# Patient Record
Sex: MALE | Race: WHITE | NOT HISPANIC OR LATINO | Employment: OTHER | URBAN - METROPOLITAN AREA
[De-identification: names, ages, dates, MRNs, and addresses within clinical notes are randomized per-mention and may not be internally consistent; named-entity substitution may affect disease eponyms.]

---

## 2018-01-23 ENCOUNTER — CONVERSION ENCOUNTER (OUTPATIENT)
Dept: FAMILY MEDICINE CLINIC | Facility: CLINIC | Age: 63
End: 2018-01-23

## 2018-01-23 ENCOUNTER — OFFICE VISIT CONVERTED (OUTPATIENT)
Dept: FAMILY MEDICINE CLINIC | Facility: CLINIC | Age: 63
End: 2018-01-23
Attending: FAMILY MEDICINE

## 2018-07-26 ENCOUNTER — CONVERSION ENCOUNTER (OUTPATIENT)
Dept: FAMILY MEDICINE CLINIC | Facility: CLINIC | Age: 63
End: 2018-07-26

## 2018-07-26 ENCOUNTER — OFFICE VISIT CONVERTED (OUTPATIENT)
Dept: FAMILY MEDICINE CLINIC | Facility: CLINIC | Age: 63
End: 2018-07-26
Attending: FAMILY MEDICINE

## 2018-08-16 ENCOUNTER — CONVERSION ENCOUNTER (OUTPATIENT)
Dept: FAMILY MEDICINE CLINIC | Facility: CLINIC | Age: 63
End: 2018-08-16

## 2018-09-14 ENCOUNTER — OFFICE VISIT CONVERTED (OUTPATIENT)
Dept: FAMILY MEDICINE CLINIC | Facility: CLINIC | Age: 63
End: 2018-09-14
Attending: FAMILY MEDICINE

## 2018-09-14 ENCOUNTER — CONVERSION ENCOUNTER (OUTPATIENT)
Dept: FAMILY MEDICINE CLINIC | Facility: CLINIC | Age: 63
End: 2018-09-14

## 2018-09-28 ENCOUNTER — CONVERSION ENCOUNTER (OUTPATIENT)
Dept: FAMILY MEDICINE CLINIC | Facility: CLINIC | Age: 63
End: 2018-09-28

## 2018-09-28 ENCOUNTER — OFFICE VISIT CONVERTED (OUTPATIENT)
Dept: FAMILY MEDICINE CLINIC | Facility: CLINIC | Age: 63
End: 2018-09-28
Attending: FAMILY MEDICINE

## 2019-01-24 ENCOUNTER — HOSPITAL ENCOUNTER (OUTPATIENT)
Dept: LAB | Facility: HOSPITAL | Age: 64
Discharge: HOME | End: 2019-01-24
Attending: FAMILY MEDICINE

## 2019-01-24 LAB
ALBUMIN SERPL-MCNC: 4 G/DL (ref 3.5–5)
ALBUMIN/GLOB SERPL: 1.5 {RATIO} (ref 1.4–2.6)
ALP SERPL-CCNC: 102 U/L (ref 56–155)
ALT SERPL-CCNC: 17 U/L (ref 10–40)
ANION GAP SERPL CALC-SCNC: 22 MMOL/L (ref 8–19)
AST SERPL-CCNC: 14 U/L (ref 15–50)
BASOPHILS # BLD AUTO: 0.07 10*3/UL (ref 0–0.2)
BASOPHILS NFR BLD AUTO: 0.75 % (ref 0–3)
BILIRUB SERPL-MCNC: 0.43 MG/DL (ref 0.2–1.3)
BUN SERPL-MCNC: 11 MG/DL (ref 5–25)
BUN/CREAT SERPL: 10 {RATIO} (ref 6–20)
CALCIUM SERPL-MCNC: 9.4 MG/DL (ref 8.7–10.4)
CHLORIDE SERPL-SCNC: 104 MMOL/L (ref 99–111)
CHOLEST SERPL-MCNC: 168 MG/DL (ref 107–200)
CHOLEST/HDLC SERPL: 3.8 {RATIO} (ref 3–6)
CONV CO2: 23 MMOL/L (ref 22–32)
CONV CREATININE URINE, RANDOM: 346.4 MG/DL (ref 10–300)
CONV MICROALBUM.,U,RANDOM: <12 MG/L (ref 0–20)
CONV TOTAL PROTEIN: 6.6 G/DL (ref 6.3–8.2)
CREAT UR-MCNC: 1.13 MG/DL (ref 0.7–1.2)
EOSINOPHIL # BLD AUTO: 0.12 10*3/UL (ref 0–0.7)
EOSINOPHIL # BLD AUTO: 1.28 % (ref 0–7)
ERYTHROCYTE [DISTWIDTH] IN BLOOD BY AUTOMATED COUNT: 11.5 % (ref 11.5–14.5)
GFR SERPLBLD BASED ON 1.73 SQ M-ARVRAT: >60 ML/MIN/{1.73_M2}
GLOBULIN UR ELPH-MCNC: 2.6 G/DL (ref 2–3.5)
GLUCOSE SERPL-MCNC: 86 MG/DL (ref 70–99)
HBA1C MFR BLD: 14.9 G/DL (ref 14–18)
HCT VFR BLD AUTO: 45.9 % (ref 42–52)
HDLC SERPL-MCNC: 44 MG/DL (ref 40–60)
LDLC SERPL CALC-MCNC: 95 MG/DL (ref 70–100)
LYMPHOCYTES # BLD AUTO: 2.96 10*3/UL (ref 1–5)
MCH RBC QN AUTO: 31.1 PG (ref 27–31)
MCHC RBC AUTO-ENTMCNC: 32.5 G/DL (ref 33–37)
MCV RBC AUTO: 95.6 FL (ref 80–96)
MICROALBUMIN/CREAT UR: 3.5 MG/G{CRE} (ref 0–25)
MONOCYTES # BLD AUTO: 0.63 10*3/UL (ref 0.2–1.2)
MONOCYTES NFR BLD AUTO: 6.65 % (ref 3–10)
NEUTROPHILS # BLD AUTO: 5.74 10*3/UL (ref 2–8)
NEUTROPHILS NFR BLD AUTO: 60.3 % (ref 30–85)
NRBC BLD AUTO-RTO: 0 % (ref 0–0.01)
OSMOLALITY SERPL CALC.SUM OF ELEC: 299 MOSM/KG (ref 273–304)
PLATELET # BLD AUTO: 180 10*3/UL (ref 130–400)
PMV BLD AUTO: 9.1 FL (ref 7.4–10.4)
POTASSIUM SERPL-SCNC: 3.7 MMOL/L (ref 3.5–5.3)
PSA SERPL-MCNC: 0.99 NG/ML (ref 0–4)
RBC # BLD AUTO: 4.81 10*6/UL (ref 4.7–6.1)
SODIUM SERPL-SCNC: 145 MMOL/L (ref 135–147)
TRIGL SERPL-MCNC: 147 MG/DL (ref 40–150)
TSH SERPL-ACNC: 2.3 M[IU]/L (ref 0.27–4.2)
VARIANT LYMPHS NFR BLD MANUAL: 31.1 % (ref 20–45)
VLDLC SERPL-MCNC: 29 MG/DL (ref 5–37)
WBC # BLD AUTO: 9.53 10*3/UL (ref 4.8–10.8)

## 2019-01-28 ENCOUNTER — CONVERSION ENCOUNTER (OUTPATIENT)
Dept: FAMILY MEDICINE CLINIC | Facility: CLINIC | Age: 64
End: 2019-01-28

## 2019-01-28 ENCOUNTER — OFFICE VISIT CONVERTED (OUTPATIENT)
Dept: FAMILY MEDICINE CLINIC | Facility: CLINIC | Age: 64
End: 2019-01-28
Attending: FAMILY MEDICINE

## 2019-08-08 ENCOUNTER — HOSPITAL ENCOUNTER (OUTPATIENT)
Dept: LAB | Facility: HOSPITAL | Age: 64
Discharge: HOME | End: 2019-08-08
Attending: FAMILY MEDICINE

## 2019-08-08 ENCOUNTER — CONVERSION ENCOUNTER (OUTPATIENT)
Dept: FAMILY MEDICINE CLINIC | Facility: CLINIC | Age: 64
End: 2019-08-08

## 2019-08-08 ENCOUNTER — OFFICE VISIT CONVERTED (OUTPATIENT)
Dept: FAMILY MEDICINE CLINIC | Facility: CLINIC | Age: 64
End: 2019-08-08
Attending: FAMILY MEDICINE

## 2019-08-08 LAB
BASOPHILS # BLD AUTO: 0.04 10*3/UL (ref 0–0.2)
BASOPHILS NFR BLD AUTO: 0.4 % (ref 0–3)
CONV ABS IMM GRAN: 0.04 10*3/UL (ref 0–0.2)
CONV IMMATURE GRAN: 0.4 % (ref 0–1.8)
DEPRECATED RDW RBC AUTO: 43 FL (ref 35.1–43.9)
EOSINOPHIL # BLD AUTO: 0.13 10*3/UL (ref 0–0.7)
EOSINOPHIL # BLD AUTO: 1.4 % (ref 0–7)
ERYTHROCYTE [DISTWIDTH] IN BLOOD BY AUTOMATED COUNT: 12.4 % (ref 11.6–14.4)
HCT VFR BLD AUTO: 43.9 % (ref 42–52)
HGB BLD-MCNC: 14.1 G/DL (ref 14–18)
LYMPHOCYTES # BLD AUTO: 1.89 10*3/UL (ref 1–5)
LYMPHOCYTES NFR BLD AUTO: 20.2 % (ref 20–45)
MCH RBC QN AUTO: 30.4 PG (ref 27–31)
MCHC RBC AUTO-ENTMCNC: 32.1 G/DL (ref 33–37)
MCV RBC AUTO: 94.6 FL (ref 80–96)
MONOCYTES # BLD AUTO: 0.86 10*3/UL (ref 0.2–1.2)
MONOCYTES NFR BLD AUTO: 9.2 % (ref 3–10)
NEUTROPHILS # BLD AUTO: 6.41 10*3/UL (ref 2–8)
NEUTROPHILS NFR BLD AUTO: 68.4 % (ref 30–85)
NRBC CBCN: 0 % (ref 0–0.7)
PLATELET # BLD AUTO: 172 10*3/UL (ref 130–400)
PMV BLD AUTO: 12.3 FL (ref 9.4–12.4)
RBC # BLD AUTO: 4.64 10*6/UL (ref 4.7–6.1)
WBC # BLD AUTO: 9.37 10*3/UL (ref 4.8–10.8)

## 2019-08-09 LAB
ALBUMIN SERPL-MCNC: 4.3 G/DL (ref 3.5–5)
ALBUMIN/GLOB SERPL: 1.7 {RATIO} (ref 1.4–2.6)
ALP SERPL-CCNC: 123 U/L (ref 56–155)
ALT SERPL-CCNC: 14 U/L (ref 10–40)
ANION GAP SERPL CALC-SCNC: 14 MMOL/L (ref 8–19)
AST SERPL-CCNC: 19 U/L (ref 15–50)
BILIRUB SERPL-MCNC: 0.54 MG/DL (ref 0.2–1.3)
BUN SERPL-MCNC: 11 MG/DL (ref 5–25)
BUN/CREAT SERPL: 11 {RATIO} (ref 6–20)
CALCIUM SERPL-MCNC: 9.1 MG/DL (ref 8.7–10.4)
CHLORIDE SERPL-SCNC: 103 MMOL/L (ref 99–111)
CHOLEST SERPL-MCNC: 168 MG/DL (ref 107–200)
CHOLEST/HDLC SERPL: 4.1 {RATIO} (ref 3–6)
CONV CO2: 25 MMOL/L (ref 22–32)
CONV CREATININE URINE, RANDOM: 185.9 MG/DL (ref 10–300)
CONV MICROALBUM.,U,RANDOM: <12 MG/L (ref 0–20)
CONV TOTAL PROTEIN: 6.9 G/DL (ref 6.3–8.2)
CREAT UR-MCNC: 1.01 MG/DL (ref 0.7–1.2)
GFR SERPLBLD BASED ON 1.73 SQ M-ARVRAT: >60 ML/MIN/{1.73_M2}
GLOBULIN UR ELPH-MCNC: 2.6 G/DL (ref 2–3.5)
GLUCOSE SERPL-MCNC: 78 MG/DL (ref 70–99)
HDLC SERPL-MCNC: 41 MG/DL (ref 40–60)
LDLC SERPL CALC-MCNC: 106 MG/DL (ref 70–100)
MICROALBUMIN/CREAT UR: 6.5 MG/G{CRE} (ref 0–25)
OSMOLALITY SERPL CALC.SUM OF ELEC: 284 MOSM/KG (ref 273–304)
POTASSIUM SERPL-SCNC: 4.4 MMOL/L (ref 3.5–5.3)
SODIUM SERPL-SCNC: 138 MMOL/L (ref 135–147)
TRIGL SERPL-MCNC: 104 MG/DL (ref 40–150)
TSH SERPL-ACNC: 1.57 M[IU]/L (ref 0.27–4.2)
VLDLC SERPL-MCNC: 21 MG/DL (ref 5–37)

## 2019-10-07 ENCOUNTER — CONVERSION ENCOUNTER (OUTPATIENT)
Dept: GASTROENTEROLOGY | Facility: CLINIC | Age: 64
End: 2019-10-07
Attending: INTERNAL MEDICINE

## 2019-10-29 ENCOUNTER — HOSPITAL ENCOUNTER (OUTPATIENT)
Dept: GASTROENTEROLOGY | Facility: HOSPITAL | Age: 64
Setting detail: HOSPITAL OUTPATIENT SURGERY
Discharge: HOME | End: 2019-10-29
Attending: INTERNAL MEDICINE

## 2020-02-07 ENCOUNTER — HOSPITAL ENCOUNTER (OUTPATIENT)
Dept: LAB | Facility: HOSPITAL | Age: 65
Discharge: HOME | End: 2020-02-07
Attending: FAMILY MEDICINE

## 2020-02-07 LAB
ALBUMIN SERPL-MCNC: 4.1 G/DL (ref 3.5–5)
ALBUMIN/GLOB SERPL: 1.6 {RATIO} (ref 1.4–2.6)
ALP SERPL-CCNC: 123 U/L (ref 56–155)
ALT SERPL-CCNC: 23 U/L (ref 10–40)
ANION GAP SERPL CALC-SCNC: 20 MMOL/L (ref 8–19)
AST SERPL-CCNC: 23 U/L (ref 15–50)
BASOPHILS # BLD AUTO: 0.03 10*3/UL (ref 0–0.2)
BASOPHILS NFR BLD AUTO: 0.4 % (ref 0–3)
BILIRUB SERPL-MCNC: 0.28 MG/DL (ref 0.2–1.3)
BUN SERPL-MCNC: 11 MG/DL (ref 5–25)
BUN/CREAT SERPL: 11 {RATIO} (ref 6–20)
CALCIUM SERPL-MCNC: 8.9 MG/DL (ref 8.7–10.4)
CHLORIDE SERPL-SCNC: 108 MMOL/L (ref 99–111)
CHOLEST SERPL-MCNC: 194 MG/DL (ref 107–200)
CHOLEST/HDLC SERPL: 4.9 {RATIO} (ref 3–6)
CONV ABS IMM GRAN: 0.04 10*3/UL (ref 0–0.2)
CONV CO2: 20 MMOL/L (ref 22–32)
CONV CREATININE URINE, RANDOM: 147.9 MG/DL (ref 10–300)
CONV IMMATURE GRAN: 0.5 % (ref 0–1.8)
CONV MICROALBUM.,U,RANDOM: <12 MG/L (ref 0–20)
CONV TOTAL PROTEIN: 6.6 G/DL (ref 6.3–8.2)
CREAT UR-MCNC: 1.02 MG/DL (ref 0.7–1.2)
DEPRECATED RDW RBC AUTO: 42.4 FL (ref 35.1–43.9)
EOSINOPHIL # BLD AUTO: 0.31 10*3/UL (ref 0–0.7)
EOSINOPHIL # BLD AUTO: 3.9 % (ref 0–7)
ERYTHROCYTE [DISTWIDTH] IN BLOOD BY AUTOMATED COUNT: 12.2 % (ref 11.6–14.4)
GFR SERPLBLD BASED ON 1.73 SQ M-ARVRAT: >60 ML/MIN/{1.73_M2}
GLOBULIN UR ELPH-MCNC: 2.5 G/DL (ref 2–3.5)
GLUCOSE SERPL-MCNC: 97 MG/DL (ref 70–99)
HCT VFR BLD AUTO: 46 % (ref 42–52)
HDLC SERPL-MCNC: 40 MG/DL (ref 40–60)
HGB BLD-MCNC: 14.9 G/DL (ref 14–18)
LDLC SERPL CALC-MCNC: 130 MG/DL (ref 70–100)
LYMPHOCYTES # BLD AUTO: 1.83 10*3/UL (ref 1–5)
LYMPHOCYTES NFR BLD AUTO: 23.2 % (ref 20–45)
MCH RBC QN AUTO: 30.7 PG (ref 27–31)
MCHC RBC AUTO-ENTMCNC: 32.4 G/DL (ref 33–37)
MCV RBC AUTO: 94.8 FL (ref 80–96)
MICROALBUMIN/CREAT UR: 8.1 MG/G{CRE} (ref 0–25)
MONOCYTES # BLD AUTO: 0.74 10*3/UL (ref 0.2–1.2)
MONOCYTES NFR BLD AUTO: 9.4 % (ref 3–10)
NEUTROPHILS # BLD AUTO: 4.95 10*3/UL (ref 2–8)
NEUTROPHILS NFR BLD AUTO: 62.6 % (ref 30–85)
NRBC CBCN: 0 % (ref 0–0.7)
OSMOLALITY SERPL CALC.SUM OF ELEC: 297 MOSM/KG (ref 273–304)
PLATELET # BLD AUTO: 180 10*3/UL (ref 130–400)
PMV BLD AUTO: 11.5 FL (ref 9.4–12.4)
POTASSIUM SERPL-SCNC: 3.9 MMOL/L (ref 3.5–5.3)
PSA SERPL-MCNC: 0.86 NG/ML (ref 0–4)
RBC # BLD AUTO: 4.85 10*6/UL (ref 4.7–6.1)
SODIUM SERPL-SCNC: 144 MMOL/L (ref 135–147)
TRIGL SERPL-MCNC: 122 MG/DL (ref 40–150)
TSH SERPL-ACNC: 2.68 M[IU]/L (ref 0.27–4.2)
VLDLC SERPL-MCNC: 24 MG/DL (ref 5–37)
WBC # BLD AUTO: 7.9 10*3/UL (ref 4.8–10.8)

## 2020-02-10 ENCOUNTER — OFFICE VISIT CONVERTED (OUTPATIENT)
Dept: FAMILY MEDICINE CLINIC | Facility: CLINIC | Age: 65
End: 2020-02-10
Attending: FAMILY MEDICINE

## 2020-03-26 ENCOUNTER — HOSPITAL ENCOUNTER (OUTPATIENT)
Dept: GENERAL RADIOLOGY | Facility: HOSPITAL | Age: 65
Discharge: HOME | End: 2020-03-26
Attending: FAMILY MEDICINE

## 2020-08-10 ENCOUNTER — HOSPITAL ENCOUNTER (OUTPATIENT)
Dept: LAB | Facility: HOSPITAL | Age: 65
Discharge: HOME | End: 2020-08-10
Attending: FAMILY MEDICINE

## 2020-08-10 LAB
ALBUMIN SERPL-MCNC: 4.4 G/DL (ref 3.5–5)
ALBUMIN/GLOB SERPL: 1.8 {RATIO} (ref 1.4–2.6)
ALP SERPL-CCNC: 118 U/L (ref 56–155)
ALT SERPL-CCNC: 29 U/L (ref 10–40)
ANION GAP SERPL CALC-SCNC: 19 MMOL/L (ref 8–19)
AST SERPL-CCNC: 27 U/L (ref 15–50)
BASOPHILS # BLD AUTO: 0.03 10*3/UL (ref 0–0.2)
BASOPHILS NFR BLD AUTO: 0.4 % (ref 0–3)
BILIRUB SERPL-MCNC: 0.67 MG/DL (ref 0.2–1.3)
BUN SERPL-MCNC: 10 MG/DL (ref 5–25)
BUN/CREAT SERPL: 8 {RATIO} (ref 6–20)
CALCIUM SERPL-MCNC: 9.5 MG/DL (ref 8.7–10.4)
CHLORIDE SERPL-SCNC: 105 MMOL/L (ref 99–111)
CHOLEST SERPL-MCNC: 178 MG/DL (ref 107–200)
CHOLEST/HDLC SERPL: 3.7 {RATIO} (ref 3–6)
CONV ABS IMM GRAN: 0.04 10*3/UL (ref 0–0.2)
CONV CO2: 22 MMOL/L (ref 22–32)
CONV IMMATURE GRAN: 0.5 % (ref 0–1.8)
CONV TOTAL PROTEIN: 6.9 G/DL (ref 6.3–8.2)
CREAT UR-MCNC: 1.28 MG/DL (ref 0.7–1.2)
DEPRECATED RDW RBC AUTO: 44.7 FL (ref 35.1–43.9)
EOSINOPHIL # BLD AUTO: 0.28 10*3/UL (ref 0–0.7)
EOSINOPHIL # BLD AUTO: 3.7 % (ref 0–7)
ERYTHROCYTE [DISTWIDTH] IN BLOOD BY AUTOMATED COUNT: 12.7 % (ref 11.6–14.4)
GFR SERPLBLD BASED ON 1.73 SQ M-ARVRAT: 59 ML/MIN/{1.73_M2}
GLOBULIN UR ELPH-MCNC: 2.5 G/DL (ref 2–3.5)
GLUCOSE SERPL-MCNC: 105 MG/DL (ref 70–99)
HCT VFR BLD AUTO: 47.6 % (ref 42–52)
HDLC SERPL-MCNC: 48 MG/DL (ref 40–60)
HGB BLD-MCNC: 15.5 G/DL (ref 14–18)
LDLC SERPL CALC-MCNC: 112 MG/DL (ref 70–100)
LYMPHOCYTES # BLD AUTO: 1.51 10*3/UL (ref 1–5)
LYMPHOCYTES NFR BLD AUTO: 19.8 % (ref 20–45)
MCH RBC QN AUTO: 31.3 PG (ref 27–31)
MCHC RBC AUTO-ENTMCNC: 32.6 G/DL (ref 33–37)
MCV RBC AUTO: 96.2 FL (ref 80–96)
MONOCYTES # BLD AUTO: 0.65 10*3/UL (ref 0.2–1.2)
MONOCYTES NFR BLD AUTO: 8.5 % (ref 3–10)
NEUTROPHILS # BLD AUTO: 5.13 10*3/UL (ref 2–8)
NEUTROPHILS NFR BLD AUTO: 67.1 % (ref 30–85)
NRBC CBCN: 0 % (ref 0–0.7)
OSMOLALITY SERPL CALC.SUM OF ELEC: 293 MOSM/KG (ref 273–304)
PLATELET # BLD AUTO: 173 10*3/UL (ref 130–400)
PMV BLD AUTO: 11.6 FL (ref 9.4–12.4)
POTASSIUM SERPL-SCNC: 4.2 MMOL/L (ref 3.5–5.3)
RBC # BLD AUTO: 4.95 10*6/UL (ref 4.7–6.1)
SODIUM SERPL-SCNC: 142 MMOL/L (ref 135–147)
TRIGL SERPL-MCNC: 88 MG/DL (ref 40–150)
TSH SERPL-ACNC: 1.77 M[IU]/L (ref 0.27–4.2)
VLDLC SERPL-MCNC: 18 MG/DL (ref 5–37)
WBC # BLD AUTO: 7.64 10*3/UL (ref 4.8–10.8)

## 2020-08-13 ENCOUNTER — CONVERSION ENCOUNTER (OUTPATIENT)
Dept: FAMILY MEDICINE CLINIC | Facility: CLINIC | Age: 65
End: 2020-08-13

## 2020-08-13 ENCOUNTER — OFFICE VISIT CONVERTED (OUTPATIENT)
Dept: FAMILY MEDICINE CLINIC | Facility: CLINIC | Age: 65
End: 2020-08-13
Attending: FAMILY MEDICINE

## 2020-08-13 LAB
CONV CREATININE URINE, RANDOM: 545.1 MG/DL (ref 10–300)
CONV MICROALBUM.,U,RANDOM: 27.1 MG/L (ref 0–20)
MICROALBUMIN/CREAT UR: 5 MG/G{CRE} (ref 0–25)

## 2020-10-22 ENCOUNTER — HOSPITAL ENCOUNTER (OUTPATIENT)
Dept: GENERAL RADIOLOGY | Facility: HOSPITAL | Age: 65
Discharge: HOME | End: 2020-10-22
Attending: INTERNAL MEDICINE

## 2020-10-27 ENCOUNTER — HOSPITAL ENCOUNTER (OUTPATIENT)
Dept: GENERAL RADIOLOGY | Facility: HOSPITAL | Age: 65
Discharge: HOME | End: 2020-10-27
Attending: INTERNAL MEDICINE

## 2021-02-12 ENCOUNTER — HOSPITAL ENCOUNTER (OUTPATIENT)
Dept: LAB | Facility: HOSPITAL | Age: 66
Discharge: HOME | End: 2021-02-12
Attending: FAMILY MEDICINE

## 2021-02-12 LAB
ALBUMIN SERPL-MCNC: 4.3 G/DL (ref 3.5–5)
ALBUMIN/GLOB SERPL: 1.7 {RATIO} (ref 1.4–2.6)
ALP SERPL-CCNC: 118 U/L (ref 56–155)
ALT SERPL-CCNC: 24 U/L (ref 10–40)
ANION GAP SERPL CALC-SCNC: 19 MMOL/L (ref 8–19)
AST SERPL-CCNC: 23 U/L (ref 15–50)
BASOPHILS # BLD AUTO: 0.05 10*3/UL (ref 0–0.2)
BASOPHILS NFR BLD AUTO: 0.6 % (ref 0–3)
BILIRUB SERPL-MCNC: 0.52 MG/DL (ref 0.2–1.3)
BUN SERPL-MCNC: 13 MG/DL (ref 5–25)
BUN/CREAT SERPL: 10 {RATIO} (ref 6–20)
CALCIUM SERPL-MCNC: 9 MG/DL (ref 8.7–10.4)
CHLORIDE SERPL-SCNC: 104 MMOL/L (ref 99–111)
CHOLEST SERPL-MCNC: 184 MG/DL (ref 107–200)
CHOLEST/HDLC SERPL: 4.6 {RATIO} (ref 3–6)
CONV ABS IMM GRAN: 0.04 10*3/UL (ref 0–0.2)
CONV CO2: 24 MMOL/L (ref 22–32)
CONV IMMATURE GRAN: 0.5 % (ref 0–1.8)
CONV TOTAL PROTEIN: 6.9 G/DL (ref 6.3–8.2)
CREAT UR-MCNC: 1.31 MG/DL (ref 0.7–1.2)
DEPRECATED RDW RBC AUTO: 42.2 FL (ref 35.1–43.9)
EOSINOPHIL # BLD AUTO: 0.25 10*3/UL (ref 0–0.7)
EOSINOPHIL # BLD AUTO: 2.9 % (ref 0–7)
ERYTHROCYTE [DISTWIDTH] IN BLOOD BY AUTOMATED COUNT: 12.2 % (ref 11.6–14.4)
EST. AVERAGE GLUCOSE BLD GHB EST-MCNC: 117 MG/DL
GFR SERPLBLD BASED ON 1.73 SQ M-ARVRAT: 57 ML/MIN/{1.73_M2}
GLOBULIN UR ELPH-MCNC: 2.6 G/DL (ref 2–3.5)
GLUCOSE SERPL-MCNC: 93 MG/DL (ref 70–99)
HBA1C MFR BLD: 5.7 % (ref 3.5–5.7)
HCT VFR BLD AUTO: 47.2 % (ref 42–52)
HDLC SERPL-MCNC: 40 MG/DL (ref 40–60)
HGB BLD-MCNC: 15.4 G/DL (ref 14–18)
LDLC SERPL CALC-MCNC: 112 MG/DL (ref 70–100)
LYMPHOCYTES # BLD AUTO: 1.84 10*3/UL (ref 1–5)
LYMPHOCYTES NFR BLD AUTO: 21.3 % (ref 20–45)
MCH RBC QN AUTO: 30.6 PG (ref 27–31)
MCHC RBC AUTO-ENTMCNC: 32.6 G/DL (ref 33–37)
MCV RBC AUTO: 93.7 FL (ref 80–96)
MONOCYTES # BLD AUTO: 0.8 10*3/UL (ref 0.2–1.2)
MONOCYTES NFR BLD AUTO: 9.3 % (ref 3–10)
NEUTROPHILS # BLD AUTO: 5.65 10*3/UL (ref 2–8)
NEUTROPHILS NFR BLD AUTO: 65.4 % (ref 30–85)
NRBC CBCN: 0 % (ref 0–0.7)
OSMOLALITY SERPL CALC.SUM OF ELEC: 296 MOSM/KG (ref 273–304)
PLATELET # BLD AUTO: 210 10*3/UL (ref 130–400)
PMV BLD AUTO: 11.3 FL (ref 9.4–12.4)
POTASSIUM SERPL-SCNC: 4 MMOL/L (ref 3.5–5.3)
PSA SERPL-MCNC: 0.97 NG/ML (ref 0–4)
RBC # BLD AUTO: 5.04 10*6/UL (ref 4.7–6.1)
SODIUM SERPL-SCNC: 143 MMOL/L (ref 135–147)
TRIGL SERPL-MCNC: 160 MG/DL (ref 40–150)
TSH SERPL-ACNC: 2.98 M[IU]/L (ref 0.27–4.2)
VLDLC SERPL-MCNC: 32 MG/DL (ref 5–37)
WBC # BLD AUTO: 8.63 10*3/UL (ref 4.8–10.8)

## 2021-02-15 ENCOUNTER — HOSPITAL ENCOUNTER (OUTPATIENT)
Dept: FAMILY MEDICINE CLINIC | Facility: CLINIC | Age: 66
Discharge: HOME | End: 2021-02-15
Attending: FAMILY MEDICINE

## 2021-02-15 ENCOUNTER — HOSPITAL ENCOUNTER (OUTPATIENT)
Dept: LAB | Facility: HOSPITAL | Age: 66
Discharge: HOME | End: 2021-02-15
Attending: FAMILY MEDICINE

## 2021-02-15 ENCOUNTER — OFFICE VISIT CONVERTED (OUTPATIENT)
Dept: FAMILY MEDICINE CLINIC | Facility: CLINIC | Age: 66
End: 2021-02-15
Attending: FAMILY MEDICINE

## 2021-02-16 LAB
CONV HIV-1/ HIV-2: NONREACTIVE
HSV I/II IGM: <0.91 RATIO (ref 0–0.9)
HSV1 IGG SER IA-ACNC: >62.2 INDEX (ref 0–0.9)
HSV2 IGG SER IA-ACNC: <0.91 INDEX (ref 0–0.9)
RPR SER QL: ABNORMAL

## 2021-02-17 LAB
C TRACH RRNA CVX QL NAA+PROBE: NEGATIVE
N GONORRHOEA DNA SPEC QL NAA+PROBE: NEGATIVE

## 2021-05-13 NOTE — PROGRESS NOTES
Progress Note      Patient Name: Jonathan Guerra Jr.   Patient ID: [id number]   Sex: Male   YOB: 1955    Primary Care Provider: Jin Green MD   Referring Provider: Jin Green MD    Visit Date: August 13, 2020    Provider: Jin Green MD   Location: [facility]   Location Address: [address] [place] [address], [address] [place] [address]  [id number]   Location Phone: [phone]          Chief Complaint  · CPE     Patient is here for a CPE today.  He does not need any refills today.    No other complaints.       History Of Present Illness  Jonathan Guerra Jr. is a 64 year old /White male who presents for evaluation and treatment of:      CPX       Past Medical History  Disease Name Date Onset Notes   Anemia, Unspecified --  --    Anxiety --  --    Arthritis --  --    Asthma --  --    Broken Bones --  --    Chronic bronchitis --  --    Chronic Obstructive Pulmonary Disease --  --    Closed nondisplaced fracture of left calcaneus with routine healing, unspecified portion of calcaneus, subsequent encounter 08/29/2017 --    Depression --  --    Diverticulitis --  20 yrs ago per patient.   Emphysema --  --    Head injury --  four skull fracture. 2005. Dr. Sosa was neurosurgeon at the time.   Hernia --  --    High blood pressure --  --    Hyperlipemia --  --    Hypertension --  --    Lung disease --  --    Reflux --  --    Reflux Disease --  --    Ringing in ears --  --    Seasonal allergies --  --    Shortness of Breath --  --    Sinus Trouble 2005 sinus crushed d/t injury          Past Surgical History  Procedure Name Date Notes   Colonoscopy 2014 2019 around 1988, Keith   EGD 2013 --          Medication List  Name Date Started Instructions   cetirizine 10 mg oral tablet 06/04/2020 Take 1 tablet (10 mg) by oral route once a day for 30 days   Combivent Respimat  mcg/actuation inhalation mist 08/08/2019 inhale 1 puff by inhalation route 4 times per day ; may take additional  puffs as needed not to exceed 6 puffs in 24hrs for 30 days   Fish Oil 1,000 mg (120 mg-180 mg) oral capsule  take 1 capsule by oral route daily   hydrochlorothiazide 12.5 mg oral tablet 08/16/2019 take 1 tablet (12.5 mg) by oral route once daily for 30 days   lisinopril 20 mg oral tablet 02/10/2020 take 1 tablet by oral route 2 times a day for 30 days   meloxicam 7.5 mg oral tablet 09/19/2019 take 1 tablet by oral route 2 times a day for 30 days   metoprolol succinate 25 mg oral tablet extended release 24 hr 02/10/2020 take 2 tablets (50 mg) by oral route QAM, and 1 tablet po QHS   Protonix 40 mg oral tablet,delayed release (DR/EC) 11/14/2019 take 1 tablet (40 mg) by oral route once daily for 90 days         Allergy List  Allergen Name Date Reaction Notes   Codeine Phosphate --  --  --    Codeine Sulfate --  --  --    PENICILLINS --  --  --        Allergies Reconciled  Family Medical History  Disease Name Relative/Age Notes   Thyroid Gland Neoplasm, Malignant Mother/   --    Heart Disease Mother/   Mother  Mother  hx of MI. age 47.   Diabetes, unspecified type Sister/   Sister  Sister  grandparents   Colon Cancer Grandmother (paternal)/86  Mother/72   mom current age 75 grandmother passed at age 87         Social History  Finding Status Start/Stop Quantity Notes   Active but no formal exercise --  --/-- --  --    Alcohol Current - status unknown --/-- --  08/22/2017 - socially   Alcohol Use Current some day --/-- --  occasionally drinks, less than 1 drink per day, has been drinking for 21-30 years  occasionally drinks, 2 drinks per day, has been drinking for 21-30 years   Disabled --  --/-- --  COPD and asthma   lives alone --  --/-- --  --     --  --/-- --  lives with wife   Recreational Drug Use Never --/-- --  no   Sedentary --  --/-- --  --    Single. --  --/-- --  --    Tobacco Former --/-- 1.5 PPD 08/22/2017 - former smoker, 1.5 packs per day, smoked 6-10 years  former smoker, 1.5 packs per day,  "smoked 6-10 years  stop in 2007. Smoked for 40 yrs.         Immunizations  NameDate Admin Mfg Trade Name Lot Number Route Inj VIS Given VIS Publication   InfluenzaRefused 02/10/2020 NE Not Entered  NE NE     Comments:    Cqbzyyyzz46/28/2018 SKB Fluarix, quadrivalent, preservative free [id number] IM LD 09/28/2018 08/07/2015   Comments:    Npeqimcwk99/02/2017 SKB Fluarix, quadrivalent, preservative free [id number] IM LD 11/02/2017 07/02/2012   Comments:    Dlfwzyjqw83/22/2012 SKB Fluarix-PF > 18 Years [id number] IM RD 10/22/2012    Comments:    Tdap08/08/2019 SKB BOOSTRIX [id number] IM LA 08/08/2019    Comments: Pt. left office in stable condition   Tdap08/08/2019 SKB BOOSTRIX [id number] IM LA 08/08/2019    Comments: Pt. left office in stable condition   Tdap08/08/2019 SKB BOOSTRIX [id number] IM LA 08/08/2019    Comments: Pt. left office in stable condition         Review of Systems  · Constitutional  o Denies  o : fever, weight loss, weight gain  · Cardiovascular  o Denies  o : lower extremity edema, claudication, chest pressure, palpitations  · Respiratory  o Denies  o : shortness of breath, wheezing, cough, hemoptysis, dyspnea on exertion  · Gastrointestinal  o Denies  o : nausea, vomiting, diarrhea, constipation, abdominal pain      Vitals  Date Time BP Position Site L\R Cuff Size HR RR TEMP (F) WT  HT  BMI kg/m2 BSA m2 O2 Sat        08/13/2020 08:50 /74 Sitting    79 - R   167lbs 8oz 5'  2\" 30.64 1.82 98 %          Physical Examination  · Constitutional  o Appearance  o : alert, in no acute distress, well developed, well-nourished  · Head and Face  o Head  o : normocephalic, atraumatic, non tender, no palpable masses or nodules.  o Face  o : no facial lesions  · Eyes  o Vision  o : Acuity: grossly normal at distance, Conjuntivae: Normal, Sclerae white  · Respiratory  o Auscultation of Lungs  o : normal breath sounds throughout  · Cardiovascular  o Heart  o : Regular rate and rhythm, Normal S1,S2 "   · Gastrointestinal  o Abdominal Examination  o : abdomen soft, nontender, non distended, no rigidity, gaurding, rebound tenderness, no ventral or inguinal hernias present  o Liver and spleen  o : no hepatomegaly present, liver nontender to palpation, spleen not palpable  · Psychiatric  o Mood and Affect  o : normal mood and affect          Assessment  · Elevated glucose     790.29/R73.09  · Screening for cardiovascular condition     V81.2/Z13.6  · Routine lab draw     V72.60/Z01.89  · HLD (hyperlipidemia)     272.4/E78.5  · HTN (hypertension)     401.9/I10  · Screening for prostate cancer     V76.44/Z12.5  · Skin exam, screening for cancer     V76.43/Z12.83  · Sun exposure, moderate     E926.2/X32.XXXA  · Annual physical exam     V70.0/Z00.00       f.u in 6 months  refer to derm for yearly exam.       Plan  · Orders  o Hgb A1c [facility] ([id number]) - 790.29/R73.09, V72.60/Z01.89 - 02/13/2021  o Male Physical Primary Care Panel (CMP, CBC, TSH, Lipid, PSA) [facility] (61862, 56407, 64590, 27211, 42653, ) - V81.2/Z13.6, 272.4/E78.5, 401.9/I10, V76.44/Z12.5 - 02/13/2021  o DERMATOLOGY CONSULTATION (DERMA) - V76.43/Z12.83, E926.2/X32.XXXA - 08/13/2020  o ACO-14: Influenza immunization was not administered for reasons documented () - - 08/13/2020  o ACO-39: Current medications updated and reviewed () - - 08/13/2020  · Medications  o Medications have been Reconciled  o Transition of Care or Provider Policy  · Instructions  o Electronically Identified Patient Education Materials Provided Electronically  · Disposition  o Call or Return if symptoms worsen or persist.  o Care Transition            Electronically Signed by: Jin Green MD -Author on August 13, 2020 12:32:59 PM

## 2021-05-14 VITALS
HEIGHT: 62 IN | BODY MASS INDEX: 31.87 KG/M2 | OXYGEN SATURATION: 96 % | TEMPERATURE: 98 F | HEART RATE: 68 BPM | WEIGHT: 173.19 LBS | SYSTOLIC BLOOD PRESSURE: 142 MMHG | DIASTOLIC BLOOD PRESSURE: 82 MMHG | RESPIRATION RATE: 18 BRPM

## 2021-05-14 NOTE — PROGRESS NOTES
Progress Note      Patient Name: Jonathan Guerra Jr.   Patient ID: [id number]   Sex: Male   YOB: 1955    Primary Care Provider: Jin Green MD   Referring Provider: Jin Green MD    Visit Date: February 15, 2021    Provider: Jin Green MD   Location: [facility]   Location Address: [address] [place] [address], [address]  [place], [place]  [id number]   Location Phone: [phone]          Chief Complaint     6 month follow up       History Of Present Illness  Jonathan Guerra Jr. is a 65 year old /White male who presents for evaluation and treatment of:      Pt  presents for follow-up    Hx of anemia.  corrected.      Hx of COPD. He sees Pulm on regular basis, Dr. Warner. controlled.      Hx of HTN. pt is on metoprolol and lisinopril, and hctz.. mildly uncontrolled.     Hx of heart palpitations.   Controlled with metoprolol.    Hx of allergic rhinitis. controlled.     Hx of arhthritis pains in the joints. controlled on meloxicam.    Hx of headaches.  Controlled.     Hx of GERD. Controlled.    The cholesterol is in good range overall  He gained 6 lbs last year.     He was told he may have genital herpes..by a specialist, and he is wanting to know what to do. He did have an affair earlier last year. Denies lesions/sores on the genital area..Just an itchy rash of both groin regions.    No chest pain, soa, nausea, vomiting, diarrhea, constipation, fever, or chills. No abodminal pain.       Past Medical History  Disease Name Date Onset Notes   Anemia, Unspecified --  --    Anxiety --  --    Arthritis --  --    Asthma --  --    Broken Bones --  --    Chronic bronchitis --  --    Chronic Obstructive Pulmonary Disease --  --    Closed nondisplaced fracture of left calcaneus with routine healing, unspecified portion of calcaneus, subsequent encounter 08/29/2017 --    Depression --  --    Diverticulitis --  20 yrs ago per patient.   Emphysema --  --    Head injury --  four  skull fracture. 2005. Dr. Sosa was neurosurgeon at the time.   Hernia --  --    High blood pressure --  --    Hyperlipemia --  --    Hypertension --  --    Lung disease --  --    Reflux --  --    Reflux Disease --  --    Ringing in ears --  --    Seasonal allergies --  --    Shortness of Breath --  --    Sinus Trouble 2005 sinus crushed d/t injury          Past Surgical History  Procedure Name Date Notes   Colonoscopy 2014 2019 around 1988, [facility]   EGD 2013 --          Medication List  Name Date Started Instructions   cetirizine 10 mg oral tablet 02/15/2021 Take 1 tablet (10 mg) by oral route once a day for 30 days   clotrimazole-betamethasone 1-0.05 % topical cream 02/15/2021 apply to the affected and surrounding areas of skin by topical route 2 times per day in the morning and evening for 4 weeks   Combivent Respimat  mcg/actuation inhalation mist 02/15/2021 inhale 1 puff by inhalation route 4 times per day ; may take additional puffs as needed not to exceed 6 puffs in 24hrs for 30 days   Fish Oil 1,000 mg (120 mg-180 mg) oral capsule  take 1 capsule by oral route daily   fluconazole 100 mg oral tablet 02/15/2021 take 1 tablet (100 mg) by oral route once daily for 10 days   hydrochlorothiazide 12.5 mg oral tablet 09/08/2020 take 1 tablet (12.5 mg) by oral route once daily for 30 days   lisinopril 20 mg oral tablet 02/15/2021 take 1 tablet by oral route 2 times a day for 30 days   meloxicam 7.5 mg oral tablet 09/19/2019 take 1 tablet by oral route 2 times a day for 30 days   metoprolol succinate 25 mg oral tablet extended release 24 hr 02/10/2020 take 2 tablets (50 mg) by oral route QAM, and 1 tablet po QHS   nystatin 100,000 unit/gram topical powder 02/15/2021 apply to the affected area(s) by topical route 2 times per day for 30 days   Protonix 40 mg oral tablet,delayed release (/EC) 11/14/2019 take 1 tablet (40 mg) by oral route once daily for 90 days         Allergy List  Allergen Name Date Reaction  Notes   Codeine Phosphate --  --  --    Codeine Sulfate --  --  --    PENICILLINS --  --  --        Allergies Reconciled  Family Medical History  Disease Name Relative/Age Notes   Thyroid Gland Neoplasm, Malignant Mother/   --    Heart Disease Mother/   Mother  Mother  hx of MI. age 47.   Diabetes, unspecified type Sister/   Sister  Sister  grandparents   Colon Cancer Grandmother (paternal)/86  Mother/72   mom current age 75 grandmother passed at age 87         Social History  Finding Status Start/Stop Quantity Notes   Active but no formal exercise --  --/-- --  --    Alcohol Current - status unknown --/-- --  08/22/2017 - socially   Alcohol Use Current some day --/-- --  occasionally drinks, less than 1 drink per day, has been drinking for 21-30 years  occasionally drinks, 2 drinks per day, has been drinking for 21-30 years   Disabled --  --/-- --  COPD and asthma   lives alone --  --/-- --  --     --  --/-- --  lives with wife   Recreational Drug Use Never --/-- --  no   Sedentary --  --/-- --  --    Single. --  --/-- --  --    Tobacco Former --/-- 1.5 PPD 08/22/2017 - former smoker, 1.5 packs per day, smoked 6-10 years  former smoker, 1.5 packs per day, smoked 6-10 years  stop in 2007. Smoked for 40 yrs.         Immunizations  NameDate Admin Mfg Trade Name Lot Number Route Inj VIS Given VIS Publication   Vpcjmxhfg34/01/2020 SKB Fluarix, quadrivalent, preservative free [id number] NE NE 02/15/2021    Comments: [place] office   Tdap08/08/2019 SKB BOOSTRIX [id number] IM LA 08/08/2019    Comments: Pt. left office in stable condition         Review of Systems  · Constitutional  o Denies  o : fever, weight loss, weight gain  · Cardiovascular  o Denies  o : pedal edema, claudication, chest pressure, palpitations  · Respiratory  o Denies  o : shortness of breath, wheezing, cough, hemoptysis, dyspnea on exertion  · Gastrointestinal  o Denies  o : nausea, vomiting, diarrhea, constipation, abdominal  "pain      Vitals  Date Time BP Position Site L\R Cuff Size HR RR TEMP (F) WT  HT  BMI kg/m2 BSA m2 O2 Sat FR L/min FiO2 HC       02/15/2021 07:48 /82 Sitting    68 - R 18 98 173lbs 3oz 5'  2\" 31.68 1.85 96 %  21%          Physical Examination  · Constitutional  o Appearance  o : alert, in no acute distress, well developed, well-nourished  · Head and Face  o Head  o : normocephalic, atraumatic, non tender, no palpable masses or nodules.  o Face  o : no facial lesions  · Eyes  o Vision  o : Acuity: grossly normal at distance, Conjuntivae: Normal, Sclerae white  · Respiratory  o Auscultation of Lungs  o : normal breath sounds throughout  · Cardiovascular  o Heart  o : Regular rate and rhythm, Normal S1,S2   · Skin and Subcutaneous Tissue  o General Inspection  o : mildly erythematous rash of the bilateral groin region  · Psychiatric  o Mood and Affect  o : normal mood and affect          Assessment  · Candidiasis     112.9/B37.9  · Elevated glucose     790.29/R73.09  · Groin rash     782.1/R21  · STD exposure     V01.6/Z20.2  · Screening for cardiovascular condition     V81.2/Z13.6  · HLD (hyperlipidemia)     272.4/E78.5  · HTN (hypertension)     401.9/I10       diflucan  clotrimazole betamethasone cream    go up on metoprolol from 2 in am and 1 in pm...to 2 in am and 2 in pm.     f/u as directed.       Plan  · Orders  o STD Panel (HSV 1 and 2 IgG/IgM, HIV, RPR, GC/Chlamydia) [facility] (75661, 33051, 08653, 95273, 94254, CTNGX, ) - V01.6/Z20.2 - 02/15/2021  o Wound Culture with Sensitivities if indicated [facility] ([id number]) - 782.1/R21 - 02/15/2021  o Physical, Primary Care Panel (CBC, CMP, Lipid, TSH) [facility] ([id number], 12206, 91796, 63015) - 790.29/R73.09, V81.2/Z13.6, 272.4/E78.5, 401.9/I10 - 08/15/2021  o Hgb A1c [facility] ([id number]) - 790.29/R73.09 - 08/15/2021  o ACO-14: Influenza immunization administered or previously received () - - 02/18/2021  o ACO-39: Current medications updated and reviewed (, 9712F) - - " 02/18/2021  · Medications  o Medications have been Reconciled  o Transition of Care or Provider Policy  · Instructions  o Electronically Identified Patient Education Materials Provided Electronically  · Disposition  o Call or Return if symptoms worsen or persist.  o Care Transition            Electronically Signed by: Jin Green MD -Author on February 18, 2021 05:23:15 PM

## 2021-05-15 VITALS
SYSTOLIC BLOOD PRESSURE: 134 MMHG | BODY MASS INDEX: 30.18 KG/M2 | WEIGHT: 164 LBS | HEART RATE: 77 BPM | DIASTOLIC BLOOD PRESSURE: 78 MMHG | TEMPERATURE: 98 F | OXYGEN SATURATION: 97 % | RESPIRATION RATE: 18 BRPM | HEIGHT: 62 IN

## 2021-05-15 VITALS
HEIGHT: 62 IN | TEMPERATURE: 97.8 F | DIASTOLIC BLOOD PRESSURE: 80 MMHG | HEART RATE: 65 BPM | SYSTOLIC BLOOD PRESSURE: 142 MMHG | OXYGEN SATURATION: 97 % | WEIGHT: 164.56 LBS | BODY MASS INDEX: 30.28 KG/M2 | DIASTOLIC BLOOD PRESSURE: 78 MMHG | SYSTOLIC BLOOD PRESSURE: 152 MMHG

## 2021-05-15 VITALS
DIASTOLIC BLOOD PRESSURE: 74 MMHG | WEIGHT: 167.5 LBS | SYSTOLIC BLOOD PRESSURE: 136 MMHG | HEART RATE: 79 BPM | HEIGHT: 62 IN | BODY MASS INDEX: 30.82 KG/M2 | OXYGEN SATURATION: 98 %

## 2021-05-16 VITALS
DIASTOLIC BLOOD PRESSURE: 76 MMHG | BODY MASS INDEX: 29.48 KG/M2 | TEMPERATURE: 97.9 F | WEIGHT: 160.19 LBS | HEART RATE: 74 BPM | HEIGHT: 62 IN | SYSTOLIC BLOOD PRESSURE: 179 MMHG | OXYGEN SATURATION: 97 %

## 2021-05-16 VITALS — DIASTOLIC BLOOD PRESSURE: 80 MMHG | SYSTOLIC BLOOD PRESSURE: 144 MMHG

## 2021-05-16 VITALS
WEIGHT: 160.56 LBS | HEIGHT: 62 IN | DIASTOLIC BLOOD PRESSURE: 82 MMHG | HEART RATE: 71 BPM | TEMPERATURE: 98.3 F | BODY MASS INDEX: 29.55 KG/M2 | SYSTOLIC BLOOD PRESSURE: 160 MMHG | OXYGEN SATURATION: 96 %

## 2021-05-16 VITALS
SYSTOLIC BLOOD PRESSURE: 148 MMHG | DIASTOLIC BLOOD PRESSURE: 71 MMHG | OXYGEN SATURATION: 97 % | BODY MASS INDEX: 29.63 KG/M2 | TEMPERATURE: 98.1 F | HEIGHT: 62 IN | HEART RATE: 66 BPM | WEIGHT: 161 LBS

## 2021-05-16 VITALS
BODY MASS INDEX: 30.01 KG/M2 | DIASTOLIC BLOOD PRESSURE: 84 MMHG | SYSTOLIC BLOOD PRESSURE: 160 MMHG | OXYGEN SATURATION: 97 % | TEMPERATURE: 98.2 F | HEIGHT: 62 IN | HEART RATE: 74 BPM | WEIGHT: 163.06 LBS

## 2021-05-16 VITALS
BODY MASS INDEX: 29.89 KG/M2 | DIASTOLIC BLOOD PRESSURE: 83 MMHG | TEMPERATURE: 98.2 F | WEIGHT: 162.44 LBS | HEIGHT: 62 IN | HEART RATE: 71 BPM | SYSTOLIC BLOOD PRESSURE: 154 MMHG | OXYGEN SATURATION: 98 %

## 2021-05-16 VITALS — HEART RATE: 90 BPM | DIASTOLIC BLOOD PRESSURE: 85 MMHG | SYSTOLIC BLOOD PRESSURE: 160 MMHG

## 2021-07-22 ENCOUNTER — HOSPITAL ENCOUNTER (OUTPATIENT)
Dept: GENERAL RADIOLOGY | Facility: HOSPITAL | Age: 66
Discharge: HOME | End: 2021-07-22
Admitting: INTERNAL MEDICINE

## 2021-07-22 ENCOUNTER — TRANSCRIBE ORDERS (OUTPATIENT)
Dept: ADMINISTRATIVE | Facility: HOSPITAL | Age: 66
End: 2021-07-22

## 2021-07-22 DIAGNOSIS — R91.8 LUNG NODULES: ICD-10-CM

## 2021-07-22 DIAGNOSIS — R91.8 LUNG NODULES: Primary | ICD-10-CM

## 2021-07-22 PROCEDURE — 71046 X-RAY EXAM CHEST 2 VIEWS: CPT

## 2021-08-03 RX ORDER — CETIRIZINE HYDROCHLORIDE 10 MG/1
TABLET ORAL
Qty: 30 TABLET | Refills: 6 | Status: SHIPPED | OUTPATIENT
Start: 2021-08-03 | End: 2021-08-17 | Stop reason: SDUPTHER

## 2021-08-12 ENCOUNTER — TRANSCRIBE ORDERS (OUTPATIENT)
Dept: FAMILY MEDICINE CLINIC | Facility: CLINIC | Age: 66
End: 2021-08-12

## 2021-08-12 ENCOUNTER — LAB (OUTPATIENT)
Dept: LAB | Facility: HOSPITAL | Age: 66
End: 2021-08-12

## 2021-08-12 DIAGNOSIS — I10 ESSENTIAL HYPERTENSION, MALIGNANT: ICD-10-CM

## 2021-08-12 DIAGNOSIS — E78.5 HYPERLIPIDEMIA, UNSPECIFIED HYPERLIPIDEMIA TYPE: ICD-10-CM

## 2021-08-12 DIAGNOSIS — R73.09 IMPAIRED GLUCOSE TOLERANCE TEST: ICD-10-CM

## 2021-08-12 DIAGNOSIS — Z13.6 SCREENING FOR ISCHEMIC HEART DISEASE: ICD-10-CM

## 2021-08-12 DIAGNOSIS — R73.09 IMPAIRED GLUCOSE TOLERANCE TEST: Primary | ICD-10-CM

## 2021-08-12 LAB
BASOPHILS # BLD AUTO: 0.05 10*3/MM3 (ref 0–0.2)
BASOPHILS NFR BLD AUTO: 0.6 % (ref 0–1.5)
DEPRECATED RDW RBC AUTO: 39.4 FL (ref 37–54)
EOSINOPHIL # BLD AUTO: 0.35 10*3/MM3 (ref 0–0.4)
EOSINOPHIL NFR BLD AUTO: 4.1 % (ref 0.3–6.2)
ERYTHROCYTE [DISTWIDTH] IN BLOOD BY AUTOMATED COUNT: 11.9 % (ref 12.3–15.4)
HBA1C MFR BLD: 5.6 % (ref 4.8–5.6)
HCT VFR BLD AUTO: 43.6 % (ref 37.5–51)
HGB BLD-MCNC: 14.9 G/DL (ref 13–17.7)
IMM GRANULOCYTES # BLD AUTO: 0.03 10*3/MM3 (ref 0–0.05)
IMM GRANULOCYTES NFR BLD AUTO: 0.4 % (ref 0–0.5)
LYMPHOCYTES # BLD AUTO: 1.96 10*3/MM3 (ref 0.7–3.1)
LYMPHOCYTES NFR BLD AUTO: 23 % (ref 19.6–45.3)
MCH RBC QN AUTO: 31.1 PG (ref 26.6–33)
MCHC RBC AUTO-ENTMCNC: 34.2 G/DL (ref 31.5–35.7)
MCV RBC AUTO: 91 FL (ref 79–97)
MONOCYTES # BLD AUTO: 0.59 10*3/MM3 (ref 0.1–0.9)
MONOCYTES NFR BLD AUTO: 6.9 % (ref 5–12)
NEUTROPHILS NFR BLD AUTO: 5.54 10*3/MM3 (ref 1.7–7)
NEUTROPHILS NFR BLD AUTO: 65 % (ref 42.7–76)
NRBC BLD AUTO-RTO: 0 /100 WBC (ref 0–0.2)
PLATELET # BLD AUTO: 153 10*3/MM3 (ref 140–450)
PMV BLD AUTO: 11.4 FL (ref 6–12)
RBC # BLD AUTO: 4.79 10*6/MM3 (ref 4.14–5.8)
WBC # BLD AUTO: 8.52 10*3/MM3 (ref 3.4–10.8)

## 2021-08-12 PROCEDURE — 84443 ASSAY THYROID STIM HORMONE: CPT

## 2021-08-12 PROCEDURE — 80053 COMPREHEN METABOLIC PANEL: CPT

## 2021-08-12 PROCEDURE — 36415 COLL VENOUS BLD VENIPUNCTURE: CPT

## 2021-08-12 PROCEDURE — 80061 LIPID PANEL: CPT

## 2021-08-12 PROCEDURE — 83036 HEMOGLOBIN GLYCOSYLATED A1C: CPT

## 2021-08-12 PROCEDURE — 85025 COMPLETE CBC W/AUTO DIFF WBC: CPT

## 2021-08-13 LAB
ALBUMIN SERPL-MCNC: 4.2 G/DL (ref 3.5–5.2)
ALBUMIN/GLOB SERPL: 1.4 G/DL
ALP SERPL-CCNC: 124 U/L (ref 39–117)
ALT SERPL W P-5'-P-CCNC: 22 U/L (ref 1–41)
ANION GAP SERPL CALCULATED.3IONS-SCNC: 9.3 MMOL/L (ref 5–15)
AST SERPL-CCNC: 20 U/L (ref 1–40)
BILIRUB SERPL-MCNC: 0.3 MG/DL (ref 0–1.2)
BUN SERPL-MCNC: 13 MG/DL (ref 8–23)
BUN/CREAT SERPL: 10.4 (ref 7–25)
CALCIUM SPEC-SCNC: 8.8 MG/DL (ref 8.6–10.5)
CHLORIDE SERPL-SCNC: 106 MMOL/L (ref 98–107)
CHOLEST SERPL-MCNC: 195 MG/DL (ref 0–200)
CO2 SERPL-SCNC: 25.7 MMOL/L (ref 22–29)
CREAT SERPL-MCNC: 1.25 MG/DL (ref 0.76–1.27)
GFR SERPL CREATININE-BSD FRML MDRD: 58 ML/MIN/1.73
GLOBULIN UR ELPH-MCNC: 2.9 GM/DL
GLUCOSE SERPL-MCNC: 91 MG/DL (ref 65–99)
HDLC SERPL-MCNC: 39 MG/DL (ref 40–60)
LDLC SERPL CALC-MCNC: 134 MG/DL (ref 0–100)
LDLC/HDLC SERPL: 3.36 {RATIO}
POTASSIUM SERPL-SCNC: 4.2 MMOL/L (ref 3.5–5.2)
PROT SERPL-MCNC: 7.1 G/DL (ref 6–8.5)
SODIUM SERPL-SCNC: 141 MMOL/L (ref 136–145)
TRIGL SERPL-MCNC: 124 MG/DL (ref 0–150)
TSH SERPL DL<=0.05 MIU/L-ACNC: 3.21 UIU/ML (ref 0.27–4.2)
VLDLC SERPL-MCNC: 22 MG/DL (ref 5–40)

## 2021-08-17 ENCOUNTER — OFFICE VISIT (OUTPATIENT)
Dept: FAMILY MEDICINE CLINIC | Facility: CLINIC | Age: 66
End: 2021-08-17

## 2021-08-17 VITALS
WEIGHT: 174.2 LBS | TEMPERATURE: 98.5 F | HEART RATE: 79 BPM | SYSTOLIC BLOOD PRESSURE: 144 MMHG | DIASTOLIC BLOOD PRESSURE: 82 MMHG | OXYGEN SATURATION: 95 % | BODY MASS INDEX: 31.86 KG/M2 | RESPIRATION RATE: 18 BRPM

## 2021-08-17 DIAGNOSIS — E55.9 VITAMIN D DEFICIENCY: ICD-10-CM

## 2021-08-17 DIAGNOSIS — K21.9 GASTROESOPHAGEAL REFLUX DISEASE WITHOUT ESOPHAGITIS: ICD-10-CM

## 2021-08-17 DIAGNOSIS — I10 ESSENTIAL (PRIMARY) HYPERTENSION: ICD-10-CM

## 2021-08-17 DIAGNOSIS — Z00.00 WELLNESS EXAMINATION: ICD-10-CM

## 2021-08-17 DIAGNOSIS — Z12.5 PROSTATE CANCER SCREENING: ICD-10-CM

## 2021-08-17 DIAGNOSIS — R53.83 FATIGUE, UNSPECIFIED TYPE: ICD-10-CM

## 2021-08-17 DIAGNOSIS — R73.09 ELEVATED GLUCOSE: ICD-10-CM

## 2021-08-17 DIAGNOSIS — L30.9 ECZEMA, UNSPECIFIED TYPE: ICD-10-CM

## 2021-08-17 DIAGNOSIS — E78.2 MIXED HYPERLIPIDEMIA: ICD-10-CM

## 2021-08-17 DIAGNOSIS — Z23 NEED FOR PNEUMOCOCCAL VACCINATION: Primary | ICD-10-CM

## 2021-08-17 PROBLEM — J44.9 CHRONIC OBSTRUCTIVE PULMONARY DISEASE: Status: ACTIVE | Noted: 2021-08-17

## 2021-08-17 PROBLEM — Z87.891 EX-SMOKER: Status: ACTIVE | Noted: 2021-08-17

## 2021-08-17 PROBLEM — R91.8 MULTIPLE NODULES OF LUNG: Status: ACTIVE | Noted: 2021-08-17

## 2021-08-17 PROBLEM — R06.02 SHORTNESS OF BREATH: Status: ACTIVE | Noted: 2021-08-17

## 2021-08-17 PROBLEM — J45.909 ASTHMA: Status: ACTIVE | Noted: 2021-08-17

## 2021-08-17 PROBLEM — E86.0 LUETSCHER'S SYNDROME: Status: ACTIVE | Noted: 2021-08-17

## 2021-08-17 PROBLEM — F32.A DEPRESSION: Status: ACTIVE | Noted: 2021-08-17

## 2021-08-17 PROBLEM — M19.90 ARTHRITIS: Status: ACTIVE | Noted: 2021-08-17

## 2021-08-17 PROBLEM — I11.9 HYPERTENSIVE HEART DISEASE WITHOUT CONGESTIVE HEART FAILURE: Status: ACTIVE | Noted: 2021-08-17

## 2021-08-17 PROBLEM — S09.90XA HEAD INJURY: Status: ACTIVE | Noted: 2021-08-17

## 2021-08-17 PROBLEM — J98.4 LUNG DISEASE: Status: ACTIVE | Noted: 2021-08-17

## 2021-08-17 PROBLEM — S92.002D CLOSED NONDISPLACED FRACTURE OF LEFT CALCANEUS WITH ROUTINE HEALING: Status: ACTIVE | Noted: 2017-08-29

## 2021-08-17 PROBLEM — F41.9 ANXIETY: Status: ACTIVE | Noted: 2021-08-17

## 2021-08-17 PROBLEM — E78.5 HYPERLIPEMIA: Status: ACTIVE | Noted: 2021-08-17

## 2021-08-17 PROBLEM — D64.9 ANEMIA: Status: ACTIVE | Noted: 2021-08-17

## 2021-08-17 PROBLEM — T14.8XXA BROKEN BONES: Status: ACTIVE | Noted: 2021-08-17

## 2021-08-17 PROBLEM — H93.19 RINGING IN EARS: Status: ACTIVE | Noted: 2021-08-17

## 2021-08-17 PROBLEM — K57.92 DIVERTICULITIS: Status: ACTIVE | Noted: 2021-08-17

## 2021-08-17 PROBLEM — R09.89 CARDIOVASCULAR SYMPTOMS: Status: ACTIVE | Noted: 2021-08-17

## 2021-08-17 PROCEDURE — 99396 PREV VISIT EST AGE 40-64: CPT | Performed by: FAMILY MEDICINE

## 2021-08-17 PROCEDURE — 90732 PPSV23 VACC 2 YRS+ SUBQ/IM: CPT | Performed by: FAMILY MEDICINE

## 2021-08-17 PROCEDURE — 90471 IMMUNIZATION ADMIN: CPT | Performed by: FAMILY MEDICINE

## 2021-08-17 RX ORDER — OMEPRAZOLE 10 MG/1
10 CAPSULE, DELAYED RELEASE ORAL DAILY
Qty: 90 CAPSULE | Refills: 1 | Status: SHIPPED | OUTPATIENT
Start: 2021-08-17 | End: 2021-08-17

## 2021-08-17 RX ORDER — CETIRIZINE HYDROCHLORIDE 10 MG/1
10 TABLET ORAL DAILY
Qty: 90 TABLET | Refills: 1 | Status: SHIPPED | OUTPATIENT
Start: 2021-08-17 | End: 2021-08-17 | Stop reason: SDUPTHER

## 2021-08-17 RX ORDER — IPRATROPIUM/ALBUTEROL SULFATE 20-100 MCG
1 MIST INHALER (GRAM) INHALATION DAILY
COMMUNITY
Start: 2021-02-15 | End: 2022-01-28

## 2021-08-17 RX ORDER — LISINOPRIL 20 MG/1
20 TABLET ORAL DAILY
COMMUNITY
Start: 2021-02-15 | End: 2021-08-17

## 2021-08-17 RX ORDER — OMEPRAZOLE 10 MG/1
10 CAPSULE, DELAYED RELEASE ORAL DAILY
COMMUNITY
End: 2021-08-17 | Stop reason: SDUPTHER

## 2021-08-17 RX ORDER — AMLODIPINE BESYLATE 5 MG/1
5 TABLET ORAL DAILY
COMMUNITY
End: 2021-08-17

## 2021-08-17 RX ORDER — OMEPRAZOLE 10 MG/1
20 CAPSULE, DELAYED RELEASE ORAL DAILY
Qty: 90 CAPSULE | Refills: 1 | Status: SHIPPED | OUTPATIENT
Start: 2021-08-17 | End: 2021-10-11 | Stop reason: SDUPTHER

## 2021-08-17 RX ORDER — TRIAMCINOLONE ACETONIDE 5 MG/G
CREAM TOPICAL
Qty: 15 G | Refills: 1 | Status: SHIPPED | OUTPATIENT
Start: 2021-08-17 | End: 2022-05-11

## 2021-08-17 RX ORDER — LISINOPRIL 20 MG/1
20 TABLET ORAL DAILY
Qty: 90 TABLET | Refills: 1 | Status: SHIPPED | OUTPATIENT
Start: 2021-08-17 | End: 2022-07-03

## 2021-08-17 RX ORDER — HYDROCHLOROTHIAZIDE 12.5 MG/1
12.5 TABLET ORAL DAILY
COMMUNITY
Start: 2021-06-16 | End: 2022-07-13

## 2021-08-17 RX ORDER — METOPROLOL SUCCINATE 25 MG/1
25 TABLET, EXTENDED RELEASE ORAL 2 TIMES DAILY
Qty: 180 TABLET | Refills: 1 | Status: SHIPPED | OUTPATIENT
Start: 2021-08-17

## 2021-08-17 RX ORDER — CETIRIZINE HYDROCHLORIDE 10 MG/1
10 TABLET ORAL DAILY
Qty: 90 TABLET | Refills: 1 | Status: SHIPPED | OUTPATIENT
Start: 2021-08-17 | End: 2022-02-01

## 2021-08-17 NOTE — PROGRESS NOTES
Chief Complaint   Patient presents with   • Annual Exam     Subjective   Jonathan Guerra is a 65 y.o. male who presents to the office for follow-up.     The following portions of the patient's history were reviewed and updated as appropriate: allergies, current medications, past family history, past medical history, past social history, past surgical history and problem list.    Review of Systems   Constitutional: Negative for chills, fever and unexpected weight loss.   Respiratory: Negative for cough, chest tightness and shortness of breath.    Cardiovascular: Negative for chest pain and palpitations.   Gastrointestinal: Negative for abdominal pain, constipation, diarrhea, nausea and vomiting.        Objective   Vitals:    08/17/21 0736   BP: 144/82   BP Location: Left arm   Patient Position: Sitting   Cuff Size: Adult   Pulse: 79   Resp: 18   Temp: 98.5 °F (36.9 °C)   SpO2: 95%   Weight: 79 kg (174 lb 3.2 oz)     Body mass index is 31.86 kg/m².  Physical Exam  Vitals reviewed.   Constitutional:       General: He is not in acute distress.     Appearance: Normal appearance. He is not ill-appearing.   HENT:      Head: Normocephalic.   Eyes:      Conjunctiva/sclera: Conjunctivae normal.   Cardiovascular:      Rate and Rhythm: Normal rate and regular rhythm.   Pulmonary:      Effort: Pulmonary effort is normal.      Breath sounds: Normal breath sounds.   Skin:     Findings: No lesion or rash.   Neurological:      Mental Status: He is alert and oriented to person, place, and time.   Psychiatric:         Mood and Affect: Mood normal.          Assessment/Plan   Diagnoses and all orders for this visit:    1. Wellness examination    2. Essential (primary) hypertension  -     CBC Auto Differential; Future  -     Comprehensive Metabolic Panel; Future  -     Lipid Panel; Future  -     TSH; Future  -     T4, Free; Future  -     Microalbumin / Creatinine Urine Ratio - Urine, Clean Catch; Future  -     Urinalysis With  Microscopic If Indicated (No Culture) - Urine, Clean Catch; Future    3. Vitamin D deficiency  -     Vitamin D 25 Hydroxy; Future    4. Fatigue, unspecified type  -     Vitamin B12 & Folate; Future    5. Prostate cancer screening  -     PSA Screen; Future    6. Elevated glucose  -     Hemoglobin A1c; Future    7. Mixed hyperlipidemia    8. Gastroesophageal reflux disease without esophagitis    9. Eczema, unspecified type    Other orders  -     Discontinue: omeprazole (prilOSEC) 10 MG capsule; Take 1 capsule by mouth Daily.  Dispense: 90 capsule; Refill: 1  -     Discontinue: cetirizine (zyrTEC) 10 MG tablet; Take 1 tablet by mouth Daily.  Dispense: 90 tablet; Refill: 1  -     lisinopril (PRINIVIL,ZESTRIL) 20 MG tablet; Take 1 tablet by mouth Daily.  Dispense: 90 tablet; Refill: 1  -     metoprolol succinate XL (Toprol XL) 25 MG 24 hr tablet; Take 1 tablet by mouth 2 (two) times a day.  Dispense: 180 tablet; Refill: 1  -     cetirizine (zyrTEC) 10 MG tablet; Take 1 tablet by mouth Daily.  Dispense: 90 tablet; Refill: 1  -     omeprazole (prilOSEC) 10 MG capsule; Take 2 capsules by mouth Daily.  Dispense: 90 capsule; Refill: 1  -     triamcinolone (KENALOG) 0.5 % cream; Apply thin amount  to affected areas of hands twice a day for 20 days  Dispense: 15 g; Refill: 1         f/u in 6 months  See dermatologist on a regular basis  Low-fat diet.  LDL mildly elevated today  See dentist and eye doctor on a regular basis.  Steroid cream for eczema palms of the hands  Increase metoprolol to twice a day  Restart omeprazole  No follow-ups on file.   PHQ-2/PHQ-9 Depression Screening 8/17/2021   Little interest or pleasure in doing things 0   Feeling down, depressed, or hopeless 0   Total Score 0

## 2021-10-11 ENCOUNTER — OFFICE VISIT (OUTPATIENT)
Dept: FAMILY MEDICINE CLINIC | Facility: CLINIC | Age: 66
End: 2021-10-11

## 2021-10-11 VITALS
SYSTOLIC BLOOD PRESSURE: 138 MMHG | RESPIRATION RATE: 18 BRPM | BODY MASS INDEX: 31.31 KG/M2 | TEMPERATURE: 97.3 F | HEART RATE: 81 BPM | DIASTOLIC BLOOD PRESSURE: 80 MMHG | OXYGEN SATURATION: 97 % | WEIGHT: 171.2 LBS

## 2021-10-11 DIAGNOSIS — R53.83 FATIGUE, UNSPECIFIED TYPE: ICD-10-CM

## 2021-10-11 DIAGNOSIS — I10 ESSENTIAL (PRIMARY) HYPERTENSION: ICD-10-CM

## 2021-10-11 DIAGNOSIS — K21.9 GASTROESOPHAGEAL REFLUX DISEASE WITHOUT ESOPHAGITIS: Primary | ICD-10-CM

## 2021-10-11 DIAGNOSIS — Z23 NEED FOR INFLUENZA VACCINATION: ICD-10-CM

## 2021-10-11 DIAGNOSIS — E55.9 VITAMIN D DEFICIENCY: ICD-10-CM

## 2021-10-11 PROCEDURE — 90471 IMMUNIZATION ADMIN: CPT | Performed by: FAMILY MEDICINE

## 2021-10-11 PROCEDURE — 99213 OFFICE O/P EST LOW 20 MIN: CPT | Performed by: FAMILY MEDICINE

## 2021-10-11 PROCEDURE — 90686 IIV4 VACC NO PRSV 0.5 ML IM: CPT | Performed by: FAMILY MEDICINE

## 2021-10-11 RX ORDER — OMEPRAZOLE 20 MG/1
20 CAPSULE, DELAYED RELEASE ORAL DAILY
Qty: 90 CAPSULE | Refills: 1 | Status: SHIPPED | OUTPATIENT
Start: 2021-10-11 | End: 2022-03-29

## 2021-10-11 NOTE — PROGRESS NOTES
Chief Complaint   Patient presents with   • Hyperlipidemia     1 month follow up    • Hypertension     Subjective   Jonathan Guerra is a 65 y.o. male who presents to the office for follow-up.       presents for follow-up    Hx of anemia.  corrected.      Hx of COPD. He sees Pulm on regular basis, Dr. Warner. controlled.      Hx of HTN. pt is on metoprolol and lisinopril. Better controlled, with the doubling up on the metoprolol.     Hx of heart palpitations.   Controlled with metoprolol.    Hx of allergic rhinitis. controlled.     Hx of arhthritis pains in the joints. controlled on meloxicam.    Hx of headaches.  Controlled.     Hx of GERD.uncontrolled. the prilosec was sent in as 10 mg instead of 20 mg.    He did cut back on the sausage and high fat foods. His cholesterol was borderline high on last visit.   The following portions of the patient's history were reviewed and updated as appropriate: allergies, current medications, past family history, past medical history, past social history, past surgical history and problem list.    Review of Systems   Constitutional: Negative for chills, fever and unexpected weight loss.   Respiratory: Negative for cough, chest tightness and shortness of breath.    Cardiovascular: Negative for chest pain and palpitations.   Gastrointestinal: Negative for abdominal pain, constipation, diarrhea, nausea and vomiting.        Objective   Vitals:    10/11/21 0813   BP: 138/80   BP Location: Left arm   Patient Position: Sitting   Cuff Size: Adult   Pulse: 81   Resp: 18   Temp: 97.3 °F (36.3 °C)   SpO2: 97%   Weight: 77.7 kg (171 lb 3.2 oz)     Body mass index is 31.31 kg/m².  Physical Exam  Vitals reviewed.   Constitutional:       General: He is not in acute distress.     Appearance: Normal appearance. He is not ill-appearing.   HENT:      Head: Normocephalic.   Eyes:      Conjunctiva/sclera: Conjunctivae normal.   Cardiovascular:      Rate and Rhythm: Normal rate and regular  rhythm.   Pulmonary:      Effort: Pulmonary effort is normal.      Breath sounds: Normal breath sounds.   Skin:     Findings: No lesion or rash.   Neurological:      Mental Status: He is alert and oriented to person, place, and time.   Psychiatric:         Mood and Affect: Mood normal.          Assessment/Plan   Diagnoses and all orders for this visit:    1. Gastroesophageal reflux disease without esophagitis (Primary)  Comments:  the omeprazole was increased to 20 mg.    2. Essential (primary) hypertension  Comments:  continue with current dose of the BP meds.    3. Fatigue, unspecified type    4. Vitamin D deficiency    5. Need for influenza vaccination  -     FluLaval/Fluarix >6 Months ([id number])    Other orders  -     omeprazole (priLOSEC) 20 MG capsule; Take 1 capsule by mouth Daily.  Dispense: 90 capsule; Refill: 1               Return in about 6 months (around 4/11/2022).   PHQ-2/PHQ-9 Depression Screening 8/17/2021   Little interest or pleasure in doing things 0   Feeling down, depressed, or hopeless 0   Total Score 0

## 2022-01-27 RX ORDER — IPRATROPIUM/ALBUTEROL SULFATE 20-100 MCG
1 MIST INHALER (GRAM) INHALATION DAILY
OUTPATIENT
Start: 2022-01-27

## 2022-01-27 NOTE — TELEPHONE ENCOUNTER
Caller: ADRIANNA ROCHE    Relationship: FRIEND    Best call back number: [phone]    Requested Prescriptions:   Requested Prescriptions     Pending Prescriptions Disp Refills   • ipratropium-albuterol (Combivent Respimat)  MCG/ACT inhaler       Sig: Inhale 1 puff Daily.   AS WELL AS:  cetirizine (zyrTEC) 10 MG tablet     Pharmacy where request should be sent: [facility] [place], [place] - [facility] [address] [facility].  [phone] [facility] [phone] FX     Does the patient have less than a 3 day supply:  [x] Yes  [] No    Melisa MUNGUIA Rep   01/27/22 13:09 EST

## 2022-01-28 RX ORDER — IPRATROPIUM/ALBUTEROL SULFATE 20-100 MCG
MIST INHALER (GRAM) INHALATION
Qty: 4 G | Refills: 7 | Status: SHIPPED | OUTPATIENT
Start: 2022-01-28 | End: 2022-07-13

## 2022-02-01 RX ORDER — CETIRIZINE HYDROCHLORIDE 10 MG/1
TABLET ORAL
Qty: 30 TABLET | Refills: 2 | Status: SHIPPED | OUTPATIENT
Start: 2022-02-01 | End: 2022-09-07 | Stop reason: SDUPTHER

## 2022-03-02 ENCOUNTER — APPOINTMENT (OUTPATIENT)
Dept: GENERAL RADIOLOGY | Facility: HOSPITAL | Age: 67
End: 2022-03-02

## 2022-03-02 ENCOUNTER — HOSPITAL ENCOUNTER (EMERGENCY)
Facility: HOSPITAL | Age: 67
Discharge: HOME | End: 2022-03-03
Attending: EMERGENCY MEDICINE | Admitting: EMERGENCY MEDICINE

## 2022-03-02 DIAGNOSIS — J44.0 CHRONIC OBSTRUCTIVE PULMONARY DISEASE WITH ACUTE LOWER RESPIRATORY INFECTION: ICD-10-CM

## 2022-03-02 DIAGNOSIS — J18.9 PNEUMONIA OF RIGHT UPPER LOBE DUE TO INFECTIOUS ORGANISM: Primary | ICD-10-CM

## 2022-03-02 DIAGNOSIS — Z20.822 SUSPECTED COVID-19 VIRUS INFECTION: ICD-10-CM

## 2022-03-02 LAB
ALBUMIN SERPL-MCNC: 4.4 G/DL (ref 3.5–5.2)
ALBUMIN/GLOB SERPL: 1.5 G/DL
ALP SERPL-CCNC: 119 U/L (ref 39–117)
ALT SERPL W P-5'-P-CCNC: 18 U/L (ref 1–41)
ANION GAP SERPL CALCULATED.3IONS-SCNC: 14 MMOL/L (ref 5–15)
AST SERPL-CCNC: 20 U/L (ref 1–40)
BASOPHILS # BLD AUTO: 0.05 10*3/MM3 (ref 0–0.2)
BASOPHILS NFR BLD AUTO: 0.3 % (ref 0–1.5)
BILIRUB SERPL-MCNC: 0.5 MG/DL (ref 0–1.2)
BUN SERPL-MCNC: 12 MG/DL (ref 8–23)
BUN/CREAT SERPL: 10.4 (ref 7–25)
CALCIUM SPEC-SCNC: 9.1 MG/DL (ref 8.6–10.5)
CHLORIDE SERPL-SCNC: 105 MMOL/L (ref 98–107)
CO2 SERPL-SCNC: 22 MMOL/L (ref 22–29)
CREAT SERPL-MCNC: 1.15 MG/DL (ref 0.76–1.27)
DEPRECATED RDW RBC AUTO: 43.4 FL (ref 37–54)
EGFRCR SERPLBLD CKD-EPI 2021: 71.1 ML/MIN/1.73
EOSINOPHIL # BLD AUTO: 0.18 10*3/MM3 (ref 0–0.4)
EOSINOPHIL NFR BLD AUTO: 1.2 % (ref 0.3–6.2)
ERYTHROCYTE [DISTWIDTH] IN BLOOD BY AUTOMATED COUNT: 12.7 % (ref 12.3–15.4)
FLUAV AG NPH QL: NEGATIVE
FLUBV AG NPH QL IA: NEGATIVE
GLOBULIN UR ELPH-MCNC: 2.9 GM/DL
GLUCOSE SERPL-MCNC: 105 MG/DL (ref 65–99)
HCT VFR BLD AUTO: 41.7 % (ref 37.5–51)
HGB BLD-MCNC: 13.9 G/DL (ref 13–17.7)
HOLD SPECIMEN: NORMAL
HOLD SPECIMEN: NORMAL
IMM GRANULOCYTES # BLD AUTO: 0.04 10*3/MM3 (ref 0–0.05)
IMM GRANULOCYTES NFR BLD AUTO: 0.3 % (ref 0–0.5)
LYMPHOCYTES # BLD AUTO: 2.37 10*3/MM3 (ref 0.7–3.1)
LYMPHOCYTES NFR BLD AUTO: 16.3 % (ref 19.6–45.3)
MCH RBC QN AUTO: 30.8 PG (ref 26.6–33)
MCHC RBC AUTO-ENTMCNC: 33.3 G/DL (ref 31.5–35.7)
MCV RBC AUTO: 92.5 FL (ref 79–97)
MONOCYTES # BLD AUTO: 1.05 10*3/MM3 (ref 0.1–0.9)
MONOCYTES NFR BLD AUTO: 7.2 % (ref 5–12)
NEUTROPHILS NFR BLD AUTO: 10.87 10*3/MM3 (ref 1.7–7)
NEUTROPHILS NFR BLD AUTO: 74.7 % (ref 42.7–76)
NRBC BLD AUTO-RTO: 0 /100 WBC (ref 0–0.2)
NT-PROBNP SERPL-MCNC: 161.7 PG/ML (ref 0–900)
PLATELET # BLD AUTO: 181 10*3/MM3 (ref 140–450)
PMV BLD AUTO: 11.2 FL (ref 6–12)
POTASSIUM SERPL-SCNC: 3.2 MMOL/L (ref 3.5–5.2)
PROT SERPL-MCNC: 7.3 G/DL (ref 6–8.5)
RBC # BLD AUTO: 4.51 10*6/MM3 (ref 4.14–5.8)
SODIUM SERPL-SCNC: 141 MMOL/L (ref 136–145)
TROPONIN T SERPL-MCNC: <0.01 NG/ML (ref 0–0.03)
WBC NRBC COR # BLD: 14.56 10*3/MM3 (ref 3.4–10.8)
WHOLE BLOOD HOLD SPECIMEN: NORMAL
WHOLE BLOOD HOLD SPECIMEN: NORMAL

## 2022-03-02 PROCEDURE — 84484 ASSAY OF TROPONIN QUANT: CPT

## 2022-03-02 PROCEDURE — 94799 UNLISTED PULMONARY SVC/PX: CPT

## 2022-03-02 PROCEDURE — 71045 X-RAY EXAM CHEST 1 VIEW: CPT

## 2022-03-02 PROCEDURE — 83880 ASSAY OF NATRIURETIC PEPTIDE: CPT

## 2022-03-02 PROCEDURE — 96374 THER/PROPH/DIAG INJ IV PUSH: CPT

## 2022-03-02 PROCEDURE — 93005 ELECTROCARDIOGRAM TRACING: CPT | Performed by: EMERGENCY MEDICINE

## 2022-03-02 PROCEDURE — 93005 ELECTROCARDIOGRAM TRACING: CPT

## 2022-03-02 PROCEDURE — 99284 EMERGENCY DEPT VISIT MOD MDM: CPT

## 2022-03-02 PROCEDURE — 93010 ELECTROCARDIOGRAM REPORT: CPT | Performed by: INTERNAL MEDICINE

## 2022-03-02 PROCEDURE — 25010000002 METHYLPREDNISOLONE PER 125 MG: Performed by: EMERGENCY MEDICINE

## 2022-03-02 PROCEDURE — 80053 COMPREHEN METABOLIC PANEL: CPT

## 2022-03-02 PROCEDURE — 85025 COMPLETE CBC W/AUTO DIFF WBC: CPT

## 2022-03-02 PROCEDURE — U0004 COV-19 TEST NON-CDC HGH THRU: HCPCS | Performed by: EMERGENCY MEDICINE

## 2022-03-02 PROCEDURE — 36415 COLL VENOUS BLD VENIPUNCTURE: CPT

## 2022-03-02 PROCEDURE — 94640 AIRWAY INHALATION TREATMENT: CPT

## 2022-03-02 PROCEDURE — 87804 INFLUENZA ASSAY W/OPTIC: CPT

## 2022-03-02 RX ORDER — SODIUM CHLORIDE 0.9 % (FLUSH) 0.9 %
10 SYRINGE (ML) INJECTION AS NEEDED
Status: DISCONTINUED | OUTPATIENT
Start: 2022-03-02 | End: 2022-03-03 | Stop reason: HOSPADM

## 2022-03-02 RX ORDER — IPRATROPIUM BROMIDE AND ALBUTEROL SULFATE 2.5; .5 MG/3ML; MG/3ML
3 SOLUTION RESPIRATORY (INHALATION) ONCE
Status: COMPLETED | OUTPATIENT
Start: 2022-03-02 | End: 2022-03-02

## 2022-03-02 RX ORDER — METHYLPREDNISOLONE SODIUM SUCCINATE 125 MG/2ML
125 INJECTION, POWDER, LYOPHILIZED, FOR SOLUTION INTRAMUSCULAR; INTRAVENOUS ONCE
Status: COMPLETED | OUTPATIENT
Start: 2022-03-02 | End: 2022-03-02

## 2022-03-02 RX ADMIN — IPRATROPIUM BROMIDE AND ALBUTEROL SULFATE 3 ML: 2.5; .5 SOLUTION RESPIRATORY (INHALATION) at 23:39

## 2022-03-02 RX ADMIN — METHYLPREDNISOLONE SODIUM SUCCINATE 125 MG: 125 INJECTION, POWDER, FOR SOLUTION INTRAMUSCULAR; INTRAVENOUS at 23:47

## 2022-03-03 VITALS
HEART RATE: 100 BPM | SYSTOLIC BLOOD PRESSURE: 142 MMHG | TEMPERATURE: 98.2 F | HEIGHT: 61 IN | WEIGHT: 168.65 LBS | OXYGEN SATURATION: 90 % | DIASTOLIC BLOOD PRESSURE: 73 MMHG | BODY MASS INDEX: 31.84 KG/M2 | RESPIRATION RATE: 23 BRPM

## 2022-03-03 LAB
QT INTERVAL: 346 MS
SARS-COV-2 RNA PNL SPEC NAA+PROBE: NOT DETECTED

## 2022-03-03 RX ORDER — DOXYCYCLINE 100 MG/1
100 CAPSULE ORAL 2 TIMES DAILY
Qty: 20 CAPSULE | Refills: 0 | Status: SHIPPED | OUTPATIENT
Start: 2022-03-03 | End: 2022-05-11

## 2022-03-03 RX ORDER — DOXYCYCLINE 100 MG/1
100 CAPSULE ORAL ONCE
Status: COMPLETED | OUTPATIENT
Start: 2022-03-03 | End: 2022-03-03

## 2022-03-03 RX ORDER — DEXAMETHASONE 4 MG/1
4 TABLET ORAL 2 TIMES DAILY WITH MEALS
Qty: 14 TABLET | Refills: 0 | Status: SHIPPED | OUTPATIENT
Start: 2022-03-03 | End: 2022-05-11

## 2022-03-03 RX ADMIN — DOXYCYCLINE 100 MG: 100 CAPSULE ORAL at 01:47

## 2022-03-03 NOTE — ED PROVIDER NOTES
Time: 1:38 AM EST  Arrived by: Private vehicle  Chief Complaint: Shortness of breath  History provided by: Patient  History is limited by: N/A     History of Present Illness:  Patient is a 66 y.o.  male that presents to the emergency department with complaints of not feeling well for the last couple of days. Patient complains of having subjective fever along with chills. He states he has chronic shortness of air because he has COPD. Complains of body aches. He has felt nauseous. He also at times feels dizzy. Patient reports he has been vaccinated for COVID-19.    [facility]    Patient Care Team  Primary Care Provider: Jin Green MD    Past Medical History:     Allergies   Allergen Reactions   • Azithromycin Unknown - High Severity   • Codeine Unknown - High Severity   • Penicillins Unknown - High Severity     Past Medical History:   Diagnosis Date   • Acid reflux    • Acid reflux disease    • Anemia, unspecified    • Anxiety    • Arthritis    • Asthma    • Broken bones    • Calcaneus fracture, left    • Chronic bronchitis (HCC)    • COPD (chronic obstructive pulmonary disease) ([facility])    • Depression    • Diverticulitis    • Emphysema lung (HCC)    • HBP (high blood pressure)    • Head injury     skull fracture    • Hernia cerebri ([facility])    • HTN (hypertension)    • Hyperlipemia    • Lung disease      Past Surgical History:   Procedure Laterality Date   • COLONOSCOPY  2014, 2019   • ENDOSCOPY       Family History   Problem Relation Age of Onset   • Other Mother         Thyroid Gland Neoplasm   • Heart disease Mother    • Colon cancer Mother    • Diabetes Sister    • Colon cancer Paternal Grandmother        Home Medications:  Prior to Admission medications    Medication Sig Start Date End Date Taking? Authorizing Provider   cetirizine (zyrTEC) 10 MG tablet TAKE ONE TABLET BY MOUTH ONCE DAILY 2/1/22   Jin Green MD   Combivent Respimat  MCG/ACT inhaler INHALE 1 PUFF BY MOUTH BY MOUTH 4 TIMES DAILY. MAY USE 1  "EXTRA PUFF IF NEEDED. DON'T EXCEED 6 PUFFS PER 24 HOURS. 22   Jin Green MD   fluticasone-salmeterol (Advair Diskus) 250-50 MCG/DOSE DISKUS Inhale 1 puff 3 (Three) Times a Day.    ProviderDillon MD   hydroCHLOROthiazide (HYDRODIURIL) 12.5 MG tablet Take 12.5 mg by mouth Daily. 21   Dillon Mart MD   lisinopril (PRINIVIL,ZESTRIL) 20 MG tablet Take 1 tablet by mouth Daily. 21   Jin Green MD   metoprolol succinate XL (Toprol XL) 25 MG 24 hr tablet Take 1 tablet by mouth 2 (two) times a day. 21   Jin Green MD   omeprazole (priLOSEC) 20 MG capsule Take 1 capsule by mouth Daily. 10/11/21   Jin Green MD   triamcinolone (KENALOG) 0.5 % cream Apply thin amount  to affected areas of hands twice a day for 20 days 21   Jin Green MD        Social History:   Social History     Tobacco Use   • Smoking status: Former Smoker     Packs/day: 1.50     Years: 40.00     Pack years: 60.00     Types: Cigarettes     Quit date:      Years since quittin.1   • Smokeless tobacco: Never Used   Substance Use Topics   • Alcohol use: Never     Comment: Ocassional    • Drug use: Never       Review of Systems:  Review of Systems   Constitutional: Positive for chills and fever (Subjective).   HENT: Negative for congestion, ear pain and sore throat.    Eyes: Negative for pain.   Respiratory: Positive for cough and shortness of breath. Negative for chest tightness.    Cardiovascular: Negative for chest pain.   Gastrointestinal: Positive for nausea. Negative for abdominal pain, diarrhea and vomiting.   Genitourinary: Negative for flank pain and hematuria.   Musculoskeletal: Positive for myalgias. Negative for joint swelling.   Skin: Negative for pallor.   Neurological: Positive for dizziness. Negative for seizures and headaches.   All other systems reviewed and are negative.         Physical Exam:  /75   Pulse 97   Temp 98.2 °F (36.8 °C)   Resp 23   Ht 154.9 cm (61\")  "  Wt 76.5 kg (168 lb 10.4 oz)   SpO2 92%   BMI 31.87 kg/m²     Physical Exam Vital signs were reviewed under triage note.  General appearance - Patient appears well-developed and well-nourished.  Patient is in no acute distress.  Head - Normocephalic, atraumatic.  Pupils - Equal, round, reactive to light.  Extraocular muscles are intact.  Conjunctive is clear.  Nasal - Normal inspection.  No evidence of trauma or epistaxis.  Tympanic membranes - Gray, intact without erythema or retractions.  Oral mucosa - Pink and moist without lesions or erythema.  Uvula is midline.  Chest wall - Atraumatic.  Chest wall is nontender.  There is no vesicular rashes noted.  Neck - Supple.  Trachea was midline.  There is no palpable lymphadenopathy or thyromegaly.  There are no meningeal signs  Lungs -auscultation reveals decreased breath sounds in the bases with some mild scattered wheezes on expiration.  Heart - Regular rate and rhythm without any murmurs, clicks, or gallops.  Abdomen - Soft.  Bowel sounds are present.  There is no palpable tenderness.  There is no rebound, guarding, or rigidity.  There are no palpable masses.  There are no pulsatile masses.  Back - Spine is straight and midline.  There is no CVA tenderness.  Extremities - Intact x4 with full range of motion.  There is no palpable edema.  Pulses are intact x4 and equal.  Neurologic - Patient is awake, alert, and oriented x3.  Cranial nerves II through XII are grossly intact.  Motor and sensory functions grossly intact.  Cerebellar function was normal.  Integument - There are no rashes.  There are no petechia or purpura lesions noted.  There are no vesicular lesions noted.            Medications in the Emergency Department:  Medications   sodium chloride 0.9 % flush 10 mL (has no administration in time range)   doxycycline (MONODOX) capsule 100 mg (has no administration in time range)   methylPREDNISolone sodium succinate (SOLU-Medrol) injection 125 mg (125 mg  Intravenous Given 3/2/22 2347)   ipratropium-albuterol (DUO-NEB) nebulizer solution 3 mL (3 mL Nebulization Given 3/2/22 2339)   ipratropium-albuterol (DUO-NEB) nebulizer solution 3 mL (3 mL Nebulization Given 3/2/22 2339)        Labs  Lab Results (last 24 hours)     Procedure Component Value Units Date/Time    CBC & Differential [[id number]]  (Abnormal) Collected: 03/02/22 2013    Specimen: Blood Updated: 03/02/22 2052    Narrative:      The following orders were created for panel order CBC & Differential.  Procedure                               Abnormality         Status                     ---------                               -----------         ------                     CBC Auto Differential[[id number]]        Abnormal            Final result                 Please view results for these tests on the individual orders.    Comprehensive Metabolic Panel [[id number]]  (Abnormal) Collected: 03/02/22 2013    Specimen: Blood Updated: 03/02/22 2120     Glucose 105 mg/dL      BUN 12 mg/dL      Creatinine 1.15 mg/dL      Sodium 141 mmol/L      Potassium 3.2 mmol/L      Chloride 105 mmol/L      CO2 22.0 mmol/L      Calcium 9.1 mg/dL      Total Protein 7.3 g/dL      Albumin 4.40 g/dL      ALT (SGPT) 18 U/L      AST (SGOT) 20 U/L      Alkaline Phosphatase 119 U/L      Total Bilirubin 0.5 mg/dL      Globulin 2.9 gm/dL      A/G Ratio 1.5 g/dL      BUN/Creatinine Ratio 10.4     Anion Gap 14.0 mmol/L      eGFR 71.1 mL/min/1.73      Comment: National Kidney Foundation and American Society of Nephrology (ASN) Task Force recommended calculation based on the Chronic Kidney Disease Epidemiology Collaboration (CKD-EPI) equation refit without adjustment for race.       Narrative:      GFR Normal >60  Chronic Kidney Disease <60  Kidney Failure <15      BNP [[id number]]  (Normal) Collected: 03/02/22 2013    Specimen: Blood Updated: 03/02/22 2116     proBNP 161.7 pg/mL     Narrative:      Among patients with dyspnea, NT-proBNP is highly  sensitive for the detection of acute congestive heart failure. In addition NT-proBNP of <300 pg/ml effectively rules out acute congestive heart failure with 99% negative predictive value.    Results may be falsely decreased if patient taking Biotin.      Troponin [[id number]]  (Normal) Collected: 03/02/22 2013    Specimen: Blood Updated: 03/02/22 2119     Troponin T <0.010 ng/mL     Narrative:      Troponin T Reference Range:  <= 0.03 ng/mL-   Negative for AMI  >0.03 ng/mL-     Abnormal for myocardial necrosis.  Clinicians would have to utilize clinical acumen, EKG, Troponin and serial changes to determine if it is an Acute Myocardial Infarction or myocardial injury due to an underlying chronic condition.       Results may be falsely decreased if patient taking Biotin.      CBC Auto Differential [[id number]]  (Abnormal) Collected: 03/02/22 2013    Specimen: Blood Updated: 03/02/22 2052     WBC 14.56 10*3/mm3      RBC 4.51 10*6/mm3      Hemoglobin 13.9 g/dL      Hematocrit 41.7 %      MCV 92.5 fL      MCH 30.8 pg      MCHC 33.3 g/dL      RDW 12.7 %      RDW-SD 43.4 fl      MPV 11.2 fL      Platelets 181 10*3/mm3      Neutrophil % 74.7 %      Lymphocyte % 16.3 %      Monocyte % 7.2 %      Eosinophil % 1.2 %      Basophil % 0.3 %      Immature Grans % 0.3 %      Neutrophils, Absolute 10.87 10*3/mm3      Lymphocytes, Absolute 2.37 10*3/mm3      Monocytes, Absolute 1.05 10*3/mm3      Eosinophils, Absolute 0.18 10*3/mm3      Basophils, Absolute 0.05 10*3/mm3      Immature Grans, Absolute 0.04 10*3/mm3      nRBC 0.0 /100 WBC     Influenza Antigen, Rapid - Swab, Nasopharynx [[id number]]  (Normal) Collected: 03/02/22 2049    Specimen: Swab from Nasopharynx Updated: 03/02/22 2124     Influenza A Ag, EIA Negative     Influenza B Ag, EIA Negative    COVID-19,APTIMA PANTHER(DORIE),[facility], NP/OP SWAB IN UTM/VTM/[facility] TRANSPORT MEDIA,24 HR TAT - Swab, Nasopharynx [[id number]] Collected: 03/02/22 2341    Specimen: Swab from  Nasopharynx Updated: 03/02/22 2343           Imaging:  XR Chest 1 View    Result Date: 3/2/2022  PROCEDURE: XR CHEST 1 VW  COMPARISON: [facility], CR, XR CHEST 2 VW, 7/22/2021, 8:33.  INDICATIONS: Shortness of breath; difficulty breathing.  FINDINGS: A single PA view of the chest is provided for review.  Asymmetric opacification is seen in the right upper lobe and may represent pneumonia.  Consider imaging follow-up to ensure a benign progression.  No acute infiltrate is seen elsewhere.  No cardiac enlargement.  No pneumothorax.  Chronic calcified granulomatous disease involves the chest.  The thoracic aorta is atherosclerotic.       Right upper lobe pneumonia.     ISELA KOEHLER JR, MD       Electronically Signed and Approved By: ISELA KOEHLER JR, MD on 3/02/2022 at 21:44                EKG:      Procedures:  Procedures    Progress                      The patient was seen evaluated the ED by me.  The above history and physical examination was performed as documented.  The diagnostic data was obtained.  Results reviewed peer discussed with the patient.  Patient's chest x-ray shows a right upper lobe pneumonia.  Patient also has an elevated white count.  Patient was treated for bacterial pneumonia despite having the home Covid test that was positive.  His COVID-19  PCR test is still pending.  Patient also has a history of COPD./Patient be treated with antibiotics and steroids.  Patient already has home nebulizer and inhalers.  Patient was instructed to follow-up with his COVID-19 test results using the [facility].  Patient was instructed return to the ER for any change or worsening of his breathing status.      Medical Decision Making:  [place]     Final diagnoses:   Pneumonia of right upper lobe due to infectious organism   Suspected COVID-19 virus infection   Chronic obstructive pulmonary disease with acute lower respiratory infection (HCC)        Disposition:  ED Disposition     ED Disposition  Condition Comment    Discharge Stable            Manuel Vidal,   03/04/22 3086

## 2022-03-03 NOTE — ED NOTES
Patient reports to ER with body aches, chills. Patient reports he has been not feeling well for 2 days and bought a Covid swab today and tested positive. VS stable Lung sounds diminished in lower bases. Patient reports COPD        Kerrie Cardenas RN  03/02/22 2122

## 2022-03-03 NOTE — DISCHARGE INSTRUCTIONS
Rest at home.  Drink plenty of fluids.  Take the prescriptions as prescribed.  Follow-up with [facility] to find your COVID-19 test results tomorrow.  Follow-up with your primary care provider in 1 week.  Return to the ER for increasing shortness of breath, if your O2 saturations drop below 90%, persistent fevers greater than 101, or any other concerns issues that may arise.

## 2022-03-29 RX ORDER — OMEPRAZOLE 20 MG/1
20 CAPSULE, DELAYED RELEASE ORAL DAILY
Qty: 30 CAPSULE | Refills: 1 | Status: SHIPPED | OUTPATIENT
Start: 2022-03-29 | End: 2022-05-11 | Stop reason: SDUPTHER

## 2022-04-01 ENCOUNTER — LAB (OUTPATIENT)
Dept: LAB | Facility: HOSPITAL | Age: 67
End: 2022-04-01

## 2022-04-01 DIAGNOSIS — R53.83 FATIGUE, UNSPECIFIED TYPE: ICD-10-CM

## 2022-04-01 DIAGNOSIS — R73.09 ELEVATED GLUCOSE: ICD-10-CM

## 2022-04-01 DIAGNOSIS — I10 ESSENTIAL (PRIMARY) HYPERTENSION: ICD-10-CM

## 2022-04-01 DIAGNOSIS — Z12.5 PROSTATE CANCER SCREENING: ICD-10-CM

## 2022-04-01 DIAGNOSIS — E55.9 VITAMIN D DEFICIENCY: ICD-10-CM

## 2022-04-01 LAB
25(OH)D3 SERPL-MCNC: 22 NG/ML (ref 30–100)
ALBUMIN SERPL-MCNC: 4.5 G/DL (ref 3.5–5.2)
ALBUMIN UR-MCNC: <1.2 MG/DL
ALBUMIN/GLOB SERPL: 1.9 G/DL
ALP SERPL-CCNC: 135 U/L (ref 39–117)
ALT SERPL W P-5'-P-CCNC: 24 U/L (ref 1–41)
ANION GAP SERPL CALCULATED.3IONS-SCNC: 10.7 MMOL/L (ref 5–15)
AST SERPL-CCNC: 24 U/L (ref 1–40)
BASOPHILS # BLD AUTO: 0.05 10*3/MM3 (ref 0–0.2)
BASOPHILS NFR BLD AUTO: 0.6 % (ref 0–1.5)
BILIRUB SERPL-MCNC: 0.5 MG/DL (ref 0–1.2)
BILIRUB UR QL STRIP: NEGATIVE
BUN SERPL-MCNC: 13 MG/DL (ref 8–23)
BUN/CREAT SERPL: 11.8 (ref 7–25)
CALCIUM SPEC-SCNC: 9.3 MG/DL (ref 8.6–10.5)
CHLORIDE SERPL-SCNC: 106 MMOL/L (ref 98–107)
CHOLEST SERPL-MCNC: 188 MG/DL (ref 0–200)
CLARITY UR: CLEAR
CO2 SERPL-SCNC: 25.3 MMOL/L (ref 22–29)
COLOR UR: YELLOW
CREAT SERPL-MCNC: 1.1 MG/DL (ref 0.76–1.27)
CREAT UR-MCNC: 66.5 MG/DL
DEPRECATED RDW RBC AUTO: 38.7 FL (ref 37–54)
EGFRCR SERPLBLD CKD-EPI 2021: 75 ML/MIN/1.73
EOSINOPHIL # BLD AUTO: 0.28 10*3/MM3 (ref 0–0.4)
EOSINOPHIL NFR BLD AUTO: 3.3 % (ref 0.3–6.2)
ERYTHROCYTE [DISTWIDTH] IN BLOOD BY AUTOMATED COUNT: 11.9 % (ref 12.3–15.4)
FOLATE SERPL-MCNC: 9.43 NG/ML (ref 4.78–24.2)
GLOBULIN UR ELPH-MCNC: 2.4 GM/DL
GLUCOSE SERPL-MCNC: 93 MG/DL (ref 65–99)
GLUCOSE UR STRIP-MCNC: NEGATIVE MG/DL
HBA1C MFR BLD: 5.8 % (ref 4.8–5.6)
HCT VFR BLD AUTO: 42 % (ref 37.5–51)
HDLC SERPL-MCNC: 44 MG/DL (ref 40–60)
HGB BLD-MCNC: 14.9 G/DL (ref 13–17.7)
HGB UR QL STRIP.AUTO: NEGATIVE
IMM GRANULOCYTES # BLD AUTO: 0.05 10*3/MM3 (ref 0–0.05)
IMM GRANULOCYTES NFR BLD AUTO: 0.6 % (ref 0–0.5)
KETONES UR QL STRIP: NEGATIVE
LDLC SERPL CALC-MCNC: 128 MG/DL (ref 0–100)
LDLC/HDLC SERPL: 2.88 {RATIO}
LEUKOCYTE ESTERASE UR QL STRIP.AUTO: NEGATIVE
LYMPHOCYTES # BLD AUTO: 1.81 10*3/MM3 (ref 0.7–3.1)
LYMPHOCYTES NFR BLD AUTO: 21.2 % (ref 19.6–45.3)
MCH RBC QN AUTO: 31.8 PG (ref 26.6–33)
MCHC RBC AUTO-ENTMCNC: 35.5 G/DL (ref 31.5–35.7)
MCV RBC AUTO: 89.7 FL (ref 79–97)
MICROALBUMIN/CREAT UR: NORMAL MG/G{CREAT}
MONOCYTES # BLD AUTO: 0.86 10*3/MM3 (ref 0.1–0.9)
MONOCYTES NFR BLD AUTO: 10.1 % (ref 5–12)
NEUTROPHILS NFR BLD AUTO: 5.48 10*3/MM3 (ref 1.7–7)
NEUTROPHILS NFR BLD AUTO: 64.2 % (ref 42.7–76)
NITRITE UR QL STRIP: NEGATIVE
NRBC BLD AUTO-RTO: 0 /100 WBC (ref 0–0.2)
PH UR STRIP.AUTO: 6 [PH] (ref 5–8)
PLATELET # BLD AUTO: 212 10*3/MM3 (ref 140–450)
PMV BLD AUTO: 11.4 FL (ref 6–12)
POTASSIUM SERPL-SCNC: 4.2 MMOL/L (ref 3.5–5.2)
PROT SERPL-MCNC: 6.9 G/DL (ref 6–8.5)
PROT UR QL STRIP: NEGATIVE
PSA SERPL-MCNC: 0.89 NG/ML (ref 0–4)
RBC # BLD AUTO: 4.68 10*6/MM3 (ref 4.14–5.8)
SODIUM SERPL-SCNC: 142 MMOL/L (ref 136–145)
SP GR UR STRIP: 1.01 (ref 1–1.03)
T4 FREE SERPL-MCNC: 1.11 NG/DL (ref 0.93–1.7)
TRIGL SERPL-MCNC: 86 MG/DL (ref 0–150)
TSH SERPL DL<=0.05 MIU/L-ACNC: 2.22 UIU/ML (ref 0.27–4.2)
UROBILINOGEN UR QL STRIP: NORMAL
VIT B12 BLD-MCNC: 326 PG/ML (ref 211–946)
VLDLC SERPL-MCNC: 16 MG/DL (ref 5–40)
WBC NRBC COR # BLD: 8.53 10*3/MM3 (ref 3.4–10.8)

## 2022-04-01 PROCEDURE — G0103 PSA SCREENING: HCPCS

## 2022-04-01 PROCEDURE — 80061 LIPID PANEL: CPT

## 2022-04-01 PROCEDURE — 83036 HEMOGLOBIN GLYCOSYLATED A1C: CPT

## 2022-04-01 PROCEDURE — 85025 COMPLETE CBC W/AUTO DIFF WBC: CPT

## 2022-04-01 PROCEDURE — 80053 COMPREHEN METABOLIC PANEL: CPT

## 2022-04-01 PROCEDURE — 82306 VITAMIN D 25 HYDROXY: CPT

## 2022-04-01 PROCEDURE — 82607 VITAMIN B-12: CPT

## 2022-04-01 PROCEDURE — 84443 ASSAY THYROID STIM HORMONE: CPT

## 2022-04-01 PROCEDURE — 81003 URINALYSIS AUTO W/O SCOPE: CPT

## 2022-04-01 PROCEDURE — 84439 ASSAY OF FREE THYROXINE: CPT

## 2022-04-01 PROCEDURE — 82746 ASSAY OF FOLIC ACID SERUM: CPT

## 2022-04-01 PROCEDURE — 82570 ASSAY OF URINE CREATININE: CPT

## 2022-04-01 PROCEDURE — 36415 COLL VENOUS BLD VENIPUNCTURE: CPT

## 2022-04-01 PROCEDURE — 82043 UR ALBUMIN QUANTITATIVE: CPT

## 2022-05-11 ENCOUNTER — OFFICE VISIT (OUTPATIENT)
Dept: FAMILY MEDICINE CLINIC | Facility: CLINIC | Age: 67
End: 2022-05-11

## 2022-05-11 VITALS
WEIGHT: 171.5 LBS | HEART RATE: 77 BPM | OXYGEN SATURATION: 96 % | HEIGHT: 61 IN | RESPIRATION RATE: 18 BRPM | SYSTOLIC BLOOD PRESSURE: 150 MMHG | BODY MASS INDEX: 32.38 KG/M2 | TEMPERATURE: 97.8 F | DIASTOLIC BLOOD PRESSURE: 80 MMHG

## 2022-05-11 DIAGNOSIS — R03.0 ELEVATED BLOOD PRESSURE READING: ICD-10-CM

## 2022-05-11 DIAGNOSIS — I10 PRIMARY HYPERTENSION: ICD-10-CM

## 2022-05-11 DIAGNOSIS — Z12.2 SCREENING FOR LUNG CANCER: ICD-10-CM

## 2022-05-11 DIAGNOSIS — Z87.891 FORMER SMOKER: ICD-10-CM

## 2022-05-11 DIAGNOSIS — R91.1 PULMONARY NODULE: ICD-10-CM

## 2022-05-11 DIAGNOSIS — J43.9 PULMONARY EMPHYSEMA, UNSPECIFIED EMPHYSEMA TYPE: ICD-10-CM

## 2022-05-11 DIAGNOSIS — K21.9 GASTROESOPHAGEAL REFLUX DISEASE WITHOUT ESOPHAGITIS: ICD-10-CM

## 2022-05-11 DIAGNOSIS — R06.2 WHEEZING: ICD-10-CM

## 2022-05-11 DIAGNOSIS — Z76.89 ENCOUNTER TO ESTABLISH CARE: Primary | ICD-10-CM

## 2022-05-11 PROCEDURE — 99214 OFFICE O/P EST MOD 30 MIN: CPT | Performed by: STUDENT IN AN ORGANIZED HEALTH CARE EDUCATION/TRAINING PROGRAM

## 2022-05-11 RX ORDER — ALBUTEROL SULFATE 90 UG/1
AEROSOL, METERED RESPIRATORY (INHALATION) EVERY 12 HOURS SCHEDULED
COMMUNITY
End: 2022-07-03

## 2022-05-11 RX ORDER — OMEPRAZOLE 20 MG/1
20 CAPSULE, DELAYED RELEASE ORAL DAILY
Qty: 90 CAPSULE | Refills: 1 | Status: SHIPPED | OUTPATIENT
Start: 2022-05-11 | End: 2022-08-10 | Stop reason: SDUPTHER

## 2022-05-11 RX ORDER — PREDNISONE 20 MG/1
40 TABLET ORAL DAILY
Qty: 10 TABLET | Refills: 0 | Status: SHIPPED | OUTPATIENT
Start: 2022-05-11 | End: 2022-07-03

## 2022-05-11 RX ORDER — FLUTICASONE PROPIONATE AND SALMETEROL 250; 50 UG/1; UG/1
1 POWDER RESPIRATORY (INHALATION)
COMMUNITY
End: 2022-07-13

## 2022-05-11 RX ORDER — BUDESONIDE, GLYCOPYRROLATE, AND FORMOTEROL FUMARATE 160; 9; 4.8 UG/1; UG/1; UG/1
2 AEROSOL, METERED RESPIRATORY (INHALATION) 2 TIMES DAILY
Qty: 10.7 G | Refills: 1 | Status: SHIPPED | OUTPATIENT
Start: 2022-05-11 | End: 2022-07-03

## 2022-05-11 NOTE — PROGRESS NOTES
"Chief Complaint  Establishing care with new provider for management of COPD/hypertension/pulmonary nodule/GERD    Subjective         Jonathan Guerra is a 66 y.o. male who presents to [facility]  66 years old male comes to the clinic today to establish care with new provider in the clinic.    COPD; uncontrolled, patient is using Advair/Combivent.  Reports wheezing and heavy breathing on exertion which is chronic for him.    History of pulmonary nodule; patient is following up with pulmonary.  Last CT was done about 2 years ago.    Former smoker    Hypertension; compliant with medications, does not check his blood pressure at home.    Denies any chest pain or shortness of breath at rest.  Physically active.      Review of Systems   Objective   Vital Signs:   Vitals:    05/11/22 1327   BP: 150/80   Pulse: 77   Resp: 18   Temp: 97.8 °F (36.6 °C)   SpO2: 96%   Weight: 77.8 kg (171 lb 8 oz)   Height: 154.9 cm (61\")      Body mass index is 32.4 kg/m².   Physical Exam  Vitals reviewed.   Constitutional:       Appearance: Normal appearance. He is well-developed.   HENT:      Head: Normocephalic and atraumatic.      Right Ear: External ear normal.      Left Ear: External ear normal.      Mouth/Throat:      Pharynx: No oropharyngeal exudate.   Eyes:      Conjunctiva/sclera: Conjunctivae normal.      Pupils: Pupils are equal, round, and reactive to light.   Cardiovascular:      Rate and Rhythm: Normal rate and regular rhythm.      Heart sounds: No murmur heard.    No friction rub. No gallop.   Pulmonary:      Effort: Pulmonary effort is normal.      Breath sounds: Examination of the right-lower field reveals wheezing. Examination of the left-lower field reveals wheezing. Wheezing present. No rhonchi.   Abdominal:      General: Bowel sounds are normal. There is no distension.      Palpations: Abdomen is soft.      Tenderness: There is no abdominal tenderness.   Skin:     General: Skin is " warm and dry.   Neurological:      Mental Status: He is alert and oriented to person, place, and time.      Cranial Nerves: No cranial nerve deficit.   Psychiatric:         Mood and Affect: Mood and affect normal.         Behavior: Behavior normal.         Thought Content: Thought content normal.         Judgment: Judgment normal.                       Assessment and Plan   Diagnoses and all orders for this visit:    1. Encounter to establish care (Primary)    2. Primary hypertension  Comments:  Elevated blood pressure today, home blood pressure logs discussed, continue with lisinopril and hydrochlorothiazide for now    3. Pulmonary emphysema, unspecified emphysema type (HCC)  Comments:  Uncontrolled symptoms, patient is taking Advair and Combivent with no significant improvement.  We will start patient on Breztri  Orders:  -     Budeson-Glycopyrrol-Formoterol (Breztri Aerosphere) 160-9-4.8 MCG/ACT aerosol inhaler; Inhale 2 puffs 2 (Two) Times a Day. Symptoms not controlled with Advair and Combivent  Dispense: 10.7 g; Refill: 1  -     predniSONE (DELTASONE) 20 MG tablet; Take 2 tablets by mouth Daily.  Dispense: 10 tablet; Refill: 0    4. Elevated blood pressure reading    5. Pulmonary nodule  Comments:  Noncompliant with pulmonary currently, agrees to get CT scan.  Orders:  -      CT Chest Low Dose Cancer Screening WO; Future    6. Former smoker  -      CT Chest Low Dose Cancer Screening WO; Future    7. Screening for lung cancer  -      CT Chest Low Dose Cancer Screening WO; Future    8. Gastroesophageal reflux disease without esophagitis  Comments:  Controlled on PPI, diet modifications discussed.  Orders:  -     omeprazole (priLOSEC) 20 MG capsule; Take 1 capsule by mouth Daily.  Dispense: 90 capsule; Refill: 1    9. Wheezing  Comments:  Likely uncontrolled COPD.  We will start patient on Breztri  Orders:  -     predniSONE (DELTASONE) 20 MG tablet; Take 2 tablets by mouth Daily.  Dispense: 10 tablet; Refill:  0        Blood work from last month reviewed which was ordered by another provider.  Follow Up   Return in about 6 months (around 11/11/2022) for Recheck, Next scheduled follow up.  Patient was given instructions and counseling regarding his condition or for health maintenance advice. Please see specific information pulled into the AVS if appropriate.

## 2022-06-01 ENCOUNTER — HOSPITAL ENCOUNTER (OUTPATIENT)
Dept: CT IMAGING | Facility: HOSPITAL | Age: 67
Discharge: HOME | End: 2022-06-01
Admitting: STUDENT IN AN ORGANIZED HEALTH CARE EDUCATION/TRAINING PROGRAM

## 2022-06-01 DIAGNOSIS — Z12.2 SCREENING FOR LUNG CANCER: ICD-10-CM

## 2022-06-01 DIAGNOSIS — J43.9 PULMONARY EMPHYSEMA, UNSPECIFIED EMPHYSEMA TYPE: Primary | ICD-10-CM

## 2022-06-01 DIAGNOSIS — Z87.891 FORMER SMOKER: ICD-10-CM

## 2022-06-01 DIAGNOSIS — R91.1 PULMONARY NODULE: ICD-10-CM

## 2022-06-01 PROCEDURE — 71271 CT THORAX LUNG CANCER SCR C-: CPT

## 2022-07-03 ENCOUNTER — HOSPITAL ENCOUNTER (OUTPATIENT)
Facility: HOSPITAL | Age: 67
Setting detail: OBSERVATION
Discharge: HOME | End: 2022-07-04
Attending: EMERGENCY MEDICINE | Admitting: INTERNAL MEDICINE

## 2022-07-03 ENCOUNTER — APPOINTMENT (OUTPATIENT)
Dept: GENERAL RADIOLOGY | Facility: HOSPITAL | Age: 67
End: 2022-07-03

## 2022-07-03 DIAGNOSIS — R07.9 CHEST PAIN, UNSPECIFIED TYPE: Primary | ICD-10-CM

## 2022-07-03 PROBLEM — R30.0 DYSURIA: Status: ACTIVE | Noted: 2022-07-03

## 2022-07-03 PROBLEM — N17.9 ACUTE RENAL FAILURE (ARF) (HCC): Status: ACTIVE | Noted: 2022-07-03

## 2022-07-03 LAB
ALBUMIN SERPL-MCNC: 4.3 G/DL (ref 3.5–5.2)
ALBUMIN/GLOB SERPL: 1.6 G/DL
ALP SERPL-CCNC: 134 U/L (ref 39–117)
ALT SERPL W P-5'-P-CCNC: 22 U/L (ref 1–41)
ANION GAP SERPL CALCULATED.3IONS-SCNC: 12.6 MMOL/L (ref 5–15)
AST SERPL-CCNC: 21 U/L (ref 1–40)
BASOPHILS # BLD AUTO: 0.06 10*3/MM3 (ref 0–0.2)
BASOPHILS NFR BLD AUTO: 0.5 % (ref 0–1.5)
BILIRUB SERPL-MCNC: 0.8 MG/DL (ref 0–1.2)
BILIRUB UR QL STRIP: NEGATIVE
BUN SERPL-MCNC: 17 MG/DL (ref 8–23)
BUN/CREAT SERPL: 10.3 (ref 7–25)
CALCIUM SPEC-SCNC: 9.6 MG/DL (ref 8.6–10.5)
CHLORIDE SERPL-SCNC: 103 MMOL/L (ref 98–107)
CK MB SERPL-CCNC: 2.62 NG/ML
CK SERPL-CCNC: 105 U/L (ref 20–200)
CLARITY UR: CLEAR
CO2 SERPL-SCNC: 23.4 MMOL/L (ref 22–29)
COLOR UR: YELLOW
CREAT SERPL-MCNC: 1.65 MG/DL (ref 0.76–1.27)
DEPRECATED RDW RBC AUTO: 43.3 FL (ref 37–54)
EGFRCR SERPLBLD CKD-EPI 2021: 46.1 ML/MIN/1.73
EOSINOPHIL # BLD AUTO: 0.47 10*3/MM3 (ref 0–0.4)
EOSINOPHIL NFR BLD AUTO: 4.2 % (ref 0.3–6.2)
ERYTHROCYTE [DISTWIDTH] IN BLOOD BY AUTOMATED COUNT: 12.5 % (ref 12.3–15.4)
GLOBULIN UR ELPH-MCNC: 2.7 GM/DL
GLUCOSE SERPL-MCNC: 133 MG/DL (ref 65–99)
GLUCOSE UR STRIP-MCNC: ABNORMAL MG/DL
HCT VFR BLD AUTO: 47 % (ref 37.5–51)
HGB BLD-MCNC: 15.5 G/DL (ref 13–17.7)
HGB UR QL STRIP.AUTO: NEGATIVE
HOLD SPECIMEN: NORMAL
IMM GRANULOCYTES # BLD AUTO: 0.06 10*3/MM3 (ref 0–0.05)
IMM GRANULOCYTES NFR BLD AUTO: 0.5 % (ref 0–0.5)
KETONES UR QL STRIP: NEGATIVE
LEUKOCYTE ESTERASE UR QL STRIP.AUTO: NEGATIVE
LIPASE SERPL-CCNC: 26 U/L (ref 13–60)
LYMPHOCYTES # BLD AUTO: 1.81 10*3/MM3 (ref 0.7–3.1)
LYMPHOCYTES NFR BLD AUTO: 16.2 % (ref 19.6–45.3)
MAGNESIUM SERPL-MCNC: 2.2 MG/DL (ref 1.6–2.4)
MCH RBC QN AUTO: 31.2 PG (ref 26.6–33)
MCHC RBC AUTO-ENTMCNC: 33 G/DL (ref 31.5–35.7)
MCV RBC AUTO: 94.6 FL (ref 79–97)
MONOCYTES # BLD AUTO: 0.82 10*3/MM3 (ref 0.1–0.9)
MONOCYTES NFR BLD AUTO: 7.3 % (ref 5–12)
NEUTROPHILS NFR BLD AUTO: 7.94 10*3/MM3 (ref 1.7–7)
NEUTROPHILS NFR BLD AUTO: 71.3 % (ref 42.7–76)
NITRITE UR QL STRIP: NEGATIVE
NRBC BLD AUTO-RTO: 0 /100 WBC (ref 0–0.2)
NT-PROBNP SERPL-MCNC: 229.1 PG/ML (ref 0–900)
PH UR STRIP.AUTO: 6.5 [PH] (ref 5–8)
PLATELET # BLD AUTO: 207 10*3/MM3 (ref 140–450)
PMV BLD AUTO: 11.5 FL (ref 6–12)
POTASSIUM SERPL-SCNC: 3.6 MMOL/L (ref 3.5–5.2)
PROT SERPL-MCNC: 7 G/DL (ref 6–8.5)
PROT UR QL STRIP: NEGATIVE
QT INTERVAL: 349 MS
QT INTERVAL: 378 MS
RBC # BLD AUTO: 4.97 10*6/MM3 (ref 4.14–5.8)
SODIUM SERPL-SCNC: 139 MMOL/L (ref 136–145)
SP GR UR STRIP: 1.01 (ref 1–1.03)
TROPONIN I SERPL-MCNC: 0.01 NG/ML (ref 0–0.6)
TROPONIN T SERPL-MCNC: <0.01 NG/ML (ref 0–0.03)
UROBILINOGEN UR QL STRIP: ABNORMAL
WBC NRBC COR # BLD: 11.16 10*3/MM3 (ref 3.4–10.8)
WHOLE BLOOD HOLD COAG: NORMAL
WHOLE BLOOD HOLD SPECIMEN: NORMAL

## 2022-07-03 PROCEDURE — 96366 THER/PROPH/DIAG IV INF ADDON: CPT

## 2022-07-03 PROCEDURE — 81003 URINALYSIS AUTO W/O SCOPE: CPT | Performed by: HOSPITALIST

## 2022-07-03 PROCEDURE — 83880 ASSAY OF NATRIURETIC PEPTIDE: CPT | Performed by: EMERGENCY MEDICINE

## 2022-07-03 PROCEDURE — G0378 HOSPITAL OBSERVATION PER HR: HCPCS

## 2022-07-03 PROCEDURE — 84484 ASSAY OF TROPONIN QUANT: CPT | Performed by: HOSPITALIST

## 2022-07-03 PROCEDURE — 82550 ASSAY OF CK (CPK): CPT | Performed by: EMERGENCY MEDICINE

## 2022-07-03 PROCEDURE — 83690 ASSAY OF LIPASE: CPT | Performed by: EMERGENCY MEDICINE

## 2022-07-03 PROCEDURE — 82553 CREATINE MB FRACTION: CPT | Performed by: EMERGENCY MEDICINE

## 2022-07-03 PROCEDURE — 85025 COMPLETE CBC W/AUTO DIFF WBC: CPT | Performed by: EMERGENCY MEDICINE

## 2022-07-03 PROCEDURE — 80053 COMPREHEN METABOLIC PANEL: CPT | Performed by: EMERGENCY MEDICINE

## 2022-07-03 PROCEDURE — 99220 PR INITIAL OBSERVATION CARE/DAY 70 MINUTES: CPT | Performed by: HOSPITALIST

## 2022-07-03 PROCEDURE — 63710000001 PREDNISONE PER 1 MG: Performed by: HOSPITALIST

## 2022-07-03 PROCEDURE — 36415 COLL VENOUS BLD VENIPUNCTURE: CPT | Performed by: EMERGENCY MEDICINE

## 2022-07-03 PROCEDURE — 96360 HYDRATION IV INFUSION INIT: CPT

## 2022-07-03 PROCEDURE — 71045 X-RAY EXAM CHEST 1 VIEW: CPT

## 2022-07-03 PROCEDURE — 96361 HYDRATE IV INFUSION ADD-ON: CPT

## 2022-07-03 PROCEDURE — 94799 UNLISTED PULMONARY SVC/PX: CPT

## 2022-07-03 PROCEDURE — 93005 ELECTROCARDIOGRAM TRACING: CPT | Performed by: EMERGENCY MEDICINE

## 2022-07-03 PROCEDURE — 93005 ELECTROCARDIOGRAM TRACING: CPT | Performed by: HOSPITALIST

## 2022-07-03 PROCEDURE — 94640 AIRWAY INHALATION TREATMENT: CPT

## 2022-07-03 PROCEDURE — 25010000002 ENOXAPARIN PER 10 MG: Performed by: HOSPITALIST

## 2022-07-03 PROCEDURE — 84484 ASSAY OF TROPONIN QUANT: CPT

## 2022-07-03 PROCEDURE — 96372 THER/PROPH/DIAG INJ SC/IM: CPT

## 2022-07-03 PROCEDURE — 99284 EMERGENCY DEPT VISIT MOD MDM: CPT

## 2022-07-03 PROCEDURE — 83735 ASSAY OF MAGNESIUM: CPT | Performed by: EMERGENCY MEDICINE

## 2022-07-03 RX ORDER — HYDROCHLOROTHIAZIDE 12.5 MG/1
12.5 TABLET ORAL DAILY
Status: DISCONTINUED | OUTPATIENT
Start: 2022-07-03 | End: 2022-07-04 | Stop reason: HOSPADM

## 2022-07-03 RX ORDER — ALBUTEROL SULFATE 90 UG/1
2 AEROSOL, METERED RESPIRATORY (INHALATION) EVERY 12 HOURS SCHEDULED
Status: DISCONTINUED | OUTPATIENT
Start: 2022-07-03 | End: 2022-07-04 | Stop reason: HOSPADM

## 2022-07-03 RX ORDER — ENOXAPARIN SODIUM 100 MG/ML
40 INJECTION SUBCUTANEOUS EVERY 24 HOURS
Status: DISCONTINUED | OUTPATIENT
Start: 2022-07-03 | End: 2022-07-04 | Stop reason: HOSPADM

## 2022-07-03 RX ORDER — ONDANSETRON 2 MG/ML
4 INJECTION INTRAMUSCULAR; INTRAVENOUS EVERY 6 HOURS PRN
Status: DISCONTINUED | OUTPATIENT
Start: 2022-07-03 | End: 2022-07-04 | Stop reason: HOSPADM

## 2022-07-03 RX ORDER — CETIRIZINE HYDROCHLORIDE 10 MG/1
10 TABLET ORAL DAILY
Status: DISCONTINUED | OUTPATIENT
Start: 2022-07-03 | End: 2022-07-04 | Stop reason: HOSPADM

## 2022-07-03 RX ORDER — ASPIRIN 81 MG/1
324 TABLET, CHEWABLE ORAL ONCE
Status: COMPLETED | OUTPATIENT
Start: 2022-07-03 | End: 2022-07-03

## 2022-07-03 RX ORDER — SODIUM CHLORIDE 0.9 % (FLUSH) 0.9 %
10 SYRINGE (ML) INJECTION AS NEEDED
Status: DISCONTINUED | OUTPATIENT
Start: 2022-07-03 | End: 2022-07-04 | Stop reason: HOSPADM

## 2022-07-03 RX ORDER — CHOLECALCIFEROL (VITAMIN D3) 125 MCG
5 CAPSULE ORAL NIGHTLY PRN
Status: DISCONTINUED | OUTPATIENT
Start: 2022-07-03 | End: 2022-07-04 | Stop reason: HOSPADM

## 2022-07-03 RX ORDER — SODIUM CHLORIDE 9 MG/ML
100 INJECTION, SOLUTION INTRAVENOUS CONTINUOUS
Status: ACTIVE | OUTPATIENT
Start: 2022-07-03 | End: 2022-07-04

## 2022-07-03 RX ORDER — SODIUM CHLORIDE 0.9 % (FLUSH) 0.9 %
10 SYRINGE (ML) INJECTION EVERY 12 HOURS SCHEDULED
Status: DISCONTINUED | OUTPATIENT
Start: 2022-07-03 | End: 2022-07-04 | Stop reason: HOSPADM

## 2022-07-03 RX ORDER — ASPIRIN 81 MG/1
81 TABLET, CHEWABLE ORAL ONCE
Status: DISCONTINUED | OUTPATIENT
Start: 2022-07-03 | End: 2022-07-03 | Stop reason: SDUPTHER

## 2022-07-03 RX ORDER — METOPROLOL SUCCINATE 25 MG/1
25 TABLET, EXTENDED RELEASE ORAL EVERY 12 HOURS SCHEDULED
Status: DISCONTINUED | OUTPATIENT
Start: 2022-07-03 | End: 2022-07-04 | Stop reason: HOSPADM

## 2022-07-03 RX ORDER — AMOXICILLIN 250 MG
2 CAPSULE ORAL 2 TIMES DAILY
Status: DISCONTINUED | OUTPATIENT
Start: 2022-07-03 | End: 2022-07-04 | Stop reason: HOSPADM

## 2022-07-03 RX ORDER — ASPIRIN 81 MG/1
81 TABLET ORAL DAILY
Status: DISCONTINUED | OUTPATIENT
Start: 2022-07-04 | End: 2022-07-04 | Stop reason: HOSPADM

## 2022-07-03 RX ORDER — POLYETHYLENE GLYCOL 3350 17 G/17G
17 POWDER, FOR SOLUTION ORAL DAILY PRN
Status: DISCONTINUED | OUTPATIENT
Start: 2022-07-03 | End: 2022-07-04 | Stop reason: HOSPADM

## 2022-07-03 RX ORDER — ONDANSETRON 4 MG/1
4 TABLET, FILM COATED ORAL EVERY 6 HOURS PRN
Status: DISCONTINUED | OUTPATIENT
Start: 2022-07-03 | End: 2022-07-04 | Stop reason: HOSPADM

## 2022-07-03 RX ORDER — BISACODYL 5 MG/1
5 TABLET, DELAYED RELEASE ORAL DAILY PRN
Status: DISCONTINUED | OUTPATIENT
Start: 2022-07-03 | End: 2022-07-04 | Stop reason: HOSPADM

## 2022-07-03 RX ORDER — LISINOPRIL 20 MG/1
20 TABLET ORAL DAILY
Status: DISCONTINUED | OUTPATIENT
Start: 2022-07-03 | End: 2022-07-04 | Stop reason: HOSPADM

## 2022-07-03 RX ORDER — BISACODYL 10 MG
10 SUPPOSITORY, RECTAL RECTAL DAILY PRN
Status: DISCONTINUED | OUTPATIENT
Start: 2022-07-03 | End: 2022-07-04 | Stop reason: HOSPADM

## 2022-07-03 RX ORDER — LISINOPRIL 10 MG/1
10 TABLET ORAL DAILY
COMMUNITY
End: 2022-07-13

## 2022-07-03 RX ORDER — NITROGLYCERIN 0.4 MG/1
0.4 TABLET SUBLINGUAL
Status: DISCONTINUED | OUTPATIENT
Start: 2022-07-03 | End: 2022-07-04 | Stop reason: HOSPADM

## 2022-07-03 RX ORDER — ATORVASTATIN CALCIUM 40 MG/1
40 TABLET, FILM COATED ORAL NIGHTLY
Status: DISCONTINUED | OUTPATIENT
Start: 2022-07-03 | End: 2022-07-04

## 2022-07-03 RX ORDER — IPRATROPIUM BROMIDE AND ALBUTEROL SULFATE 2.5; .5 MG/3ML; MG/3ML
3 SOLUTION RESPIRATORY (INHALATION)
Status: DISCONTINUED | OUTPATIENT
Start: 2022-07-03 | End: 2022-07-04 | Stop reason: HOSPADM

## 2022-07-03 RX ORDER — PANTOPRAZOLE SODIUM 40 MG/1
40 TABLET, DELAYED RELEASE ORAL EVERY MORNING
Status: DISCONTINUED | OUTPATIENT
Start: 2022-07-04 | End: 2022-07-04 | Stop reason: HOSPADM

## 2022-07-03 RX ORDER — PREDNISONE 20 MG/1
40 TABLET ORAL DAILY
Status: DISCONTINUED | OUTPATIENT
Start: 2022-07-03 | End: 2022-07-04 | Stop reason: HOSPADM

## 2022-07-03 RX ORDER — ACETAMINOPHEN 325 MG/1
650 TABLET ORAL EVERY 4 HOURS PRN
Status: DISCONTINUED | OUTPATIENT
Start: 2022-07-03 | End: 2022-07-04 | Stop reason: HOSPADM

## 2022-07-03 RX ADMIN — PREDNISONE 40 MG: 20 TABLET ORAL at 18:10

## 2022-07-03 RX ADMIN — LISINOPRIL 20 MG: 20 TABLET ORAL at 18:11

## 2022-07-03 RX ADMIN — Medication 10 ML: at 20:05

## 2022-07-03 RX ADMIN — ASPIRIN 81 MG CHEWABLE TABLET 324 MG: 81 TABLET CHEWABLE at 13:46

## 2022-07-03 RX ADMIN — SODIUM CHLORIDE 100 ML/HR: 9 INJECTION, SOLUTION INTRAVENOUS at 20:54

## 2022-07-03 RX ADMIN — ATORVASTATIN CALCIUM 40 MG: 40 TABLET, FILM COATED ORAL at 20:04

## 2022-07-03 RX ADMIN — METOPROLOL SUCCINATE 25 MG: 25 TABLET, EXTENDED RELEASE ORAL at 20:04

## 2022-07-03 RX ADMIN — ARFORMOTEROL TARTRATE: 15 SOLUTION RESPIRATORY (INHALATION) at 18:50

## 2022-07-03 RX ADMIN — IPRATROPIUM BROMIDE AND ALBUTEROL SULFATE 3 ML: .5; 3 SOLUTION RESPIRATORY (INHALATION) at 18:50

## 2022-07-03 RX ADMIN — CETIRIZINE HYDROCHLORIDE 10 MG: 10 TABLET, FILM COATED ORAL at 18:10

## 2022-07-03 RX ADMIN — ENOXAPARIN SODIUM 40 MG: 100 INJECTION SUBCUTANEOUS at 18:11

## 2022-07-03 RX ADMIN — HYDROCHLOROTHIAZIDE 12.5 MG: 12.5 CAPSULE ORAL at 18:10

## 2022-07-03 NOTE — ED PROVIDER NOTES
Time: 2:03 PM EDT  Arrived by: private car  Chief Complaint: chest pain  History provided by: patient  History is limited by: N/A     History of Present Illness:  Patient is a 66 y.o. year old male who presents to the emergency department with chest pain with onset 2 days ago. He states that the pain was the worst last night. He states that the pain is on center of the chest and that he also sometimes has SOB. Currently, he does not have any chest pain. He has a history of HTN, COPD and emphysema. His family has a history of heart disease. He states that the pain is not worse with exertion. Pt took aspirin prior to ED arrival.      Chest Pain  Pain location:  Substernal area  Pain radiates to:  Does not radiate  Pain severity now: currently no pain.  Onset quality:  Gradual  Timing:  Intermittent  Chronicity:  New  Relieved by:  Aspirin  Worsened by:  Nothing  Associated symptoms: shortness of breath    Associated symptoms: no abdominal pain, no cough, no fever, no headache, no nausea, no palpitations and no vomiting              Patient Care Team  Primary Care Provider: Casey Mosley MD    Past Medical History:     Allergies   Allergen Reactions   • Azithromycin Unknown - High Severity   • Codeine Unknown - High Severity   • Penicillins Unknown - High Severity     Past Medical History:   Diagnosis Date   • Acid reflux    • Acid reflux disease    • Anemia, unspecified    • Anxiety    • Arthritis    • Asthma    • Broken bones    • Calcaneus fracture, left    • Chronic bronchitis (HCC)    • COPD (chronic obstructive pulmonary disease) ([facility])    • Depression    • Diverticulitis    • Emphysema lung (HCC)    • HBP (high blood pressure)    • Head injury     skull fracture    • Hernia cerebri ([facility])    • HTN (hypertension)    • Hyperlipemia    • Lung disease      Past Surgical History:   Procedure Laterality Date   • COLONOSCOPY  2014, 2019   • ENDOSCOPY       Family History   Problem Relation Age of Onset   • Other Mother          Thyroid Gland Neoplasm   • Heart disease Mother    • Colon cancer Mother    • Diabetes Sister    • Colon cancer Paternal Grandmother        Home Medications:  Prior to Admission medications    Medication Sig Start Date End Date Taking? Authorizing Provider   albuterol sulfate  (90 Base) MCG/ACT inhaler Every 12 (Twelve) Hours.    Dillon Mart MD   Budeson-Glycopyrrol-Formoterol (Breztri Aerosphere) 160-9-4.8 MCG/ACT aerosol inhaler Inhale 2 puffs 2 (Two) Times a Day. Symptoms not controlled with Advair and Combivent 22   Casey Mosley MD   cetirizine (zyrTEC) 10 MG tablet TAKE ONE TABLET BY MOUTH ONCE DAILY 22   Jin Green MD   Combivent Respimat  MCG/ACT inhaler INHALE 1 PUFF BY MOUTH BY MOUTH 4 TIMES DAILY. MAY USE 1 EXTRA PUFF IF NEEDED. DON'T EXCEED 6 PUFFS PER 24 HOURS. 22   Jin Green MD   Fluticasone-Salmeterol (ADVAIR) 250-50 MCG/ACT DISKUS Inhale 2 (Two) Times a Day.    Dillon Mart MD   fluticasone-salmeterol (ADVAIR) 250-50 MCG/DOSE DISKUS Inhale 1 puff 3 (Three) Times a Day.    Dillon Mart MD   hydroCHLOROthiazide (HYDRODIURIL) 12.5 MG tablet Take 12.5 mg by mouth Daily. 21   Dillon Mart MD   lisinopril (PRINIVIL,ZESTRIL) 20 MG tablet Take 1 tablet by mouth Daily. 21   Jin Green MD   metoprolol succinate XL (Toprol XL) 25 MG 24 hr tablet Take 1 tablet by mouth 2 (two) times a day. 21   Jin Green MD   omeprazole (priLOSEC) 20 MG capsule Take 1 capsule by mouth Daily. 22   Casey Mosley MD   predniSONE (DELTASONE) 20 MG tablet Take 2 tablets by mouth Daily. 22   Casey Mosley MD        Social History:   Social History     Tobacco Use   • Smoking status: Former Smoker     Packs/day: 1.50     Years: 40.00     Pack years: 60.00     Types: Cigarettes     Quit date:      Years since quittin.5   • Smokeless tobacco: Never Used   Substance Use Topics   • Alcohol use: Never     Comment:  "Ocassional    • Drug use: Never     Recent travel: no     Review of Systems:  Review of Systems   Constitutional: Negative for chills and fever.   HENT: Negative for congestion, rhinorrhea and sore throat.    Eyes: Negative for pain and visual disturbance.   Respiratory: Positive for shortness of breath. Negative for apnea, cough and chest tightness.    Cardiovascular: Positive for chest pain. Negative for palpitations.   Gastrointestinal: Negative for abdominal pain, diarrhea, nausea and vomiting.   Genitourinary: Negative for difficulty urinating and dysuria.   Musculoskeletal: Negative for joint swelling and myalgias.   Skin: Negative for color change.   Neurological: Negative for seizures and headaches.   Psychiatric/Behavioral: Negative.    All other systems reviewed and are negative.       Physical Exam:  /82   Pulse 68   Temp 98 °F (36.7 °C) (Oral)   Resp 24   Ht 157.5 cm (62\")   Wt 76.2 kg (167 lb 15.9 oz)   SpO2 96%   BMI 30.73 kg/m²     Physical Exam  Vitals and nursing note reviewed.   Constitutional:       General: He is not in acute distress.     Appearance: Normal appearance. He is not toxic-appearing.   HENT:      Head: Normocephalic and atraumatic.      Jaw: There is normal jaw occlusion.   Eyes:      General: Lids are normal.      Extraocular Movements: Extraocular movements intact.      Conjunctiva/sclera: Conjunctivae normal.      Pupils: Pupils are equal, round, and reactive to light.   Cardiovascular:      Rate and Rhythm: Normal rate and regular rhythm.      Pulses: Normal pulses.      Heart sounds: Normal heart sounds.   Pulmonary:      Effort: Pulmonary effort is normal. No respiratory distress.      Breath sounds: Normal breath sounds. No wheezing or rhonchi.   Abdominal:      General: Abdomen is flat.      Palpations: Abdomen is soft.      Tenderness: There is no abdominal tenderness. There is no guarding or rebound.   Musculoskeletal:         General: Normal range of motion. "      Cervical back: Normal range of motion and neck supple.      Right lower leg: No edema.      Left lower leg: No edema.   Skin:     General: Skin is warm and dry.   Neurological:      Mental Status: He is alert and oriented to person, place, and time. Mental status is at baseline.   Psychiatric:         Mood and Affect: Mood normal.                Medications in the Emergency Department:  Medications   sodium chloride 0.9 % flush 10 mL (has no administration in time range)   aspirin chewable tablet 324 mg (324 mg Oral Given 7/3/22 1346)        Labs  Lab Results (last 24 hours)     Procedure Component Value Units Date/Time    POC Troponin I with Hold Tube [[id number]] Collected: 07/03/22 1256    Specimen: Blood Updated: 07/03/22 1538    Narrative:      The following orders were created for panel order POC Troponin I with Hold Tube.  Procedure                               Abnormality         Status                     ---------                               -----------         ------                     POC Troponin I[[id number]]                                                              HOLD Troponin-I Tube[[id number]]                             Final result                 Please view results for these tests on the individual orders.    CBC & Differential [[id number]]  (Abnormal) Collected: 07/03/22 1256    Specimen: Blood Updated: 07/03/22 1428    Narrative:      The following orders were created for panel order CBC & Differential.  Procedure                               Abnormality         Status                     ---------                               -----------         ------                     CBC Auto Differential[[id number]]        Abnormal            Final result                 Please view results for these tests on the individual orders.    Comprehensive Metabolic Panel [[id number]]  (Abnormal) Collected: 07/03/22 1256    Specimen: Blood Updated: 07/03/22 1350     Glucose 133 mg/dL      BUN 17 mg/dL       Creatinine 1.65 mg/dL      Sodium 139 mmol/L      Potassium 3.6 mmol/L      Chloride 103 mmol/L      CO2 23.4 mmol/L      Calcium 9.6 mg/dL      Total Protein 7.0 g/dL      Albumin 4.30 g/dL      ALT (SGPT) 22 U/L      AST (SGOT) 21 U/L      Alkaline Phosphatase 134 U/L      Total Bilirubin 0.8 mg/dL      Globulin 2.7 gm/dL      A/G Ratio 1.6 g/dL      BUN/Creatinine Ratio 10.3     Anion Gap 12.6 mmol/L      eGFR 46.1 mL/min/1.73      Comment: National Kidney Foundation and American Society of Nephrology (ASN) Task Force recommended calculation based on the Chronic Kidney Disease Epidemiology Collaboration (CKD-EPI) equation refit without adjustment for race.       Narrative:      GFR Normal >60  Chronic Kidney Disease <60  Kidney Failure <15      Lipase [[id number]]  (Normal) Collected: 07/03/22 1256    Specimen: Blood Updated: 07/03/22 1350     Lipase 26 U/L     BNP [[id number]]  (Normal) Collected: 07/03/22 1256    Specimen: Blood Updated: 07/03/22 1346     proBNP 229.1 pg/mL     Narrative:      Among patients with dyspnea, NT-proBNP is highly sensitive for the detection of acute congestive heart failure. In addition NT-proBNP of <300 pg/ml effectively rules out acute congestive heart failure with 99% negative predictive value.    Results may be falsely decreased if patient taking Biotin.      Magnesium [[id number]]  (Normal) Collected: 07/03/22 1256    Specimen: Blood Updated: 07/03/22 1349     Magnesium 2.2 mg/dL     CK Total & CKMB [[id number]]  (Normal) Collected: 07/03/22 1256    Specimen: Blood Updated: 07/03/22 1350     CKMB 2.62 ng/mL      Creatine Kinase 105 U/L     Narrative:      CKMB results may be falsely decreased if patient taking Biotin.    CBC Auto Differential [[id number]]  (Abnormal) Collected: 07/03/22 1256    Specimen: Blood Updated: 07/03/22 1428     WBC 11.16 10*3/mm3      RBC 4.97 10*6/mm3      Hemoglobin 15.5 g/dL      Hematocrit 47.0 %      MCV 94.6 fL      MCH 31.2 pg      MCHC 33.0  g/dL      RDW 12.5 %      RDW-SD 43.3 fl      MPV 11.5 fL      Platelets 207 10*3/mm3      Neutrophil % 71.3 %      Lymphocyte % 16.2 %      Monocyte % 7.3 %      Eosinophil % 4.2 %      Basophil % 0.5 %      Immature Grans % 0.5 %      Neutrophils, Absolute 7.94 10*3/mm3      Lymphocytes, Absolute 1.81 10*3/mm3      Monocytes, Absolute 0.82 10*3/mm3      Eosinophils, Absolute 0.47 10*3/mm3      Basophils, Absolute 0.06 10*3/mm3      Immature Grans, Absolute 0.06 10*3/mm3      nRBC 0.0 /100 WBC     POC Troponin I [[id number]]  (Normal) Collected: 07/03/22 1435    Specimen: Blood Updated: 07/03/22 1448     Troponin I 0.01 ng/mL      Comment: Serial Number: [id number]:  [id number]              Imaging:  XR Chest 1 View    Result Date: 7/3/2022  PROCEDURE: XR CHEST 1 VW  COMPARISON: [facility], , CHEST AP/PA 1 VIEW, 9/02/2019, 9:36.  [facility], CR, XR CHEST 1 VW, 3/02/2022, 20:43.  INDICATIONS: Chest Pain triage protocol  FINDINGS:  The lungs are adequately expanded and appear grossly clear.  No pneumothorax or large pleural effusion is seen.  Heart size is not enlarged.       No acute cardiopulmonary abnormality is identified.       HOLLAND OVALLES MD       Electronically Signed and Approved By: HOLLAND OVALLES MD on 7/03/2022 at 15:19               Procedures:  Procedures    Progress  ED Course as of 07/03/22 1552   Sun Jul 03, 2022   1551 EKG:    Rhythm: sinus  Rate: 80  Axis: normal  Intervals: normal  ST Segment: no elevation    EKG Comparison: no previous    Interpreted by me   [BN]      ED Course User Index  [BN] Janette Black MD                            Medical Decision Making:  MDM  Number of Diagnoses or Management Options  Chest pain, unspecified type  Diagnosis management comments: The patient´s CBC was reviewed and shows no abnormalities of critical concern. Of note, there is no anemia requiring a blood transfusion and the platelet count is acceptable.  The patient´s  CMP was reviewed and shows no abnormalities of critical concern. Of note, the patient´s sodium and potassium are acceptable. The patient´s liver enzymes are unremarkable. The patient´s renal function (creatinine) is preserved. The patient has a normal anion gap.  Troponin is negative.  Chest x-ray is negative for pneumonia.  Case was discussed with Dr. Stokes.  Who agrees with admission.          Amount and/or Complexity of Data Reviewed  Clinical lab tests: reviewed  Tests in the radiology section of CPT®: reviewed  Tests in the medicine section of CPT®: reviewed  Discussion of test results with the performing providers: yes  Discuss the patient with other providers: yes  Independent visualization of images, tracings, or specimens: yes    Risk of Complications, Morbidity, and/or Mortality  Presenting problems: moderate  Management options: moderate    Patient Progress  Patient progress: stable       Final diagnoses:   Chest pain, unspecified type        Disposition:  ED Disposition     ED Disposition   Decision to Admit    Condition   --    Comment   --             This medical record created using voice recognition software.           David Barrera  07/03/22 1409       David Barrera  07/03/22 1415       Janette Black MD  07/03/22 3602

## 2022-07-03 NOTE — H&P
[facility] HISTORY AND PHYSICAL  Date: 7/3/2022   Patient Name: Jonathan Guerra  : 1955  MRN: [id number]  Primary Care Physician:  Casey Mosley MD  Date of admission: 7/3/2022    Subjective   Subjective     Chief Complaint: Chest pain x2 days    HPI:    Jonathan Guerra is a 66 y.o. male with medical history significant for GERD, COPD, hypertension, hyperlipidemia; presents with chest pain x2 days.  Patient reports approximately 3 days prior to admission, he went to eat out with his wife.  Patient states that when they both arrived home, they felt sick.  He believes that he might of had food poisoning at the time.  Patient states that they both went to lay down in order to sleep it off.  Patient notes that at the time he was having abdominal cramping along with nausea with no vomiting.  Patient states that due to to feeling sick, he had decreased level of activity along with a decreased appetite.  Patient notes that the following day he started to experience midsternal sharp chest pain.  Patient states that pain did not radiate.  Patient reports that he had similar pain a couple months ago.  Patient states that pain eventually resolved on its own.  Patient notes that when he was experiencing chest pain, he had diaphoresis.  Patient states that he was sweating so much, his hair was matted to his head.  Patient also notes having back pain and muscle cramping.  Patient states that approximately 1 week ago he did have some numbness in his left hand.  No fever, no URI type symptoms, no cough, no shortness of breath, no wheezing, no constipation, no diarrhea, no vomiting, no dizziness, no headache, no agitation.  Patient has noted having dysuria approximately 2 days ago.      Personal History     Past Medical History:  Past Medical History:   Diagnosis Date   • Acid reflux    • Acid reflux disease    • Anemia, unspecified    • Anxiety    • Arthritis    • Asthma    • Broken bones    •  Calcaneus fracture, left    • Chronic bronchitis (HCC)    • COPD (chronic obstructive pulmonary disease) (HCC)    • Depression    • Diverticulitis    • Emphysema lung (HCC)    • HBP (high blood pressure)    • Head injury     skull fracture    • Hernia cerebri (HCC)    • HTN (hypertension)    • Hyperlipemia    • Lung disease         Past Surgical History:  Past Surgical History:   Procedure Laterality Date   • COLONOSCOPY  ,    • ENDOSCOPY          Family History:   Family History   Problem Relation Age of Onset   • Other Mother         Thyroid Gland Neoplasm   • Heart disease Mother    • Colon cancer Mother    • Diabetes Sister    • Colon cancer Paternal Grandmother         Social History:   Social History     Socioeconomic History   • Marital status: Legally    Tobacco Use   • Smoking status: Former Smoker     Packs/day: 1.50     Years: 40.00     Pack years: 60.00     Types: Cigarettes     Quit date:      Years since quittin.5   • Smokeless tobacco: Never Used   Substance and Sexual Activity   • Alcohol use: Never     Comment: Ocassional    • Drug use: Never        Home Medications:  Prior to Admission medications    Medication Sig Start Date End Date Taking? Authorizing Provider   albuterol sulfate  (90 Base) MCG/ACT inhaler Every 12 (Twelve) Hours.    Provider, MD Dillon   Budeson-Glycopyrrol-Formoterol (Breztri Aerosphere) 160-9-4.8 MCG/ACT aerosol inhaler Inhale 2 puffs 2 (Two) Times a Day. Symptoms not controlled with Advair and Combivent 22   Casey Mosley MD   cetirizine (zyrTEC) 10 MG tablet TAKE ONE TABLET BY MOUTH ONCE DAILY 22   Jin Green MD   Combivent Respimat  MCG/ACT inhaler INHALE 1 PUFF BY MOUTH BY MOUTH 4 TIMES DAILY. MAY USE 1 EXTRA PUFF IF NEEDED. DON'T EXCEED 6 PUFFS PER 24 HOURS. 22   Jin Green MD   Fluticasone-Salmeterol (ADVAIR) 250-50 MCG/ACT DISKUS Inhale 2 (Two) Times a Day.    Provider, MD Dillon    fluticasone-salmeterol (ADVAIR) 250-50 MCG/DOSE DISKUS Inhale 1 puff 3 (Three) Times a Day.    ProviderDillon MD   hydroCHLOROthiazide (HYDRODIURIL) 12.5 MG tablet Take 12.5 mg by mouth Daily. 6/16/21   Dillon Mart MD   lisinopril (PRINIVIL,ZESTRIL) 20 MG tablet Take 1 tablet by mouth Daily. 8/17/21   Jin Green MD   metoprolol succinate XL (Toprol XL) 25 MG 24 hr tablet Take 1 tablet by mouth 2 (two) times a day. 8/17/21   Jin Green MD   omeprazole (priLOSEC) 20 MG capsule Take 1 capsule by mouth Daily. 5/11/22   Casey Mosley MD   predniSONE (DELTASONE) 20 MG tablet Take 2 tablets by mouth Daily. 5/11/22   Casey Mosley MD        Allergies:  Allergies   Allergen Reactions   • Azithromycin Unknown - High Severity   • Codeine Unknown - High Severity   • Penicillins Unknown - High Severity       Review of Systems  Review of Systems   Constitutional: Positive for activity change, appetite change, chills and diaphoresis. Negative for fatigue and fever.   HENT: Negative for congestion, ear pain, postnasal drip, rhinorrhea, sinus pressure, sinus pain, sneezing and sore throat.    Eyes: Negative for itching.   Respiratory: Negative for cough, shortness of breath and wheezing.    Cardiovascular: Positive for chest pain. Negative for leg swelling.   Gastrointestinal: Positive for abdominal pain and nausea. Negative for constipation, diarrhea and vomiting.   Endocrine: Negative for polydipsia and polyuria.   Genitourinary: Positive for dysuria. Negative for decreased urine volume, difficulty urinating, flank pain, frequency and urgency.   Musculoskeletal: Positive for arthralgias, back pain, joint swelling and myalgias.   Neurological: Positive for numbness. Negative for dizziness and headaches.   Psychiatric/Behavioral: Negative for agitation and confusion.           Objective   Objective     Vitals:   Temp:  [98 °F (36.7 °C)] 98 °F (36.7 °C)  Heart Rate:  [68-90] 68  Resp:  [24] 24  BP:  (146-156)/(80-99) 156/82    Physical Exam   Physical Exam  Constitutional:       General: He is not in acute distress.     Appearance: Normal appearance. He is not ill-appearing.   HENT:      Head: Normocephalic and atraumatic.      Right Ear: External ear normal.      Left Ear: External ear normal.      Nose: Nose normal.      Mouth/Throat:      Pharynx: Oropharynx is clear.   Eyes:      Extraocular Movements: Extraocular movements intact.      Pupils: Pupils are equal, round, and reactive to light.   Cardiovascular:      Rate and Rhythm: Normal rate and regular rhythm.      Pulses: Normal pulses.      Heart sounds: Normal heart sounds. No murmur heard.    No friction rub. No gallop.   Pulmonary:      Effort: Pulmonary effort is normal. No respiratory distress.      Breath sounds: Normal breath sounds. No wheezing, rhonchi or rales.   Abdominal:      General: Bowel sounds are normal. There is no distension.      Tenderness: There is no abdominal tenderness.   Musculoskeletal:         General: No swelling. Normal range of motion.      Cervical back: Normal range of motion and neck supple.   Skin:     General: Skin is warm.      Capillary Refill: Capillary refill takes less than 2 seconds.   Neurological:      General: No focal deficit present.      Mental Status: He is alert.          Result Review    Result Review:  Glucose   Date Value Ref Range Status   07/03/2022 133 (H) 65 - 99 mg/dL Final     BUN   Date Value Ref Range Status   07/03/2022 17 8 - 23 mg/dL Final     Creatinine   Date Value Ref Range Status   07/03/2022 1.65 (H) 0.76 - 1.27 mg/dL Final     Sodium   Date Value Ref Range Status   07/03/2022 139 136 - 145 mmol/L Final     Potassium   Date Value Ref Range Status   07/03/2022 3.6 3.5 - 5.2 mmol/L Final     Chloride   Date Value Ref Range Status   07/03/2022 103 98 - 107 mmol/L Final     CO2   Date Value Ref Range Status   07/03/2022 23.4 22.0 - 29.0 mmol/L Final     Calcium   Date Value Ref Range  Status   07/03/2022 9.6 8.6 - 10.5 mg/dL Final     Total Protein   Date Value Ref Range Status   07/03/2022 7.0 6.0 - 8.5 g/dL Final     Albumin   Date Value Ref Range Status   07/03/2022 4.30 3.50 - 5.20 g/dL Final     ALT (SGPT)   Date Value Ref Range Status   07/03/2022 22 1 - 41 U/L Final     AST (SGOT)   Date Value Ref Range Status   07/03/2022 21 1 - 40 U/L Final     Alkaline Phosphatase   Date Value Ref Range Status   07/03/2022 134 (H) 39 - 117 U/L Final     Total Bilirubin   Date Value Ref Range Status   07/03/2022 0.8 0.0 - 1.2 mg/dL Final     BUN/Creatinine Ratio   Date Value Ref Range Status   07/03/2022 10.3 7.0 - 25.0 Final     Anion Gap   Date Value Ref Range Status   07/03/2022 12.6 5.0 - 15.0 mmol/L Final      WBC   Date Value Ref Range Status   07/03/2022 11.16 (H) 3.40 - 10.80 10*3/mm3 Final     RBC   Date Value Ref Range Status   07/03/2022 4.97 4.14 - 5.80 10*6/mm3 Final     Hemoglobin   Date Value Ref Range Status   07/03/2022 15.5 13.0 - 17.7 g/dL Final     Hematocrit   Date Value Ref Range Status   07/03/2022 47.0 37.5 - 51.0 % Final     MCV   Date Value Ref Range Status   07/03/2022 94.6 79.0 - 97.0 fL Final     MCH   Date Value Ref Range Status   07/03/2022 31.2 26.6 - 33.0 pg Final     MCHC   Date Value Ref Range Status   07/03/2022 33.0 31.5 - 35.7 g/dL Final     RDW   Date Value Ref Range Status   07/03/2022 12.5 12.3 - 15.4 % Final     RDW-SD   Date Value Ref Range Status   07/03/2022 43.3 37.0 - 54.0 fl Final     MPV   Date Value Ref Range Status   07/03/2022 11.5 6.0 - 12.0 fL Final     Platelets   Date Value Ref Range Status   07/03/2022 207 140 - 450 10*3/mm3 Final     Neutrophil %   Date Value Ref Range Status   07/03/2022 71.3 42.7 - 76.0 % Final     Lymphocyte %   Date Value Ref Range Status   07/03/2022 16.2 (L) 19.6 - 45.3 % Final     Monocyte %   Date Value Ref Range Status   07/03/2022 7.3 5.0 - 12.0 % Final     Eosinophil %   Date Value Ref Range Status   07/03/2022 4.2 0.3  - 6.2 % Final     Basophil %   Date Value Ref Range Status   07/03/2022 0.5 0.0 - 1.5 % Final     Immature Grans %   Date Value Ref Range Status   07/03/2022 0.5 0.0 - 0.5 % Final     Neutrophils, Absolute   Date Value Ref Range Status   07/03/2022 7.94 (H) 1.70 - 7.00 10*3/mm3 Final     Lymphocytes, Absolute   Date Value Ref Range Status   07/03/2022 1.81 0.70 - 3.10 10*3/mm3 Final     Monocytes, Absolute   Date Value Ref Range Status   07/03/2022 0.82 0.10 - 0.90 10*3/mm3 Final     Eosinophils, Absolute   Date Value Ref Range Status   07/03/2022 0.47 (H) 0.00 - 0.40 10*3/mm3 Final     Basophils, Absolute   Date Value Ref Range Status   07/03/2022 0.06 0.00 - 0.20 10*3/mm3 Final     Immature Grans, Absolute   Date Value Ref Range Status   07/03/2022 0.06 (H) 0.00 - 0.05 10*3/mm3 Final     nRBC   Date Value Ref Range Status   07/03/2022 0.0 0.0 - 0.2 /100 WBC Final      UA    Urinalysis 4/1/22   Specific [place], UA 1.011   Ketones, UA Negative   Blood, UA Negative   Leukocytes, UA Negative   Nitrite, UA Negative              Imaging:  XR Chest 1 View    Result Date: 7/3/2022  PROCEDURE: XR CHEST 1 VW  COMPARISON: [facility], CR, CHEST AP/PA 1 VIEW, 9/02/2019, 9:36.  [facility], CR, XR CHEST 1 VW, 3/02/2022, 20:43.  INDICATIONS: Chest Pain triage protocol  FINDINGS:  The lungs are adequately expanded and appear grossly clear.  No pneumothorax or large pleural effusion is seen.  Heart size is not enlarged.       No acute cardiopulmonary abnormality is identified.       HOLLAND OVALLES MD       Electronically Signed and Approved By: HOLLAND OVALLES MD on 7/03/2022 at 15:19                  Assessment & Plan   Assessment / Plan     Active Hospital Problems    Diagnosis  POA   • Chest pain [R07.9]  Yes     Priority: High   • Acute renal failure (ARF) (HCC) [N17.9]  Yes     Priority: High   • Dysuria [R30.0]  Yes     Priority: Medium   • Chronic obstructive pulmonary disease (HCC) [J44.9]   Yes     Priority: Low   • Hyperlipemia [E78.5]  Yes     Priority: Low   • Hypertension [I10]  Yes     Priority: Low   • Acid reflux [K21.9]  Yes     Priority: Low        Assessment/Plan:   1. Chest pain-patient presents with chest pain x2 days.  Patient reports chest pain being midsternal and sharp in nature.  Patient states that chest pain at that time did not radiate, but notes having some numbness in his left hand approximately 1 week ago.  Patient does not have a personal history of coronary artery disease.  Patient states that his grandfather  at age 74 of a heart attack.  Patient will be ruled out for acute coronary syndrome.  · Patient will be placed on telemetry  · We will get serial troponins  · We will get a 2D echo  · We will get a stress test  · Patient placed on aspirin and a statin  · Check lipid panel and hemoglobin A1c in the morning  · Cardiology consulted  2. Acute renal failure-patient noted to have an elevation in his creatinine.  Patient states that he believes he had food poisoning approximately 3 days ago.  Patient notes that he has had decreased oral intake over the past couple of days.  Patient may have some dehydration causing acute renal failure.  Patient will be given gentle IV fluid hydration.  We will check creatinine in the morning.  3. Dysuria-patient reports having some burning sensation when he urinates.  Patient states this happened approximately 2 days ago.  We will check a UA.  If positive for urinary tract infection, will start antibiotics.  4. COPD-patient with history of COPD.  Patient does not appear to be in COPD exacerbation at this time.  Patient will be continued on his home inhalers and nebulizers.  5. Hyperlipidemia-patient with history of hyperlipidemia.  Patient will be continued on his home statin.  6. GERD-patient with history of acid reflux.  Patient will be continued on a PPI.      DVT prophylaxis:  Medical DVT prophylaxis orders are present.    CODE STATUS:     Level Of Support Discussed With: Patient  Code Status (Patient has no pulse and is not breathing): CPR (Attempt to Resuscitate)  Medical Interventions (Patient has pulse or is breathing): Full Support      Admission Status:  I believe this patient meets observation status.    Electronically signed by Meron Driver MD, 07/03/22, 3:56 PM EDT.

## 2022-07-04 ENCOUNTER — APPOINTMENT (OUTPATIENT)
Dept: NUCLEAR MEDICINE | Facility: HOSPITAL | Age: 67
End: 2022-07-04

## 2022-07-04 ENCOUNTER — READMISSION MANAGEMENT (OUTPATIENT)
Dept: CALL CENTER | Facility: HOSPITAL | Age: 67
End: 2022-07-04

## 2022-07-04 VITALS
RESPIRATION RATE: 20 BRPM | OXYGEN SATURATION: 98 % | HEIGHT: 62 IN | BODY MASS INDEX: 30.91 KG/M2 | WEIGHT: 167.99 LBS | TEMPERATURE: 97.9 F | HEART RATE: 92 BPM | DIASTOLIC BLOOD PRESSURE: 52 MMHG | SYSTOLIC BLOOD PRESSURE: 136 MMHG

## 2022-07-04 LAB
ANION GAP SERPL CALCULATED.3IONS-SCNC: 13.4 MMOL/L (ref 5–15)
BASOPHILS # BLD AUTO: 0.02 10*3/MM3 (ref 0–0.2)
BASOPHILS NFR BLD AUTO: 0.2 % (ref 0–1.5)
BH CV IMMEDIATE POST TECH DATA BLOOD PRESSURE: NORMAL MMHG
BH CV IMMEDIATE POST TECH DATA HEART RATE: 121 BPM
BH CV IMMEDIATE POST TECH DATA OXYGEN SATS: 97 %
BH CV REST NUCLEAR ISOTOPE DOSE: 10.6 MCI
BH CV SIX MINUTE RECOVERY TECH DATA BLOOD PRESSURE: NORMAL
BH CV SIX MINUTE RECOVERY TECH DATA HEART RATE: 103 BPM
BH CV SIX MINUTE RECOVERY TECH DATA OXYGEN SATURATION: 96 %
BH CV STRESS BP STAGE 1: NORMAL
BH CV STRESS BP STAGE 2: NORMAL
BH CV STRESS BP STAGE 3: NORMAL
BH CV STRESS BP STAGE 4: NORMAL
BH CV STRESS COMMENTS STAGE 1: NORMAL
BH CV STRESS COMMENTS STAGE 2: NORMAL
BH CV STRESS DOSE REGADENOSON STAGE 1: 0.4
BH CV STRESS DURATION MIN STAGE 1: 0
BH CV STRESS DURATION MIN STAGE 2: 4
BH CV STRESS DURATION SEC STAGE 1: 10
BH CV STRESS DURATION SEC STAGE 2: 0
BH CV STRESS HR STAGE 1: 100
BH CV STRESS HR STAGE 2: 107
BH CV STRESS HR STAGE 3: 108
BH CV STRESS HR STAGE 4: 120
BH CV STRESS NUCLEAR ISOTOPE DOSE: 37.5 MCI
BH CV STRESS O2 STAGE 1: 92
BH CV STRESS O2 STAGE 2: 91
BH CV STRESS O2 STAGE 3: 93
BH CV STRESS O2 STAGE 4: 97
BH CV STRESS PROTOCOL 1: NORMAL
BH CV STRESS RECOVERY BP: NORMAL MMHG
BH CV STRESS RECOVERY HR: 103 BPM
BH CV STRESS RECOVERY O2: 96 %
BH CV STRESS STAGE 1: 1
BH CV STRESS STAGE 2: 2
BH CV STRESS STAGE 3: 3
BH CV STRESS STAGE 4: 4
BH CV STRESS STAGE 5: 5
BH CV THREE MINUTE POST TECH DATA BLOOD PRESSURE: NORMAL MMHG
BH CV THREE MINUTE POST TECH DATA HEART RATE: 104 BPM
BH CV THREE MINUTE POST TECH DATA OXYGEN SATURATION: 96 %
BUN SERPL-MCNC: 15 MG/DL (ref 8–23)
BUN/CREAT SERPL: 14.3 (ref 7–25)
CALCIUM SPEC-SCNC: 9.7 MG/DL (ref 8.6–10.5)
CHLORIDE SERPL-SCNC: 102 MMOL/L (ref 98–107)
CHOLEST SERPL-MCNC: 205 MG/DL (ref 0–200)
CO2 SERPL-SCNC: 21.6 MMOL/L (ref 22–29)
CREAT SERPL-MCNC: 1.05 MG/DL (ref 0.76–1.27)
DEPRECATED RDW RBC AUTO: 41.1 FL (ref 37–54)
EGFRCR SERPLBLD CKD-EPI 2021: 79.3 ML/MIN/1.73
EOSINOPHIL # BLD AUTO: 0.01 10*3/MM3 (ref 0–0.4)
EOSINOPHIL NFR BLD AUTO: 0.1 % (ref 0.3–6.2)
ERYTHROCYTE [DISTWIDTH] IN BLOOD BY AUTOMATED COUNT: 12.3 % (ref 12.3–15.4)
GLUCOSE SERPL-MCNC: 141 MG/DL (ref 65–99)
HCT VFR BLD AUTO: 45.1 % (ref 37.5–51)
HDLC SERPL-MCNC: 46 MG/DL (ref 40–60)
HGB BLD-MCNC: 15.4 G/DL (ref 13–17.7)
IMM GRANULOCYTES # BLD AUTO: 0.07 10*3/MM3 (ref 0–0.05)
IMM GRANULOCYTES NFR BLD AUTO: 0.6 % (ref 0–0.5)
LDLC SERPL CALC-MCNC: 138 MG/DL (ref 0–100)
LDLC/HDLC SERPL: 2.95 {RATIO}
LV EF NUC BP: 55 %
LYMPHOCYTES # BLD AUTO: 0.99 10*3/MM3 (ref 0.7–3.1)
LYMPHOCYTES NFR BLD AUTO: 8.2 % (ref 19.6–45.3)
MAGNESIUM SERPL-MCNC: 2.1 MG/DL (ref 1.6–2.4)
MAXIMAL PREDICTED HEART RATE: 156 BPM
MCH RBC QN AUTO: 31.4 PG (ref 26.6–33)
MCHC RBC AUTO-ENTMCNC: 34.1 G/DL (ref 31.5–35.7)
MCV RBC AUTO: 91.9 FL (ref 79–97)
MONOCYTES # BLD AUTO: 0.46 10*3/MM3 (ref 0.1–0.9)
MONOCYTES NFR BLD AUTO: 3.8 % (ref 5–12)
NEUTROPHILS NFR BLD AUTO: 10.53 10*3/MM3 (ref 1.7–7)
NEUTROPHILS NFR BLD AUTO: 87.1 % (ref 42.7–76)
NRBC BLD AUTO-RTO: 0 /100 WBC (ref 0–0.2)
PERCENT MAX PREDICTED HR: 77.56 %
PLATELET # BLD AUTO: 199 10*3/MM3 (ref 140–450)
PMV BLD AUTO: 11.7 FL (ref 6–12)
POTASSIUM SERPL-SCNC: 4.4 MMOL/L (ref 3.5–5.2)
QT INTERVAL: 383 MS
QT INTERVAL: 389 MS
QT INTERVAL: 407 MS
RBC # BLD AUTO: 4.91 10*6/MM3 (ref 4.14–5.8)
SODIUM SERPL-SCNC: 137 MMOL/L (ref 136–145)
STRESS BASELINE BP: NORMAL MMHG
STRESS BASELINE HR: 85 BPM
STRESS PERCENT HR: 91 %
STRESS POST O2 SAT PEAK: 97 %
STRESS POST PEAK BP: NORMAL MMHG
STRESS POST PEAK HR: 121 BPM
STRESS TARGET HR: 133 BPM
TRIGL SERPL-MCNC: 116 MG/DL (ref 0–150)
TROPONIN T SERPL-MCNC: <0.01 NG/ML (ref 0–0.03)
TSH SERPL DL<=0.05 MIU/L-ACNC: 0.44 UIU/ML (ref 0.27–4.2)
VLDLC SERPL-MCNC: 21 MG/DL (ref 5–40)
WBC NRBC COR # BLD: 12.08 10*3/MM3 (ref 3.4–10.8)

## 2022-07-04 PROCEDURE — G0378 HOSPITAL OBSERVATION PER HR: HCPCS

## 2022-07-04 PROCEDURE — 78452 HT MUSCLE IMAGE SPECT MULT: CPT

## 2022-07-04 PROCEDURE — 94664 DEMO&/EVAL PT USE INHALER: CPT

## 2022-07-04 PROCEDURE — 0 TECHNETIUM TETROFOSMIN KIT: Performed by: INTERNAL MEDICINE

## 2022-07-04 PROCEDURE — 93010 ELECTROCARDIOGRAM REPORT: CPT | Performed by: INTERNAL MEDICINE

## 2022-07-04 PROCEDURE — 99217 PR OBSERVATION CARE DISCHARGE MANAGEMENT: CPT | Performed by: INTERNAL MEDICINE

## 2022-07-04 PROCEDURE — 85025 COMPLETE CBC W/AUTO DIFF WBC: CPT | Performed by: HOSPITALIST

## 2022-07-04 PROCEDURE — 94799 UNLISTED PULMONARY SVC/PX: CPT

## 2022-07-04 PROCEDURE — 93018 CV STRESS TEST I&R ONLY: CPT | Performed by: INTERNAL MEDICINE

## 2022-07-04 PROCEDURE — 83735 ASSAY OF MAGNESIUM: CPT | Performed by: HOSPITALIST

## 2022-07-04 PROCEDURE — 63710000001 PREDNISONE PER 1 MG: Performed by: HOSPITALIST

## 2022-07-04 PROCEDURE — 78452 HT MUSCLE IMAGE SPECT MULT: CPT | Performed by: INTERNAL MEDICINE

## 2022-07-04 PROCEDURE — 80061 LIPID PANEL: CPT | Performed by: INTERNAL MEDICINE

## 2022-07-04 PROCEDURE — 96361 HYDRATE IV INFUSION ADD-ON: CPT

## 2022-07-04 PROCEDURE — 96366 THER/PROPH/DIAG IV INF ADDON: CPT

## 2022-07-04 PROCEDURE — 83036 HEMOGLOBIN GLYCOSYLATED A1C: CPT | Performed by: HOSPITALIST

## 2022-07-04 PROCEDURE — 80048 BASIC METABOLIC PNL TOTAL CA: CPT | Performed by: HOSPITALIST

## 2022-07-04 PROCEDURE — 84484 ASSAY OF TROPONIN QUANT: CPT | Performed by: INTERNAL MEDICINE

## 2022-07-04 PROCEDURE — 93005 ELECTROCARDIOGRAM TRACING: CPT | Performed by: HOSPITALIST

## 2022-07-04 PROCEDURE — 84443 ASSAY THYROID STIM HORMONE: CPT | Performed by: INTERNAL MEDICINE

## 2022-07-04 PROCEDURE — 99204 OFFICE O/P NEW MOD 45 MIN: CPT | Performed by: INTERNAL MEDICINE

## 2022-07-04 PROCEDURE — 25010000002 REGADENOSON 0.4 MG/5ML SOLUTION

## 2022-07-04 PROCEDURE — 93017 CV STRESS TEST TRACING ONLY: CPT

## 2022-07-04 PROCEDURE — A9502 TC99M TETROFOSMIN: HCPCS | Performed by: INTERNAL MEDICINE

## 2022-07-04 RX ORDER — ATORVASTATIN CALCIUM 40 MG/1
80 TABLET, FILM COATED ORAL NIGHTLY
Status: DISCONTINUED | OUTPATIENT
Start: 2022-07-04 | End: 2022-07-04 | Stop reason: HOSPADM

## 2022-07-04 RX ORDER — ATORVASTATIN CALCIUM 80 MG/1
80 TABLET, FILM COATED ORAL NIGHTLY
Qty: 90 TABLET | Refills: 0 | Status: SHIPPED | OUTPATIENT
Start: 2022-07-04 | End: 2022-11-17

## 2022-07-04 RX ORDER — ASPIRIN 81 MG/1
81 TABLET ORAL DAILY
Qty: 30 TABLET | Refills: 0 | Status: SHIPPED | OUTPATIENT
Start: 2022-07-05 | End: 2022-08-04

## 2022-07-04 RX ORDER — ALBUTEROL SULFATE 90 UG/1
2 AEROSOL, METERED RESPIRATORY (INHALATION)
Start: 2022-07-04

## 2022-07-04 RX ADMIN — TETROFOSMIN 1 DOSE: 1.38 INJECTION, POWDER, LYOPHILIZED, FOR SOLUTION INTRAVENOUS at 11:25

## 2022-07-04 RX ADMIN — Medication 10 ML: at 12:24

## 2022-07-04 RX ADMIN — PREDNISONE 40 MG: 20 TABLET ORAL at 12:51

## 2022-07-04 RX ADMIN — SODIUM CHLORIDE 100 ML/HR: 9 INJECTION, SOLUTION INTRAVENOUS at 06:02

## 2022-07-04 RX ADMIN — METOPROLOL SUCCINATE 25 MG: 25 TABLET, EXTENDED RELEASE ORAL at 12:25

## 2022-07-04 RX ADMIN — HYDROCHLOROTHIAZIDE 12.5 MG: 12.5 CAPSULE ORAL at 12:25

## 2022-07-04 RX ADMIN — CETIRIZINE HYDROCHLORIDE 10 MG: 10 TABLET, FILM COATED ORAL at 12:25

## 2022-07-04 RX ADMIN — ARFORMOTEROL TARTRATE: 15 SOLUTION RESPIRATORY (INHALATION) at 06:17

## 2022-07-04 RX ADMIN — ALBUTEROL SULFATE 2 PUFF: 90 AEROSOL, METERED RESPIRATORY (INHALATION) at 06:34

## 2022-07-04 RX ADMIN — PANTOPRAZOLE SODIUM 40 MG: 40 TABLET, DELAYED RELEASE ORAL at 06:03

## 2022-07-04 RX ADMIN — IPRATROPIUM BROMIDE AND ALBUTEROL SULFATE 3 ML: .5; 3 SOLUTION RESPIRATORY (INHALATION) at 01:25

## 2022-07-04 RX ADMIN — IPRATROPIUM BROMIDE AND ALBUTEROL SULFATE 3 ML: .5; 3 SOLUTION RESPIRATORY (INHALATION) at 06:17

## 2022-07-04 RX ADMIN — TETROFOSMIN 1 DOSE: 1.38 INJECTION, POWDER, LYOPHILIZED, FOR SOLUTION INTRAVENOUS at 08:47

## 2022-07-04 RX ADMIN — ASPIRIN 81 MG: 81 TABLET, COATED ORAL at 12:25

## 2022-07-04 RX ADMIN — LISINOPRIL 20 MG: 20 TABLET ORAL at 12:25

## 2022-07-04 RX ADMIN — REGADENOSON 0.4 MG: 0.08 INJECTION, SOLUTION INTRAVENOUS at 11:25

## 2022-07-04 NOTE — OUTREACH NOTE
Prep Survey    Flowsheet Row Responses   [facility] facility patient discharged from? [facility]   Is LACE score < 7 ? Yes   Emergency Room discharge w/ pulse ox? No   Eligibility [facility]   Date of Admission 07/03/22   Date of Discharge 07/04/22   Discharge Disposition Home or Self Care   Discharge diagnosis Chest pain  Acute renal failure   Does the patient have one of the following disease processes/diagnoses(primary or secondary)? Other   Does the patient have Home health ordered? No   Is there a DME ordered? No   Prep survey completed? Yes          LEONIDES MEMBRENO - Registered Nurse

## 2022-07-04 NOTE — DISCHARGE SUMMARY
[facility]         HOSPITALIST  DISCHARGE SUMMARY    Patient Name: Jonathan Guerra  : 1955  MRN: [id number]    Date of Admission: 7/3/2022  Date of Discharge:  2022  Primary Care Physician: Casey Mosley MD    Consults     Date and Time Order Name Status Description    7/3/2022  8:36 PM Inpatient Cardiology Consult      7/3/2022  3:00 PM Inpatient Hospitalist Consult            Active and Resolved Hospital Problems:  Chest pain  Acute renal failure  Dysuria  COPD  Hyperlipidemia  GERD    Hospital Course     Hospital Course:  Jonathan Guerra is a 66 y.o. male with medical history significant for GERD, COPD, hypertension, hyperlipidemia; presents with chest pain x2 days.  Patient reports approximately 3 days prior to admission, he went to eat out with his wife.  Patient states that when they both arrived home, they felt sick.  He believes that he might of had food poisoning at the time.  Patient states that they both went to lay down in order to sleep it off.  Patient notes that at the time he was having abdominal cramping along with nausea with no vomiting.  Patient states that due to to feeling sick, he had decreased level of activity along with a decreased appetite.  Patient notes that the following day he started to experience midsternal sharp chest pain.  Patient states that pain did not radiate.  Patient reports that he had similar pain a couple months ago.  Patient states that pain eventually resolved on its own.  Patient notes that when he was experiencing chest pain, he had diaphoresis.  Patient states that he was sweating so much, his hair was matted to his head.  Patient also notes having back pain and muscle cramping.  Patient states that approximately 1 week ago he did have some numbness in his left hand.  No fever, no URI type symptoms, no cough, no shortness of breath, no wheezing, no constipation, no diarrhea, no vomiting, no dizziness, no headache, no agitation.   Patient has noted having dysuria approximately 2 days ago.  Upon my interview patient was chest pain-free.  Patient underwent cardiac stress test which was low risk for any ischemia.  Patient was deemed safe to discharge home.  Patient should follow-up with her PCP in 3 7 days of discharge.    Day of Discharge     Vital Signs:  Temp:  [97.2 °F (36.2 °C)-98.2 °F (36.8 °C)] 97.9 °F (36.6 °C)  Heart Rate:  [72-92] 92  Resp:  [16-20] 20  BP: (126-159)/(45-85) 136/52    Physical Exam:   General appearance: NAD, conversant   Eyes: anicteric sclerae, moist conjunctivae; no lid-lag; PERRLA  HENT: Atraumatic; oropharynx clear with moist mucous membranes and no mucosal ulcerations; normal hard and soft palate  Neck: Trachea midline; FROM, supple, no thyromegaly or lymphadenopathy  Lungs: CTA, with normal respiratory effort and no intercostal retractions  CV: Regular Rate and Rhythm, no Murmurs, Rubs, or Gallops   Abdomen: Soft, non-tender; no masses or Heptosplenomegally  Extremities: No peripheral edema or extremity lymphadenopathy  Skin: Normal temperature, turgor and texture; no rash, ulcers or subcutaneous nodules  Psych: Appropriate affect, alert and oriented to person, place and time  Neuro: CN II - XII intact no motor deficits, no sensory defecits      Discharge Details        Discharge Medications      New Medications      Instructions Start Date   aspirin 81 MG EC tablet   81 mg, Oral, Daily   Start Date: July 5, 2022     atorvastatin 80 MG tablet  Commonly known as: LIPITOR   80 mg, Oral, Nightly         Changes to Medications      Instructions Start Date   albuterol sulfate  (90 Base) MCG/ACT inhaler  Commonly known as: PROVENTIL HFA;VENTOLIN HFA;PROAIR HFA  What changed:   · how much to take  · how to take this  · when to take this   2 puffs, Inhalation, 4 Times Daily - RT      Combivent Respimat  MCG/ACT inhaler  Generic drug: ipratropium-albuterol  What changed: See the new instructions.   INHALE 1  PUFF BY MOUTH BY MOUTH 4 TIMES DAILY. MAY USE 1 EXTRA PUFF IF NEEDED. DON'T EXCEED 6 PUFFS PER 24 HOURS.         Continue These Medications      Instructions Start Date   cetirizine 10 MG tablet  Commonly known as: zyrTEC   TAKE ONE TABLET BY MOUTH ONCE DAILY      Fluticasone-Salmeterol 250-50 MCG/ACT DISKUS  Commonly known as: ADVAIR   1 puff, Inhalation, 2 Times Daily - RT      hydroCHLOROthiazide 12.5 MG tablet  Commonly known as: HYDRODIURIL   12.5 mg, Oral, Daily      lisinopril 10 MG tablet  Commonly known as: PRINIVIL,ZESTRIL   10 mg, Oral, Daily      metoprolol succinate XL 25 MG 24 hr tablet  Commonly known as: Toprol XL   25 mg, Oral, 2 times daily      omeprazole 20 MG capsule  Commonly known as: priLOSEC   20 mg, Oral, Daily             Allergies   Allergen Reactions   • Azithromycin Unknown - High Severity   • Codeine Unknown - High Severity   • Penicillins Unknown - High Severity       Discharge Disposition:  Home or Self Care    Diet:  Hospital:  Diet Order   Procedures   • Diet Regular; Cardiac       Discharge Activity:       CODE STATUS:  Code Status and Medical Interventions:   Ordered at: 07/03/22 2036     Level Of Support Discussed With:    Patient     Code Status (Patient has no pulse and is not breathing):    CPR (Attempt to Resuscitate)     Medical Interventions (Patient has pulse or is breathing):    Full Support       Future Appointments   Date Time Provider Department Center   11/11/2022 10:00 AM Casey Mosley MD [facility] [place] [facility]       Additional Instructions for the Follow-ups that You Need to Schedule     Discharge Follow-up with PCP   As directed       Currently Documented PCP:    Casey Mosley MD    PCP Phone Number:    [phone]     Follow Up Details: 3 to 7 days               Pertinent  and/or Most Recent Results     IMAGING:  XR Chest 1 View    Result Date: 7/3/2022  PROCEDURE: XR CHEST 1 VW  COMPARISON: [facility], CR, CHEST AP/PA 1 VIEW, 9/02/2019, 9:36.  [facility]  [facility], CR, XR CHEST 1 VW, 3/02/2022, 20:43.  INDICATIONS: Chest Pain triage protocol  FINDINGS:  The lungs are adequately expanded and appear grossly clear.  No pneumothorax or large pleural effusion is seen.  Heart size is not enlarged.       No acute cardiopulmonary abnormality is identified.       HOLLAND OVALLES MD       Electronically Signed and Approved By: HOLLAND OVALLES MD on 7/03/2022 at 15:19             Stress Test With Myocardial Perfusion One Day    Result Date: 7/4/2022  · Left ventricular ejection fraction is normal. (Calculated EF = 55%). · Findings consistent with a normal ECG stress test. · Diaphragmatic attenuation artifact is present. · Myocardial perfusion imaging indicates a normal myocardial perfusion study with no evidence of ischemia. · Impressions are consistent with a low risk study.        LAB RESULTS:      Lab 07/04/22  0501 07/03/22  1256   WBC 12.08* 11.16*   HEMOGLOBIN 15.4 15.5   HEMATOCRIT 45.1 47.0   PLATELETS 199 207   NEUTROS ABS 10.53* 7.94*   IMMATURE GRANS (ABS) 0.07* 0.06*   LYMPHS ABS 0.99 1.81   MONOS ABS 0.46 0.82   EOS ABS 0.01 0.47*   MCV 91.9 94.6         Lab 07/04/22  0501 07/03/22  1256   SODIUM 137 139   POTASSIUM 4.4 3.6   CHLORIDE 102 103   CO2 21.6* 23.4   ANION GAP 13.4 12.6   BUN 15 17   CREATININE 1.05 1.65*   EGFR 79.3 46.1*   GLUCOSE 141* 133*   CALCIUM 9.7 9.6   MAGNESIUM 2.1 2.2   TSH 0.436  --          Lab 07/03/22  1256   TOTAL PROTEIN 7.0   ALBUMIN 4.30   GLOBULIN 2.7   ALT (SGPT) 22   AST (SGOT) 21   BILIRUBIN 0.8   ALK PHOS 134*   LIPASE 26         Lab 07/04/22  0501 07/03/22  1256   PROBNP  --  229.1   TROPONIN T <0.010 <0.010         Lab 07/04/22  0501   CHOLESTEROL 205*   LDL CHOL 138*   HDL CHOL 46   TRIGLYCERIDES 116             Brief Urine Lab Results  (Last result in the past 365 days)      Color   Clarity   Blood   Leuk Est   Nitrite   Protein   CREAT   Urine HCG        07/03/22 2201 Yellow   Clear   Negative   Negative    Negative   Negative               Microbiology Results (last 10 days)     ** No results found for the last 240 hours. **                Time spent on Discharge including face to face service: Greater than 30 minutes      Electronically signed by Griffin Doshi MD, 07/04/22, 3:15 PM EDT.

## 2022-07-04 NOTE — PLAN OF CARE
Goal Outcome Evaluation:  Plan of Care Reviewed With: patient        Progress: improving  Outcome Evaluation: VSS. No complaints of chest pain overnight. NS infusing as ordered at 100ml/hr. Cardiology consult ordered. No complaints at this time. ZENAIDA,RN

## 2022-07-04 NOTE — CONSULTS
[facility]   CONSULTATION  Patient Name: Jonathan Guerra  : 1955  MRN: [id number]  Primary Care Physician:  Casey Mosley MD  Date of admission: 7/3/2022    Subjective   Subjective     Chief Complaint: Chest pain    History of Present Illness: Mr. Guerra is a 66 years old gentleman with hypertension hyperlipidemia who came in with chest pain.  He had food poisoning for 3 days before arrival and then the next day he had some abdominal discomfort and retrosternal burning sensation in his chest which would make him diaphoretic.  He again had the same chest discomfort last night and came to the emergency room.  The pain was resolved by the time he arrived.  There were no definite aggravating or relieving factors.  It did not radiate but caused to become extreme diaphoretic and somewhat short of breath he denied dizziness or syncope    Review of Systems   Constitutional: Negative for fatigue and fever.   HENT: Negative for hearing loss and trouble swallowing.    Eyes: Negative for visual disturbance.   Respiratory: Positive for chest tightness and shortness of breath. Negative for cough and wheezing.    Cardiovascular: Positive for chest pain. Negative for palpitations and leg swelling.   Gastrointestinal: Positive for abdominal distention, abdominal pain and nausea. Negative for vomiting.   Genitourinary: Negative for dysuria.   Musculoskeletal: Negative for myalgias.   Skin: Negative for rash.   Neurological: Negative for dizziness, syncope, light-headedness and headaches.      .negative for PND and negative for orthopnea.    Personal History       Past Medical History:   Diagnosis Date   • Acid reflux    • Acid reflux disease    • Anemia, unspecified    • Anxiety    • Arthritis    • Asthma    • Broken bones    • Calcaneus fracture, left    • Chronic bronchitis (HCC)    • COPD (chronic obstructive pulmonary disease) ([facility])    • Depression    • Diverticulitis    • Emphysema lung ([facility])    • HBP (high  blood pressure)    • Head injury     skull fracture    • Hernia cerebri (HCC)    • HTN (hypertension)    • Hyperlipemia    • Lung disease        Past Surgical History:   Procedure Laterality Date   • COLONOSCOPY  2014, 2019   • ENDOSCOPY         Family History: family history includes Colon cancer in his mother and paternal grandmother; Diabetes in his sister; Heart disease in his mother; Other in his mother. Otherwise pertinent FHx was reviewed and not pertinent to current issue.    Social History:  reports that he quit smoking about 16 years ago. His smoking use included cigarettes. He has a 60.00 pack-year smoking history. He has never used smokeless tobacco. He reports that he does not drink alcohol and does not use drugs.    Home Medications:  Fluticasone-Salmeterol, cetirizine, hydroCHLOROthiazide, ipratropium-albuterol, lisinopril, metoprolol succinate XL, and omeprazole    Allergies:  Allergies   Allergen Reactions   • Azithromycin Unknown - High Severity   • Codeine Unknown - High Severity   • Penicillins Unknown - High Severity       Objective    Objective     Vitals:   Temp:  [97.2 °F (36.2 °C)-98.2 °F (36.8 °C)] 97.9 °F (36.6 °C)  Heart Rate:  [72-92] 92  Resp:  [16-20] 20  BP: (126-159)/(45-85) 136/52    Vitals and nursing note reviewed.   Constitutional:       General: Not in acute distress.     Appearance: Normal and healthy appearance. Not in distress.   Eyes:      Conjunctiva/sclera: Conjunctivae normal.      Pupils: Pupils are equal, round, and reactive to light.   Neck:      Thyroid: Thyroid normal. No thyromegaly.      Vascular: No carotid bruit. JVD normal.      Lymphadenopathy: No cervical adenopathy.   Pulmonary:      Effort: Pulmonary effort is normal. Prolonged expiration present.      Breath sounds: Decreased air movement present. No wheezing. No rhonchi. No rales.   Cardiovascular:      PMI at left midclavicular line. Normal rate. Regular rhythm. S1 with normal intensity. Normal S2 with  normal intensity.      Murmurs: There is no murmur.      No gallop. No click. No rub.   Pulses:     Dorsalis pedis: 2+ bilaterally.     Posterior tibial: 2+ bilaterally.  Edema:     Peripheral edema absent.   Abdominal:      General: Bowel sounds are normal.      Palpations: Abdomen is soft. There is no hepatomegaly or splenomegaly.   Musculoskeletal: Normal range of motion.      Cervical back: Normal range of motion. Skin:     General: Skin is warm.   Neurological:      General: No focal deficit present.      Mental Status: Alert and oriented to person, place and time.   Psychiatric:         Attention and Perception: Attention normal.           Result Review    Result Review:  I have personally reviewed the results from the time of this admission to 7/4/2022 14:40 EDT and agree with these findings:  [x]  Laboratory  []  Microbiology  [x]  Radiology  [x]  EKG/Telemetry   []  Cardiology/Vascular   []  Pathology  [x]  Old records  []  Other:    WBC   Date Value Ref Range Status   07/04/2022 12.08 (H) 3.40 - 10.80 10*3/mm3 Final     RBC   Date Value Ref Range Status   07/04/2022 4.91 4.14 - 5.80 10*6/mm3 Final     Hemoglobin   Date Value Ref Range Status   07/04/2022 15.4 13.0 - 17.7 g/dL Final     Hematocrit   Date Value Ref Range Status   07/04/2022 45.1 37.5 - 51.0 % Final     MCV   Date Value Ref Range Status   07/04/2022 91.9 79.0 - 97.0 fL Final     MCH   Date Value Ref Range Status   07/04/2022 31.4 26.6 - 33.0 pg Final     MCHC   Date Value Ref Range Status   07/04/2022 34.1 31.5 - 35.7 g/dL Final     RDW   Date Value Ref Range Status   07/04/2022 12.3 12.3 - 15.4 % Final     RDW-SD   Date Value Ref Range Status   07/04/2022 41.1 37.0 - 54.0 fl Final     MPV   Date Value Ref Range Status   07/04/2022 11.7 6.0 - 12.0 fL Final     Platelets   Date Value Ref Range Status   07/04/2022 199 140 - 450 10*3/mm3 Final     Neutrophil %   Date Value Ref Range Status   07/04/2022 87.1 (H) 42.7 - 76.0 % Final     Lymphocyte  %   Date Value Ref Range Status   07/04/2022 8.2 (L) 19.6 - 45.3 % Final     Monocyte %   Date Value Ref Range Status   07/04/2022 3.8 (L) 5.0 - 12.0 % Final     Eosinophil %   Date Value Ref Range Status   07/04/2022 0.1 (L) 0.3 - 6.2 % Final     Basophil %   Date Value Ref Range Status   07/04/2022 0.2 0.0 - 1.5 % Final     Immature Grans %   Date Value Ref Range Status   07/04/2022 0.6 (H) 0.0 - 0.5 % Final     Neutrophils, Absolute   Date Value Ref Range Status   07/04/2022 10.53 (H) 1.70 - 7.00 10*3/mm3 Final     Lymphocytes, Absolute   Date Value Ref Range Status   07/04/2022 0.99 0.70 - 3.10 10*3/mm3 Final     Monocytes, Absolute   Date Value Ref Range Status   07/04/2022 0.46 0.10 - 0.90 10*3/mm3 Final     Eosinophils, Absolute   Date Value Ref Range Status   07/04/2022 0.01 0.00 - 0.40 10*3/mm3 Final     Basophils, Absolute   Date Value Ref Range Status   07/04/2022 0.02 0.00 - 0.20 10*3/mm3 Final     Immature Grans, Absolute   Date Value Ref Range Status   07/04/2022 0.07 (H) 0.00 - 0.05 10*3/mm3 Final     nRBC   Date Value Ref Range Status   07/04/2022 0.0 0.0 - 0.2 /100 WBC Final      Basic Metabolic Panel    Sodium Sodium   Date Value Ref Range Status   07/04/2022 137 136 - 145 mmol/L Final   07/03/2022 139 136 - 145 mmol/L Final      Potassium Potassium   Date Value Ref Range Status   07/04/2022 4.4 3.5 - 5.2 mmol/L Final   07/03/2022 3.6 3.5 - 5.2 mmol/L Final      Chloride Chloride   Date Value Ref Range Status   07/04/2022 102 98 - 107 mmol/L Final   07/03/2022 103 98 - 107 mmol/L Final      Bicarbonate No results found for: PLASMABICARB   BUN BUN   Date Value Ref Range Status   07/04/2022 15 8 - 23 mg/dL Final   07/03/2022 17 8 - 23 mg/dL Final      Creatinine Creatinine   Date Value Ref Range Status   07/04/2022 1.05 0.76 - 1.27 mg/dL Final   07/03/2022 1.65 (H) 0.76 - 1.27 mg/dL Final      Calcium Calcium   Date Value Ref Range Status   07/04/2022 9.7 8.6 - 10.5 mg/dL Final   07/03/2022 9.6 8.6 -  10.5 mg/dL Final      Glucose      No components found for: GLUCOSE.*  Lab Results   Component Value Date    GLUCOSE 141 (H) 07/04/2022    BUN 15 07/04/2022    CREATININE 1.05 07/04/2022    EGFRIFNONA 58 (L) 08/12/2021    BCR 14.3 07/04/2022    K 4.4 07/04/2022    CO2 21.6 (L) 07/04/2022    CALCIUM 9.7 07/04/2022    ALBUMIN 4.30 07/03/2022    LABIL2 1.7 02/12/2021    AST 21 07/03/2022    ALT 22 07/03/2022      CARDIAC LABS:      Lab 07/04/22  0501 07/03/22  1256   PROBNP  --  229.1   TROPONIN T <0.010 <0.010      TSH Results:      Lab 07/04/22  0501   TSH 0.436      CPK    Common Labsle 7/3/22   Creatine Kinase 105            No results found for this or any previous visit.   COVID19   Date Value Ref Range Status   03/02/2022 Not Detected Not Detected - Ref. Range Final      EKG: Sinus rhythm normal axis PVCs.   chest X-ray   CT-scan of the chest              Most notable findings include: Gentleman with chest pain and negative cardiac enzymes.    Assessment & Plan   Assessment / Plan     Brief Patient Summary:  Jonathan Guerra is a 66 y.o. male who who came in with chest pain and abdominal discomfort may have GI symptoms but he also has chest pain and multiple risk factors for premature atherosclerosis.    Active Hospital Problems:  Active Hospital Problems    Diagnosis    • Chest pain    • Acute renal failure (ARF) (HCC)    • Dysuria    • Chronic obstructive pulmonary disease (HCC)    • Hyperlipemia    • Hypertension    • Acid reflux      Plan: Since his enzymes have come back negative we will proceed with nuclear stress test.  Recommend increasing the dose of his Lipitor to 80 mg a day since its not regulated and his LDL is 138.      DVT prophylaxis:  Medical DVT prophylaxis orders are present.    CODE STATUS:    Level Of Support Discussed With: Patient  Code Status (Patient has no pulse and is not breathing): CPR (Attempt to Resuscitate)  Medical Interventions (Patient has pulse or is breathing): Full  Support    Admission Status:  I believe this patient meets observation status.    Electronically signed by Rodolfo Dan MD, 07/04/22, 2:40 PM EDT.

## 2022-07-05 ENCOUNTER — TRANSITIONAL CARE MANAGEMENT TELEPHONE ENCOUNTER (OUTPATIENT)
Dept: CALL CENTER | Facility: HOSPITAL | Age: 67
End: 2022-07-05

## 2022-07-05 LAB — HBA1C MFR BLD: 5.6 % (ref 4.8–5.6)

## 2022-07-05 NOTE — OUTREACH NOTE
Call Center TCM Note    Flowsheet Row Responses   [facility] patient discharged from? [facility]   Does the patient have one of the following disease processes/diagnoses(primary or secondary)? Other   TCM attempt successful? No   Unsuccessful attempts Attempt 2          Marbella Carrillo RN    7/5/2022, 14:42 EDT

## 2022-07-05 NOTE — OUTREACH NOTE
Call Center TCM Note    Flowsheet Row Responses   [facility] patient discharged from? [facility]   Does the patient have one of the following disease processes/diagnoses(primary or secondary)? Other   TCM attempt successful? No   Unsuccessful attempts Attempt 1          Marbella Carrillo RN    7/5/2022, 11:04 EDT

## 2022-07-06 ENCOUNTER — TRANSITIONAL CARE MANAGEMENT TELEPHONE ENCOUNTER (OUTPATIENT)
Dept: CALL CENTER | Facility: HOSPITAL | Age: 67
End: 2022-07-06

## 2022-07-06 NOTE — OUTREACH NOTE
Call Center TCM Note    Flowsheet Row Responses   [facility] patient discharged from? [facility]   Does the patient have one of the following disease processes/diagnoses(primary or secondary)? Other   TCM attempt successful? Yes   Call start time 1442   Call end time 1446   Discharge diagnosis Chest pain,  Acute renal failure   Person spoke with today (if not patient) and relationship patient   Meds reviewed with patient/caregiver? Yes  [New: aspirin and atorvastatin. ]   Does the patient have all medications ordered at discharge? Yes   Is the patient taking all medications as directed (includes completed medication regime)? Yes   Does the patient have a primary care provider?  Yes   Does the patient have an appointment with their PCP within 7 days of discharge? Greater than 7 days   Comments regarding PCP PCP Casey Mosley MD. Hospital follow up scheduled for Wed 7/13/22  345pm   What is preventing the patient from scheduling follow up appointments within 7 days of discharge? Earlier appointment not available   Nursing Interventions Verified appointment date/time/provider   Has the patient kept scheduled appointments due by today? N/A   Has home health visited the patient within 72 hours of discharge? N/A   Psychosocial issues? No   Did the patient receive a copy of their discharge instructions? Yes   Nursing interventions Reviewed instructions with patient   What is the patient's perception of their health status since discharge? Improving  [Patient any chest pain since discharge. Reports feeling well today. ]   Is the patient/caregiver able to teach back signs and symptoms related to disease process for when to call PCP? Yes   Is the patient/caregiver able to teach back signs and symptoms related to disease process for when to call 911? Yes   Is the patient/caregiver able to teach back the hierarchy of who to call/visit for symptoms/problems? PCP, Specialist, Home health nurse, Urgent Care, ED, 911 Yes   If the  patient is a current smoker, are they able to teach back resources for cessation? Not a smoker   TCM call completed? Yes   Wrap up additional comments Denies further questions or needs today.           Almaz Doss RN    7/6/2022, 14:47 EDT

## 2022-07-13 ENCOUNTER — OFFICE VISIT (OUTPATIENT)
Dept: FAMILY MEDICINE CLINIC | Facility: CLINIC | Age: 67
End: 2022-07-13

## 2022-07-13 VITALS
WEIGHT: 168.9 LBS | DIASTOLIC BLOOD PRESSURE: 82 MMHG | RESPIRATION RATE: 19 BRPM | HEIGHT: 62 IN | OXYGEN SATURATION: 98 % | SYSTOLIC BLOOD PRESSURE: 150 MMHG | TEMPERATURE: 98.4 F | HEART RATE: 88 BPM | BODY MASS INDEX: 31.08 KG/M2

## 2022-07-13 DIAGNOSIS — Z09 HOSPITAL DISCHARGE FOLLOW-UP: Primary | ICD-10-CM

## 2022-07-13 DIAGNOSIS — R07.89 OTHER CHEST PAIN: ICD-10-CM

## 2022-07-13 DIAGNOSIS — I10 PRIMARY HYPERTENSION: ICD-10-CM

## 2022-07-13 DIAGNOSIS — J43.9 PULMONARY EMPHYSEMA, UNSPECIFIED EMPHYSEMA TYPE: ICD-10-CM

## 2022-07-13 DIAGNOSIS — K21.9 GASTROESOPHAGEAL REFLUX DISEASE WITHOUT ESOPHAGITIS: ICD-10-CM

## 2022-07-13 PROCEDURE — 99214 OFFICE O/P EST MOD 30 MIN: CPT | Performed by: STUDENT IN AN ORGANIZED HEALTH CARE EDUCATION/TRAINING PROGRAM

## 2022-07-13 RX ORDER — BUDESONIDE, GLYCOPYRROLATE, AND FORMOTEROL FUMARATE 160; 9; 4.8 UG/1; UG/1; UG/1
AEROSOL, METERED RESPIRATORY (INHALATION)
COMMUNITY
Start: 2022-07-09 | End: 2022-08-02

## 2022-07-13 RX ORDER — LISINOPRIL AND HYDROCHLOROTHIAZIDE 20; 12.5 MG/1; MG/1
1 TABLET ORAL DAILY
Qty: 90 TABLET | Refills: 1 | Status: SHIPPED | OUTPATIENT
Start: 2022-07-13 | End: 2022-11-18 | Stop reason: SDUPTHER

## 2022-07-13 NOTE — PROGRESS NOTES
"Chief Complaint  Hospital discharge follow-up/chest pain    Subjective         Jonathan Guerra is a 66 y.o. male who presents to [facility]  She is a 6 years old male comes to the clinic today to follow-up on recent hospitalization.    Patient was hospitalized for evaluation and treatment of chest pain/to rule out ACS.  Patient was evaluated by cardiologist, had stress test with normal ejection fraction and findings.  Patient was discharged home after improved symptoms.    Patient reports no symptoms since discharge.  Reports good appetite/no shortness of breath or any GI symptoms.    Hypertension; patient is taking metoprolol/lisinopril/hydrochlorothiazide.  Still not stable blood pressure    Physically active patient.    Reports improved COPD on Breztri.    Review of Systems   Objective   Vital Signs:   Vitals:    07/13/22 1530   BP: 150/82   Pulse: 88   Resp: 19   Temp: 98.4 °F (36.9 °C)   SpO2: 98%   Weight: 76.6 kg (168 lb 14.4 oz)   Height: 157.5 cm (62\")      Body mass index is 30.89 kg/m².   Physical Exam  Vitals reviewed.   Constitutional:       Appearance: Normal appearance. He is well-developed.   HENT:      Head: Normocephalic and atraumatic.      Right Ear: External ear normal.      Left Ear: External ear normal.      Mouth/Throat:      Pharynx: No oropharyngeal exudate.   Eyes:      Conjunctiva/sclera: Conjunctivae normal.      Pupils: Pupils are equal, round, and reactive to light.   Cardiovascular:      Rate and Rhythm: Normal rate and regular rhythm.      Heart sounds: No murmur heard.    No friction rub. No gallop.   Pulmonary:      Effort: Pulmonary effort is normal.      Breath sounds: Normal breath sounds. No wheezing or rhonchi.   Abdominal:      General: Bowel sounds are normal. There is no distension.      Palpations: Abdomen is soft.      Tenderness: There is no abdominal tenderness.   Skin:     General: Skin is warm and dry.   Neurological:      Mental " Status: He is alert and oriented to person, place, and time.      Cranial Nerves: No cranial nerve deficit.   Psychiatric:         Mood and Affect: Mood and affect normal.         Behavior: Behavior normal.         Thought Content: Thought content normal.         Judgment: Judgment normal.                       Assessment and Plan   Diagnoses and all orders for this visit:    1. Hospital discharge follow-up (Primary)  Comments:  Hospital course/labs/imaging work-up reviewed, cardiac consult reviewed and appreciated    2. Other chest pain  Comments:  Resolved, normal stress test in the hospital.  Patient reports no symptoms since hospital discharge    3. Primary hypertension  Comments:  Mildly uncontrolled, will increase lisinopril to 20 mg and send lisinopril/hydrochlorothiazide combination pill.  Orders:  -     lisinopril-hydrochlorothiazide (PRINZIDE,ZESTORETIC) 20-12.5 MG per tablet; Take 1 tablet by mouth Daily.  Dispense: 90 tablet; Refill: 1    4. Gastroesophageal reflux disease without esophagitis  Comments:  Controlled symptoms on omeprazole    5. Pulmonary emphysema, unspecified emphysema type (HCC)  Comments:  Improved symptoms on [facility]            Follow Up   Return in about 5 months (around 12/13/2022).  Patient was given instructions and counseling regarding his condition or for health maintenance advice. Please see specific information pulled into the AVS if appropriate.

## 2022-07-23 ENCOUNTER — HOSPITAL ENCOUNTER (EMERGENCY)
Facility: HOSPITAL | Age: 67
Discharge: HOME | End: 2022-07-23
Attending: STUDENT IN AN ORGANIZED HEALTH CARE EDUCATION/TRAINING PROGRAM | Admitting: STUDENT IN AN ORGANIZED HEALTH CARE EDUCATION/TRAINING PROGRAM

## 2022-07-23 ENCOUNTER — APPOINTMENT (OUTPATIENT)
Dept: GENERAL RADIOLOGY | Facility: HOSPITAL | Age: 67
End: 2022-07-23

## 2022-07-23 VITALS
HEIGHT: 62 IN | BODY MASS INDEX: 29.78 KG/M2 | RESPIRATION RATE: 20 BRPM | SYSTOLIC BLOOD PRESSURE: 148 MMHG | DIASTOLIC BLOOD PRESSURE: 68 MMHG | WEIGHT: 161.82 LBS | TEMPERATURE: 99.5 F | OXYGEN SATURATION: 93 % | HEART RATE: 95 BPM

## 2022-07-23 DIAGNOSIS — R06.02 SHORTNESS OF BREATH: ICD-10-CM

## 2022-07-23 DIAGNOSIS — R74.8 ELEVATED CREATINE KINASE: Primary | ICD-10-CM

## 2022-07-23 LAB
ALBUMIN SERPL-MCNC: 4.5 G/DL (ref 3.5–5.2)
ALBUMIN/GLOB SERPL: 1.7 G/DL
ALP SERPL-CCNC: 176 U/L (ref 39–117)
ALT SERPL W P-5'-P-CCNC: 32 U/L (ref 1–41)
ANION GAP SERPL CALCULATED.3IONS-SCNC: 13.4 MMOL/L (ref 5–15)
AST SERPL-CCNC: 29 U/L (ref 1–40)
BASOPHILS # BLD AUTO: 0.04 10*3/MM3 (ref 0–0.2)
BASOPHILS NFR BLD AUTO: 0.4 % (ref 0–1.5)
BILIRUB SERPL-MCNC: 0.5 MG/DL (ref 0–1.2)
BUN SERPL-MCNC: 30 MG/DL (ref 8–23)
BUN/CREAT SERPL: 14.6 (ref 7–25)
CALCIUM SPEC-SCNC: 9.5 MG/DL (ref 8.6–10.5)
CHLORIDE SERPL-SCNC: 97 MMOL/L (ref 98–107)
CO2 SERPL-SCNC: 21.6 MMOL/L (ref 22–29)
CREAT SERPL-MCNC: 2.05 MG/DL (ref 0.76–1.27)
D DIMER PPP FEU-MCNC: 0.3 MCGFEU/ML (ref 0–0.57)
DEPRECATED RDW RBC AUTO: 42.1 FL (ref 37–54)
EGFRCR SERPLBLD CKD-EPI 2021: 35.5 ML/MIN/1.73
EOSINOPHIL # BLD AUTO: 0.21 10*3/MM3 (ref 0–0.4)
EOSINOPHIL NFR BLD AUTO: 2.1 % (ref 0.3–6.2)
ERYTHROCYTE [DISTWIDTH] IN BLOOD BY AUTOMATED COUNT: 12.3 % (ref 12.3–15.4)
FLUAV AG NPH QL: NEGATIVE
FLUBV AG NPH QL IA: NEGATIVE
GLOBULIN UR ELPH-MCNC: 2.7 GM/DL
GLUCOSE SERPL-MCNC: 124 MG/DL (ref 65–99)
HCT VFR BLD AUTO: 44.5 % (ref 37.5–51)
HGB BLD-MCNC: 15.1 G/DL (ref 13–17.7)
HOLD SPECIMEN: NORMAL
HOLD SPECIMEN: NORMAL
IMM GRANULOCYTES # BLD AUTO: 0.04 10*3/MM3 (ref 0–0.05)
IMM GRANULOCYTES NFR BLD AUTO: 0.4 % (ref 0–0.5)
LYMPHOCYTES # BLD AUTO: 1.14 10*3/MM3 (ref 0.7–3.1)
LYMPHOCYTES NFR BLD AUTO: 11.5 % (ref 19.6–45.3)
MCH RBC QN AUTO: 31.3 PG (ref 26.6–33)
MCHC RBC AUTO-ENTMCNC: 33.9 G/DL (ref 31.5–35.7)
MCV RBC AUTO: 92.1 FL (ref 79–97)
MONOCYTES # BLD AUTO: 1.65 10*3/MM3 (ref 0.1–0.9)
MONOCYTES NFR BLD AUTO: 16.6 % (ref 5–12)
NEUTROPHILS NFR BLD AUTO: 6.83 10*3/MM3 (ref 1.7–7)
NEUTROPHILS NFR BLD AUTO: 69 % (ref 42.7–76)
NRBC BLD AUTO-RTO: 0 /100 WBC (ref 0–0.2)
NT-PROBNP SERPL-MCNC: 67.8 PG/ML (ref 0–900)
PLATELET # BLD AUTO: 215 10*3/MM3 (ref 140–450)
PMV BLD AUTO: 11.9 FL (ref 6–12)
POTASSIUM SERPL-SCNC: 4.4 MMOL/L (ref 3.5–5.2)
PROT SERPL-MCNC: 7.2 G/DL (ref 6–8.5)
RBC # BLD AUTO: 4.83 10*6/MM3 (ref 4.14–5.8)
SODIUM SERPL-SCNC: 132 MMOL/L (ref 136–145)
TROPONIN T SERPL-MCNC: <0.01 NG/ML (ref 0–0.03)
WBC NRBC COR # BLD: 9.91 10*3/MM3 (ref 3.4–10.8)
WHOLE BLOOD HOLD COAG: NORMAL
WHOLE BLOOD HOLD SPECIMEN: NORMAL

## 2022-07-23 PROCEDURE — 87804 INFLUENZA ASSAY W/OPTIC: CPT | Performed by: STUDENT IN AN ORGANIZED HEALTH CARE EDUCATION/TRAINING PROGRAM

## 2022-07-23 PROCEDURE — 93005 ELECTROCARDIOGRAM TRACING: CPT | Performed by: STUDENT IN AN ORGANIZED HEALTH CARE EDUCATION/TRAINING PROGRAM

## 2022-07-23 PROCEDURE — 84484 ASSAY OF TROPONIN QUANT: CPT

## 2022-07-23 PROCEDURE — C9803 HOPD COVID-19 SPEC COLLECT: HCPCS | Performed by: STUDENT IN AN ORGANIZED HEALTH CARE EDUCATION/TRAINING PROGRAM

## 2022-07-23 PROCEDURE — 99283 EMERGENCY DEPT VISIT LOW MDM: CPT

## 2022-07-23 PROCEDURE — 94640 AIRWAY INHALATION TREATMENT: CPT

## 2022-07-23 PROCEDURE — 25010000002 METHYLPREDNISOLONE PER 125 MG: Performed by: STUDENT IN AN ORGANIZED HEALTH CARE EDUCATION/TRAINING PROGRAM

## 2022-07-23 PROCEDURE — 93005 ELECTROCARDIOGRAM TRACING: CPT

## 2022-07-23 PROCEDURE — 83880 ASSAY OF NATRIURETIC PEPTIDE: CPT

## 2022-07-23 PROCEDURE — 85025 COMPLETE CBC W/AUTO DIFF WBC: CPT

## 2022-07-23 PROCEDURE — U0004 COV-19 TEST NON-CDC HGH THRU: HCPCS | Performed by: STUDENT IN AN ORGANIZED HEALTH CARE EDUCATION/TRAINING PROGRAM

## 2022-07-23 PROCEDURE — 96374 THER/PROPH/DIAG INJ IV PUSH: CPT

## 2022-07-23 PROCEDURE — 80053 COMPREHEN METABOLIC PANEL: CPT

## 2022-07-23 PROCEDURE — 85379 FIBRIN DEGRADATION QUANT: CPT | Performed by: STUDENT IN AN ORGANIZED HEALTH CARE EDUCATION/TRAINING PROGRAM

## 2022-07-23 PROCEDURE — 93010 ELECTROCARDIOGRAM REPORT: CPT | Performed by: INTERNAL MEDICINE

## 2022-07-23 PROCEDURE — 71045 X-RAY EXAM CHEST 1 VIEW: CPT

## 2022-07-23 PROCEDURE — 36415 COLL VENOUS BLD VENIPUNCTURE: CPT

## 2022-07-23 RX ORDER — IPRATROPIUM BROMIDE AND ALBUTEROL SULFATE 2.5; .5 MG/3ML; MG/3ML
3 SOLUTION RESPIRATORY (INHALATION) ONCE
Status: COMPLETED | OUTPATIENT
Start: 2022-07-23 | End: 2022-07-23

## 2022-07-23 RX ORDER — PREDNISONE 20 MG/1
40 TABLET ORAL DAILY
Qty: 10 TABLET | Refills: 0 | Status: SHIPPED | OUTPATIENT
Start: 2022-07-23 | End: 2022-07-28

## 2022-07-23 RX ORDER — METHYLPREDNISOLONE SODIUM SUCCINATE 125 MG/2ML
125 INJECTION, POWDER, LYOPHILIZED, FOR SOLUTION INTRAMUSCULAR; INTRAVENOUS ONCE
Status: COMPLETED | OUTPATIENT
Start: 2022-07-23 | End: 2022-07-23

## 2022-07-23 RX ADMIN — SODIUM CHLORIDE 1000 ML: 9 INJECTION, SOLUTION INTRAVENOUS at 16:57

## 2022-07-23 RX ADMIN — METHYLPREDNISOLONE SODIUM SUCCINATE 125 MG: 125 INJECTION, POWDER, FOR SOLUTION INTRAMUSCULAR; INTRAVENOUS at 19:24

## 2022-07-23 RX ADMIN — IPRATROPIUM BROMIDE AND ALBUTEROL SULFATE 3 ML: .5; 3 SOLUTION RESPIRATORY (INHALATION) at 17:41

## 2022-07-23 NOTE — DISCHARGE INSTRUCTIONS
Follow-up with your primary care doctor in the next 3 days to have your kidney function checked.  Watch your symptoms closely at home.  If you have return of chest pain, worsening shortness of breath, or passout, return to the emergency department.

## 2022-07-23 NOTE — ED PROVIDER NOTES
"Time: 5:10 PM EDT  Arrived by: private car  Chief Complaint: Shortness of Breath, Chest Pain  History provided by: Patient  History is limited by: Not limited    History of Present Illness:  Patient is a 66 y.o. year old male who presents to the emergency department with shortness of breath and  chest pain that started yesterday. Pt states that he began to have some shortness of breath that was worse than his baseline SOB and it persisted without any relief. PT also states that the chest pain he has was like a \"twinge\" in his chest that has waxed and waned. Pt denies any heart issues or blood thinner usage.  Patient has a history of COPD.        Similar Symptoms Previously: No   Recently seen: No      Patient Care Team  Primary Care Provider: Casey Mosley MD    Past Medical History:     Allergies   Allergen Reactions   • Azithromycin Unknown - High Severity   • Codeine Unknown - High Severity   • Penicillins Unknown - High Severity     Past Medical History:   Diagnosis Date   • Acid reflux    • Acid reflux disease    • Anemia, unspecified    • Anxiety    • Arthritis    • Asthma    • Broken bones    • Calcaneus fracture, left    • Chronic bronchitis (HCC)    • COPD (chronic obstructive pulmonary disease) (HCC)    • Depression    • Diverticulitis    • Emphysema lung (HCC)    • HBP (high blood pressure)    • Head injury     skull fracture    • Hernia cerebri (HCC)    • HTN (hypertension)    • Hyperlipemia    • Lung disease      Past Surgical History:   Procedure Laterality Date   • COLONOSCOPY  2014, 2019   • ENDOSCOPY       Family History   Problem Relation Age of Onset   • Other Mother         Thyroid Gland Neoplasm   • Heart disease Mother    • Colon cancer Mother    • Diabetes Sister    • Colon cancer Paternal Grandmother        Home Medications:  Prior to Admission medications    Medication Sig Start Date End Date Taking? Authorizing Provider   albuterol sulfate  (90 Base) MCG/ACT inhaler Inhale 2 puffs 4 " (Four) Times a Day. 22   Griffin Doshi MD   aspirin 81 MG EC tablet Take 1 tablet by mouth Daily for 30 days. 22  Griffin Doshi MD   atorvastatin (LIPITOR) 80 MG tablet Take 1 tablet by mouth Every Night for 30 days. 7/4/22 8/3/22  Griffin Doshi MD   Breztri Aerosphere 160-9-4.8 MCG/ACT aerosol inhaler  22   Dillon Mart MD   cetirizine (zyrTEC) 10 MG tablet TAKE ONE TABLET BY MOUTH ONCE DAILY 22   Jin Green MD   lisinopril-hydrochlorothiazide (PRINZIDE,ZESTORETIC) 20-12.5 MG per tablet Take 1 tablet by mouth Daily. 22   Casey Mosley MD   metoprolol succinate XL (Toprol XL) 25 MG 24 hr tablet Take 1 tablet by mouth 2 (two) times a day. 21   Jin Green MD   omeprazole (priLOSEC) 20 MG capsule Take 1 capsule by mouth Daily. 22   Casey Mosley MD        Social History:   Social History     Tobacco Use   • Smoking status: Former Smoker     Packs/day: 1.50     Years: 40.00     Pack years: 60.00     Types: Cigarettes     Quit date:      Years since quittin.5   • Smokeless tobacco: Never Used   Substance Use Topics   • Alcohol use: Never     Comment: Ocassional    • Drug use: Never     Recent travel: not applicable     Review of Systems:  Review of Systems   Constitutional: Negative for chills and fever.   HENT: Negative for congestion, rhinorrhea and sore throat.    Eyes: Negative for pain and visual disturbance.   Respiratory: Positive for shortness of breath. Negative for apnea, cough and chest tightness.    Cardiovascular: Positive for chest pain. Negative for palpitations.   Gastrointestinal: Negative for abdominal pain, diarrhea, nausea and vomiting.   Genitourinary: Negative for difficulty urinating and dysuria.   Musculoskeletal: Negative for joint swelling and myalgias.   Skin: Negative for color change.   Neurological: Negative for seizures and headaches.   Psychiatric/Behavioral: Negative.    All other systems reviewed and are  "negative.       Physical Exam:  /68   Pulse 95   Temp 99.5 °F (37.5 °C) (Oral)   Resp 20   Ht 157.5 cm (62\")   Wt 73.4 kg (161 lb 13.1 oz)   SpO2 93%   BMI 29.60 kg/m²     Physical Exam  Vitals and nursing note reviewed.   Constitutional:       General: He is not in acute distress.     Appearance: Normal appearance. He is not toxic-appearing.   HENT:      Head: Normocephalic and atraumatic.      Jaw: There is normal jaw occlusion.   Eyes:      General: Lids are normal.      Extraocular Movements: Extraocular movements intact.      Conjunctiva/sclera: Conjunctivae normal.      Pupils: Pupils are equal, round, and reactive to light.   Cardiovascular:      Rate and Rhythm: Normal rate and regular rhythm.      Pulses: Normal pulses.      Heart sounds: Normal heart sounds.   Pulmonary:      Effort: Pulmonary effort is normal. No respiratory distress.      Breath sounds: Decreased breath sounds present. No wheezing or rhonchi.   Abdominal:      General: Abdomen is flat.      Palpations: Abdomen is soft.      Tenderness: There is no abdominal tenderness. There is no guarding or rebound.   Musculoskeletal:         General: Normal range of motion.      Cervical back: Normal range of motion and neck supple.      Right lower leg: No edema.      Left lower leg: No edema.   Skin:     General: Skin is warm and dry.   Neurological:      Mental Status: He is alert and oriented to person, place, and time. Mental status is at baseline.   Psychiatric:         Mood and Affect: Mood normal.                Medications in the Emergency Department:  Medications   sodium chloride 0.9 % bolus 1,000 mL (0 mL Intravenous Stopped 7/23/22 1900)   ipratropium-albuterol (DUO-NEB) nebulizer solution 3 mL (3 mL Nebulization Given 7/23/22 1741)   methylPREDNISolone sodium succinate (SOLU-Medrol) injection 125 mg (125 mg Intravenous Given 7/23/22 1924)        Labs  Lab Results (last 24 hours)     Procedure Component Value Units Date/Time "    CBC & Differential [[id number]]  (Abnormal) Collected: 07/23/22 1515    Specimen: Blood from Arm, Left Updated: 07/23/22 1532    Narrative:      The following orders were created for panel order CBC & Differential.  Procedure                               Abnormality         Status                     ---------                               -----------         ------                     CBC Auto Differential[[id number]]        Abnormal            Final result                 Please view results for these tests on the individual orders.    Comprehensive Metabolic Panel [[id number]]  (Abnormal) Collected: 07/23/22 1515    Specimen: Blood from Arm, Left Updated: 07/23/22 1556     Glucose 124 mg/dL      BUN 30 mg/dL      Creatinine 2.05 mg/dL      Sodium 132 mmol/L      Potassium 4.4 mmol/L      Chloride 97 mmol/L      CO2 21.6 mmol/L      Calcium 9.5 mg/dL      Total Protein 7.2 g/dL      Albumin 4.50 g/dL      ALT (SGPT) 32 U/L      AST (SGOT) 29 U/L      Alkaline Phosphatase 176 U/L      Total Bilirubin 0.5 mg/dL      Globulin 2.7 gm/dL      A/G Ratio 1.7 g/dL      BUN/Creatinine Ratio 14.6     Anion Gap 13.4 mmol/L      eGFR 35.5 mL/min/1.73      Comment: National Kidney Foundation and American Society of Nephrology (ASN) Task Force recommended calculation based on the Chronic Kidney Disease Epidemiology Collaboration (CKD-EPI) equation refit without adjustment for race.       Narrative:      GFR Normal >60  Chronic Kidney Disease <60  Kidney Failure <15      BNP [[id number]]  (Normal) Collected: 07/23/22 1515    Specimen: Blood from Arm, Left Updated: 07/23/22 1554     proBNP 67.8 pg/mL     Narrative:      Among patients with dyspnea, NT-proBNP is highly sensitive for the detection of acute congestive heart failure. In addition NT-proBNP of <300 pg/ml effectively rules out acute congestive heart failure with 99% negative predictive value.    Results may be falsely decreased if patient taking Biotin.      Troponin  [[id number]]  (Normal) Collected: 07/23/22 1515    Specimen: Blood from Arm, Left Updated: 07/23/22 1556     Troponin T <0.010 ng/mL     Narrative:      Troponin T Reference Range:  <= 0.03 ng/mL-   Negative for AMI  >0.03 ng/mL-     Abnormal for myocardial necrosis.  Clinicians would have to utilize clinical acumen, EKG, Troponin and serial changes to determine if it is an Acute Myocardial Infarction or myocardial injury due to an underlying chronic condition.       Results may be falsely decreased if patient taking Biotin.      CBC Auto Differential [[id number]]  (Abnormal) Collected: 07/23/22 1515    Specimen: Blood from Arm, Left Updated: 07/23/22 1532     WBC 9.91 10*3/mm3      RBC 4.83 10*6/mm3      Hemoglobin 15.1 g/dL      Hematocrit 44.5 %      MCV 92.1 fL      MCH 31.3 pg      MCHC 33.9 g/dL      RDW 12.3 %      RDW-SD 42.1 fl      MPV 11.9 fL      Platelets 215 10*3/mm3      Neutrophil % 69.0 %      Lymphocyte % 11.5 %      Monocyte % 16.6 %      Eosinophil % 2.1 %      Basophil % 0.4 %      Immature Grans % 0.4 %      Neutrophils, Absolute 6.83 10*3/mm3      Lymphocytes, Absolute 1.14 10*3/mm3      Monocytes, Absolute 1.65 10*3/mm3      Eosinophils, Absolute 0.21 10*3/mm3      Basophils, Absolute 0.04 10*3/mm3      Immature Grans, Absolute 0.04 10*3/mm3      nRBC 0.0 /100 WBC     D-dimer, Quantitative [[id number]]  (Normal) Collected: 07/23/22 1515    Specimen: Blood from Arm, Left Updated: 07/23/22 1800     D-Dimer, Quantitative 0.30 MCGFEU/mL     Narrative:      The Stago D-Dimer test used in conjunction with a clinical pretest probability (PTP) assessment model, has been approved by the FDA to rule out the presence of venous thromboembolism (VTE) in outpatients suspected of deep venous thrombosis (DVT) or pulmonary embolism (PE). The cut-off for negative predictive value is <0.50 MCGFEU/mL.    Influenza Antigen, Rapid - Swab, Nasopharynx [[id number]]  (Normal) Collected: 07/23/22 1805    Specimen: Swab  from Nasopharynx Updated: 07/23/22 1907     Influenza A Ag, EIA Negative     Influenza B Ag, EIA Negative    COVID-19,APTIMA PANTHER(DORIE),[facility], NP/OP SWAB IN UTM/VTM/[facility] TRANSPORT MEDIA,24 HR TAT - Swab, Nasal Cavity [[id number]] Collected: 07/23/22 1805    Specimen: Swab from Nasal Cavity Updated: 07/23/22 1828           Imaging:  XR Chest 1 View    Result Date: 7/23/2022  PROCEDURE: XR CHEST 1 VW  COMPARISON: [facility], CR, XR CHEST 1 VW, 7/03/2022, 14:43.  INDICATIONS: INTERMITTENT DIZZINESS  FINDINGS:  LUNGS: Normal.  No significant pulmonary parenchymal abnormalities.  VASCULATURE: Normal.  Unremarkable pulmonary vasculature.  CARDIAC: Normal.  No cardiac silhouette abnormality or cardiomegaly.  MEDIASTINUM: Normal.  No visible mass or adenopathy.  PLEURA: Normal.  No effusion or pleural thickening.  BONES: Normal.  No fracture or visible bony lesion.  OTHER: Negative.         1. No acute cardiopulmonary disease       Reece Sharif M.D.       Electronically Signed and Approved By: Reece Sharif M.D. on 7/23/2022 at 17:20               Procedures:  Procedures    Progress                            Medical Decision Making:  MDM  Number of Diagnoses or Management Options  Elevated creatine kinase  Shortness of breath  Diagnosis management comments: ddx: Pneumonia, pneumothorax, ACS, PE, asthma, COPD    Influenza negative D-dimer low low suspicion for PE.  Troponin negative.  Last chest pain was night before and only lasted a few minutes noncardiac sounding.  Creatinine elevated at 2 with a baseline around 1.  Discussed with him that he needs to follow-up with his physician this week to repeat his creatinine.  Slightly diminished breath sounds.  Patient given breathing treatment and steroids.  Oxygenating 93% on room air.  CBC shows, no leukocytosis, hemoglobin within normal limits, normal platelets, and no other concerning findings.  Chest x-ray negative.  EKG shows multi ventricular  premature complexes.  QRS 73  heart rate 108.       Amount and/or Complexity of Data Reviewed  Clinical lab tests: reviewed  Tests in the radiology section of CPT®: reviewed         Final diagnoses:   Elevated creatine kinase   Shortness of breath        Disposition:  ED Disposition     ED Disposition   Discharge    Condition   Stable    Comment   --             This medical record created using voice recognition software.           Sam Cortes Jr.  07/23/22 1747       Mony Rosario MD  07/24/22 0142

## 2022-07-24 LAB
QT INTERVAL: 302 MS
SARS-COV-2 RNA PNL SPEC NAA+PROBE: DETECTED

## 2022-07-24 NOTE — PROGRESS NOTES
Discussed positive COVID results with shyann Guerra 1955 using COVID-19 results scripting. Educated on the CDC guidance for quarantining. Advised patient to follow up with the PCP if symptoms worsen or medical treatment is needed. Advised to go to the ED if emergent needs arise. Addressed all questions and concerns.

## 2022-08-02 RX ORDER — BUDESONIDE, GLYCOPYRROLATE, AND FORMOTEROL FUMARATE 160; 9; 4.8 UG/1; UG/1; UG/1
AEROSOL, METERED RESPIRATORY (INHALATION)
Qty: 10.7 G | Refills: 1 | Status: SHIPPED | OUTPATIENT
Start: 2022-08-02 | End: 2022-10-31

## 2022-08-10 ENCOUNTER — LAB (OUTPATIENT)
Dept: LAB | Facility: HOSPITAL | Age: 67
End: 2022-08-10

## 2022-08-10 ENCOUNTER — OFFICE VISIT (OUTPATIENT)
Dept: FAMILY MEDICINE CLINIC | Facility: CLINIC | Age: 67
End: 2022-08-10

## 2022-08-10 VITALS
RESPIRATION RATE: 19 BRPM | BODY MASS INDEX: 30.29 KG/M2 | WEIGHT: 164.6 LBS | HEART RATE: 100 BPM | TEMPERATURE: 97.8 F | HEIGHT: 62 IN | DIASTOLIC BLOOD PRESSURE: 76 MMHG | SYSTOLIC BLOOD PRESSURE: 118 MMHG | OXYGEN SATURATION: 96 %

## 2022-08-10 DIAGNOSIS — E87.8 ABNORMAL BLOOD ELECTROLYTE LEVEL: ICD-10-CM

## 2022-08-10 DIAGNOSIS — U07.1 COVID-19: ICD-10-CM

## 2022-08-10 DIAGNOSIS — N17.9 AKI (ACUTE KIDNEY INJURY): ICD-10-CM

## 2022-08-10 DIAGNOSIS — K21.9 GASTROESOPHAGEAL REFLUX DISEASE WITHOUT ESOPHAGITIS: ICD-10-CM

## 2022-08-10 DIAGNOSIS — N17.9 AKI (ACUTE KIDNEY INJURY): Primary | ICD-10-CM

## 2022-08-10 LAB
ANION GAP SERPL CALCULATED.3IONS-SCNC: 13.4 MMOL/L (ref 5–15)
BUN SERPL-MCNC: 17 MG/DL (ref 8–23)
BUN/CREAT SERPL: 11.5 (ref 7–25)
CALCIUM SPEC-SCNC: 9.4 MG/DL (ref 8.6–10.5)
CHLORIDE SERPL-SCNC: 101 MMOL/L (ref 98–107)
CO2 SERPL-SCNC: 23.6 MMOL/L (ref 22–29)
CREAT SERPL-MCNC: 1.48 MG/DL (ref 0.76–1.27)
EGFRCR SERPLBLD CKD-EPI 2021: 52.5 ML/MIN/1.73
GLUCOSE SERPL-MCNC: 86 MG/DL (ref 65–99)
POTASSIUM SERPL-SCNC: 4 MMOL/L (ref 3.5–5.2)
SODIUM SERPL-SCNC: 138 MMOL/L (ref 136–145)

## 2022-08-10 PROCEDURE — 80048 BASIC METABOLIC PNL TOTAL CA: CPT

## 2022-08-10 PROCEDURE — 99214 OFFICE O/P EST MOD 30 MIN: CPT | Performed by: STUDENT IN AN ORGANIZED HEALTH CARE EDUCATION/TRAINING PROGRAM

## 2022-08-10 PROCEDURE — 36415 COLL VENOUS BLD VENIPUNCTURE: CPT

## 2022-08-10 RX ORDER — OMEPRAZOLE 20 MG/1
20 CAPSULE, DELAYED RELEASE ORAL DAILY
Qty: 90 CAPSULE | Refills: 3 | Status: SHIPPED | OUTPATIENT
Start: 2022-08-10

## 2022-08-10 NOTE — PROGRESS NOTES
"Chief Complaint  Following up on COVID-19 and abnormal blood work    Subjective         Jonathan Guerra is a 66 y.o. male who presents to [facility]  66 years old male comes to the clinic today to follow-up on recent ER visits due to COVID-19.    Patient visited ER and found to have COVID-19, patient was also found to have abnormal electrolytes and kidney function and asked to follow-up with PMD.    Patient reports no acute symptoms currently, all the symptoms of COVID resolved.  Patient has finished self quarantine.  Denies any breathing issues, reports normal to baseline health.    Patient is tolerating p.o. intake, drinking enough fluids.  Wants refill on GERD medication.    Review of Systems   Objective   Vital Signs:   Vitals:    08/10/22 0947   BP: 118/76   Pulse: 100   Resp: 19   Temp: 97.8 °F (36.6 °C)   SpO2: 96%   Weight: 74.7 kg (164 lb 9.6 oz)   Height: 157.5 cm (62\")      Body mass index is 30.11 kg/m².   Physical Exam  Vitals reviewed.   Constitutional:       Appearance: Normal appearance. He is well-developed.   HENT:      Head: Normocephalic and atraumatic.      Right Ear: External ear normal.      Left Ear: External ear normal.      Mouth/Throat:      Pharynx: No oropharyngeal exudate.   Eyes:      Conjunctiva/sclera: Conjunctivae normal.      Pupils: Pupils are equal, round, and reactive to light.   Cardiovascular:      Rate and Rhythm: Normal rate and regular rhythm.      Heart sounds: No murmur heard.    No friction rub. No gallop.   Pulmonary:      Effort: Pulmonary effort is normal.      Breath sounds: Normal breath sounds. No wheezing or rhonchi.   Abdominal:      General: Bowel sounds are normal. There is no distension.      Palpations: Abdomen is soft.      Tenderness: There is no abdominal tenderness.   Skin:     General: Skin is warm and dry.   Neurological:      Mental Status: He is alert and oriented to person, place, and time.      Cranial Nerves: " No cranial nerve deficit.   Psychiatric:         Mood and Affect: Mood and affect normal.         Behavior: Behavior normal.         Thought Content: Thought content normal.         Judgment: Judgment normal.                       Assessment and Plan   Diagnoses and all orders for this visit:    1. DARIEL (acute kidney injury) (HCC) (Primary)  Comments:  Follow-up needed, repeat blood work ordered  Orders:  -     Basic metabolic panel; Future    2. Gastroesophageal reflux disease without esophagitis  Comments:  Controlled on PPI, diet modifications discussed.  Orders:  -     omeprazole (priLOSEC) 20 MG capsule; Take 1 capsule by mouth Daily.  Dispense: 90 capsule; Refill: 3    3. Abnormal blood electrolyte level  Comments:  Follow-up needed, repeat blood work ordered  Orders:  -     Basic metabolic panel; Future    4. COVID-19  Comments:  S/p COVID-19, resolved symptoms.  Asymptomatic      Recent ER visit reviewed    Follow Up   Return if symptoms worsen or fail to improve.  Patient was given instructions and counseling regarding his condition or for health maintenance advice. Please see specific information pulled into the AVS if appropriate.

## 2022-09-07 RX ORDER — CETIRIZINE HYDROCHLORIDE 10 MG/1
10 TABLET ORAL DAILY
Qty: 30 TABLET | Refills: 3 | Status: SHIPPED | OUTPATIENT
Start: 2022-09-07 | End: 2023-01-03

## 2022-09-29 ENCOUNTER — TRANSCRIBE ORDERS (OUTPATIENT)
Dept: GENERAL RADIOLOGY | Facility: HOSPITAL | Age: 67
End: 2022-09-29

## 2022-09-29 ENCOUNTER — HOSPITAL ENCOUNTER (OUTPATIENT)
Dept: GENERAL RADIOLOGY | Facility: HOSPITAL | Age: 67
Discharge: HOME | End: 2022-09-29
Admitting: INTERNAL MEDICINE

## 2022-09-29 DIAGNOSIS — R91.8 MULTIPLE LUNG NODULES: Primary | ICD-10-CM

## 2022-09-29 DIAGNOSIS — R91.8 MULTIPLE LUNG NODULES: ICD-10-CM

## 2022-09-29 PROCEDURE — 71046 X-RAY EXAM CHEST 2 VIEWS: CPT

## 2022-10-31 RX ORDER — BUDESONIDE, GLYCOPYRROLATE, AND FORMOTEROL FUMARATE 160; 9; 4.8 UG/1; UG/1; UG/1
AEROSOL, METERED RESPIRATORY (INHALATION)
Qty: 10.7 G | Refills: 1 | Status: SHIPPED | OUTPATIENT
Start: 2022-10-31 | End: 2023-02-06

## 2022-11-17 RX ORDER — ATORVASTATIN CALCIUM 80 MG/1
TABLET, FILM COATED ORAL
Qty: 30 TABLET | Refills: 0 | Status: SHIPPED | OUTPATIENT
Start: 2022-11-17 | End: 2022-11-18 | Stop reason: SDUPTHER

## 2022-11-18 ENCOUNTER — OFFICE VISIT (OUTPATIENT)
Dept: FAMILY MEDICINE CLINIC | Facility: CLINIC | Age: 67
End: 2022-11-18

## 2022-11-18 VITALS
WEIGHT: 166.1 LBS | BODY MASS INDEX: 30.57 KG/M2 | HEART RATE: 82 BPM | TEMPERATURE: 97.8 F | DIASTOLIC BLOOD PRESSURE: 82 MMHG | OXYGEN SATURATION: 98 % | SYSTOLIC BLOOD PRESSURE: 120 MMHG | HEIGHT: 62 IN | RESPIRATION RATE: 19 BRPM

## 2022-11-18 DIAGNOSIS — I10 PRIMARY HYPERTENSION: ICD-10-CM

## 2022-11-18 DIAGNOSIS — J43.9 PULMONARY EMPHYSEMA, UNSPECIFIED EMPHYSEMA TYPE: ICD-10-CM

## 2022-11-18 DIAGNOSIS — Z13.6 SCREENING FOR AAA (ABDOMINAL AORTIC ANEURYSM): ICD-10-CM

## 2022-11-18 DIAGNOSIS — Z00.00 ANNUAL PHYSICAL EXAM: Primary | ICD-10-CM

## 2022-11-18 DIAGNOSIS — Z87.891 FORMER SMOKER: ICD-10-CM

## 2022-11-18 PROCEDURE — 99397 PER PM REEVAL EST PAT 65+ YR: CPT | Performed by: STUDENT IN AN ORGANIZED HEALTH CARE EDUCATION/TRAINING PROGRAM

## 2022-11-18 RX ORDER — LISINOPRIL AND HYDROCHLOROTHIAZIDE 20; 12.5 MG/1; MG/1
1 TABLET ORAL DAILY
Qty: 90 TABLET | Refills: 1 | Status: SHIPPED | OUTPATIENT
Start: 2022-11-18

## 2022-11-18 RX ORDER — KETOTIFEN FUMARATE 0.35 MG/ML
SOLUTION/ DROPS OPHTHALMIC
COMMUNITY
Start: 2022-11-07

## 2022-11-18 RX ORDER — ATORVASTATIN CALCIUM 80 MG/1
80 TABLET, FILM COATED ORAL DAILY
Qty: 90 TABLET | Refills: 1 | Status: SHIPPED | OUTPATIENT
Start: 2022-11-18

## 2022-11-18 NOTE — PROGRESS NOTES
"Chief Complaint  Annual physical and follow-up on hypertension/COPD    Subjective         Jonathan Guerra is a 67 y.o. male who presents to [facility]    67 years old comes to the clinic today to follow-up and annual physical.    Hypertension is controlled on lisinopril/hydrochlorothiazide and metoprolol.    COPD is controlled on Breztri.    Denies any chest pain or shortness of breath on exertion.    12+ review of systems are normal.    Objective   Vital Signs:   Vitals:    11/18/22 0902   BP: 120/82   Pulse: 82   Resp: 19   Temp: 97.8 °F (36.6 °C)   SpO2: 98%   Weight: 75.3 kg (166 lb 1.6 oz)   Height: 157.5 cm (62\")      Body mass index is 30.38 kg/m².   Wt Readings from Last 3 Encounters:   11/18/22 75.3 kg (166 lb 1.6 oz)   08/10/22 74.7 kg (164 lb 9.6 oz)   07/23/22 73.4 kg (161 lb 13.1 oz)      BP Readings from Last 3 Encounters:   11/18/22 120/82   08/10/22 118/76   07/23/22 148/68        Patient Care Team:  Casey oMsley MD as PCP - General (Family Medicine)     Physical Exam  Vitals reviewed.   Constitutional:       Appearance: Normal appearance. He is well-developed.   HENT:      Head: Normocephalic and atraumatic.      Right Ear: External ear normal.      Left Ear: External ear normal.      Mouth/Throat:      Pharynx: No oropharyngeal exudate.   Eyes:      Conjunctiva/sclera: Conjunctivae normal.      Pupils: Pupils are equal, round, and reactive to light.   Cardiovascular:      Rate and Rhythm: Normal rate and regular rhythm.      Heart sounds: No murmur heard.    No friction rub. No gallop.   Pulmonary:      Effort: Pulmonary effort is normal.      Breath sounds: Normal breath sounds. No wheezing or rhonchi.   Abdominal:      General: Bowel sounds are normal. There is no distension.      Palpations: Abdomen is soft.      Tenderness: There is no abdominal tenderness.   Skin:     General: Skin is warm and dry.   Neurological:      Mental Status: He is alert and " oriented to person, place, and time.      Cranial Nerves: No cranial nerve deficit.   Psychiatric:         Mood and Affect: Mood and affect normal.         Behavior: Behavior normal.         Thought Content: Thought content normal.         Judgment: Judgment normal.                       Assessment and Plan   Diagnoses and all orders for this visit:    1. Annual physical exam (Primary)  Comments:  Daily exercise and healthy diet recommended  Orders:  -     TSH Rfx On Abnormal To Free T4; Future  -     CBC & Differential; Future  -     Comprehensive Metabolic Panel; Future  -     Hemoglobin A1c; Future  -     Lipid Panel; Future    2. Primary hypertension  Comments:  Mildly uncontrolled, will increase lisinopril to 20 mg and send lisinopril/hydrochlorothiazide combination pill.  Orders:  -     atorvastatin (LIPITOR) 80 MG tablet; Take 1 tablet by mouth Daily.  Dispense: 90 tablet; Refill: 1  -     lisinopril-hydrochlorothiazide (PRINZIDE,ZESTORETIC) 20-12.5 MG per tablet; Take 1 tablet by mouth Daily.  Dispense: 90 tablet; Refill: 1  -     TSH Rfx On Abnormal To Free T4; Future  -     CBC & Differential; Future  -     Comprehensive Metabolic Panel; Future  -     Hemoglobin A1c; Future  -     Lipid Panel; Future    3. Screening for AAA (abdominal aortic aneurysm)  -      AAA Screen Limited; Future  -     TSH Rfx On Abnormal To Free T4; Future  -     CBC & Differential; Future  -     Comprehensive Metabolic Panel; Future  -     Hemoglobin A1c; Future  -     Lipid Panel; Future    4. Pulmonary emphysema, unspecified emphysema type (HCC)  Comments:  Continue with Breztri and albuterol as needed  Orders:  -     TSH Rfx On Abnormal To Free T4; Future  -     CBC & Differential; Future  -     Comprehensive Metabolic Panel; Future  -     Hemoglobin A1c; Future  -     Lipid Panel; Future    5. Former smoker  -     TSH Rfx On Abnormal To Free T4; Future  -     CBC & Differential; Future  -     Comprehensive Metabolic Panel;  Future  -     Hemoglobin A1c; Future  -     Lipid Panel; Future      Last blood work and imaging work-up reviewed.    Tobacco Use: Medium Risk   • Smoking Tobacco Use: Former   • Smokeless Tobacco Use: Never   • Passive Exposure: Not on file            Follow Up   Return in about 6 months (around 5/18/2023) for Next scheduled follow up.  Patient was given instructions and counseling regarding his condition or for health maintenance advice. Please see specific information pulled into the AVS if appropriate.

## 2022-12-07 ENCOUNTER — TELEPHONE (OUTPATIENT)
Dept: FAMILY MEDICINE CLINIC | Facility: CLINIC | Age: 67
End: 2022-12-07

## 2022-12-07 RX ORDER — DOXYCYCLINE HYCLATE 100 MG/1
100 CAPSULE ORAL 2 TIMES DAILY
Qty: 20 CAPSULE | Refills: 0 | Status: SHIPPED | OUTPATIENT
Start: 2022-12-07 | End: 2023-01-24

## 2022-12-07 RX ORDER — PREDNISONE 20 MG/1
40 TABLET ORAL DAILY
Qty: 10 TABLET | Refills: 0 | Status: SHIPPED | OUTPATIENT
Start: 2022-12-07 | End: 2023-01-24

## 2022-12-07 NOTE — TELEPHONE ENCOUNTER
Caller: ALMA ROCHE NOT ON  VERBAL    Relationship: Emergency Contact    Best call back number: [phone]    What medication are you requesting: ANTIBIOTIC    What are your current symptoms: GREEN MUCUS    How long have you been experiencing symptoms: ONE WEEK    Have you had these symptoms before:    [] Yes  [x] No    Have you been treated for these symptoms before:   [] Yes  [x] No    If a prescription is needed, what is your preferred pharmacy and phone number: [facility] [place] [place] - [address] [facility].  [phone] [facility] [phone] [facility]     Additional notes: PATIENT IS COUGHING UP GREEN MUCUS. ALMA IS REQUESTING AN ANTIBIOTIC FOR THE PATIENT. HE DOES NOT HAVE ANY OTHER SYMPTOMS.

## 2022-12-13 ENCOUNTER — HOSPITAL ENCOUNTER (EMERGENCY)
Facility: HOSPITAL | Age: 67
Discharge: LEFT BEFORE DISCHARGE | End: 2022-12-13

## 2022-12-13 ENCOUNTER — APPOINTMENT (OUTPATIENT)
Dept: GENERAL RADIOLOGY | Facility: HOSPITAL | Age: 67
End: 2022-12-13

## 2022-12-13 VITALS
HEART RATE: 102 BPM | TEMPERATURE: 98.6 F | OXYGEN SATURATION: 100 % | DIASTOLIC BLOOD PRESSURE: 70 MMHG | HEIGHT: 61 IN | WEIGHT: 165 LBS | SYSTOLIC BLOOD PRESSURE: 120 MMHG | RESPIRATION RATE: 18 BRPM | BODY MASS INDEX: 31.15 KG/M2

## 2022-12-13 LAB
ALBUMIN SERPL-MCNC: 4.3 G/DL (ref 3.5–5.2)
ALBUMIN/GLOB SERPL: 1.7 G/DL
ALP SERPL-CCNC: 138 U/L (ref 39–117)
ALT SERPL W P-5'-P-CCNC: 36 U/L (ref 1–41)
ANION GAP SERPL CALCULATED.3IONS-SCNC: 13.2 MMOL/L (ref 5–15)
AST SERPL-CCNC: 19 U/L (ref 1–40)
BASOPHILS # BLD AUTO: 0.04 10*3/MM3 (ref 0–0.2)
BASOPHILS NFR BLD AUTO: 0.3 % (ref 0–1.5)
BILIRUB SERPL-MCNC: 0.5 MG/DL (ref 0–1.2)
BUN SERPL-MCNC: 26 MG/DL (ref 8–23)
BUN/CREAT SERPL: 14.4 (ref 7–25)
CALCIUM SPEC-SCNC: 9.3 MG/DL (ref 8.6–10.5)
CHLORIDE SERPL-SCNC: 101 MMOL/L (ref 98–107)
CO2 SERPL-SCNC: 22.8 MMOL/L (ref 22–29)
CREAT SERPL-MCNC: 1.8 MG/DL (ref 0.76–1.27)
DEPRECATED RDW RBC AUTO: 39.3 FL (ref 37–54)
EGFRCR SERPLBLD CKD-EPI 2021: 41.3 ML/MIN/1.73
EOSINOPHIL # BLD AUTO: 0.23 10*3/MM3 (ref 0–0.4)
EOSINOPHIL NFR BLD AUTO: 1.5 % (ref 0.3–6.2)
ERYTHROCYTE [DISTWIDTH] IN BLOOD BY AUTOMATED COUNT: 11.6 % (ref 12.3–15.4)
GLOBULIN UR ELPH-MCNC: 2.6 GM/DL
GLUCOSE SERPL-MCNC: 99 MG/DL (ref 65–99)
HCT VFR BLD AUTO: 41.4 % (ref 37.5–51)
HGB BLD-MCNC: 14.6 G/DL (ref 13–17.7)
HOLD SPECIMEN: NORMAL
HOLD SPECIMEN: NORMAL
IMM GRANULOCYTES # BLD AUTO: 0.17 10*3/MM3 (ref 0–0.05)
IMM GRANULOCYTES NFR BLD AUTO: 1.1 % (ref 0–0.5)
LYMPHOCYTES # BLD AUTO: 2.23 10*3/MM3 (ref 0.7–3.1)
LYMPHOCYTES NFR BLD AUTO: 14.1 % (ref 19.6–45.3)
MCH RBC QN AUTO: 32.7 PG (ref 26.6–33)
MCHC RBC AUTO-ENTMCNC: 35.3 G/DL (ref 31.5–35.7)
MCV RBC AUTO: 92.8 FL (ref 79–97)
MONOCYTES # BLD AUTO: 1.2 10*3/MM3 (ref 0.1–0.9)
MONOCYTES NFR BLD AUTO: 7.6 % (ref 5–12)
NEUTROPHILS NFR BLD AUTO: 11.9 10*3/MM3 (ref 1.7–7)
NEUTROPHILS NFR BLD AUTO: 75.4 % (ref 42.7–76)
NRBC BLD AUTO-RTO: 0 /100 WBC (ref 0–0.2)
NT-PROBNP SERPL-MCNC: 112.1 PG/ML (ref 0–900)
PLATELET # BLD AUTO: 240 10*3/MM3 (ref 140–450)
PMV BLD AUTO: 11.1 FL (ref 6–12)
POTASSIUM SERPL-SCNC: 3.1 MMOL/L (ref 3.5–5.2)
PROT SERPL-MCNC: 6.9 G/DL (ref 6–8.5)
RBC # BLD AUTO: 4.46 10*6/MM3 (ref 4.14–5.8)
SODIUM SERPL-SCNC: 137 MMOL/L (ref 136–145)
TROPONIN T SERPL-MCNC: <0.01 NG/ML (ref 0–0.03)
WBC NRBC COR # BLD: 15.77 10*3/MM3 (ref 3.4–10.8)
WHOLE BLOOD HOLD COAG: NORMAL
WHOLE BLOOD HOLD SPECIMEN: NORMAL

## 2022-12-13 PROCEDURE — 83880 ASSAY OF NATRIURETIC PEPTIDE: CPT

## 2022-12-13 PROCEDURE — 93005 ELECTROCARDIOGRAM TRACING: CPT

## 2022-12-13 PROCEDURE — 93010 ELECTROCARDIOGRAM REPORT: CPT | Performed by: INTERNAL MEDICINE

## 2022-12-13 PROCEDURE — 36415 COLL VENOUS BLD VENIPUNCTURE: CPT

## 2022-12-13 PROCEDURE — 85025 COMPLETE CBC W/AUTO DIFF WBC: CPT

## 2022-12-13 PROCEDURE — 99211 OFF/OP EST MAY X REQ PHY/QHP: CPT

## 2022-12-13 PROCEDURE — 80053 COMPREHEN METABOLIC PANEL: CPT

## 2022-12-13 PROCEDURE — 84484 ASSAY OF TROPONIN QUANT: CPT

## 2022-12-13 RX ORDER — SODIUM CHLORIDE 0.9 % (FLUSH) 0.9 %
10 SYRINGE (ML) INJECTION AS NEEDED
Status: DISCONTINUED | OUTPATIENT
Start: 2022-12-13 | End: 2022-12-13 | Stop reason: HOSPADM

## 2022-12-14 ENCOUNTER — HOSPITAL ENCOUNTER (OUTPATIENT)
Dept: ULTRASOUND IMAGING | Facility: HOSPITAL | Age: 67
Discharge: HOME | End: 2022-12-14
Admitting: STUDENT IN AN ORGANIZED HEALTH CARE EDUCATION/TRAINING PROGRAM

## 2022-12-14 DIAGNOSIS — Z13.6 SCREENING FOR AAA (ABDOMINAL AORTIC ANEURYSM): ICD-10-CM

## 2022-12-14 LAB — QT INTERVAL: 352 MS

## 2022-12-14 PROCEDURE — 76706 US ABDL AORTA SCREEN AAA: CPT

## 2022-12-15 ENCOUNTER — OFFICE VISIT (OUTPATIENT)
Dept: FAMILY MEDICINE CLINIC | Facility: CLINIC | Age: 67
End: 2022-12-15

## 2022-12-15 VITALS
HEIGHT: 61 IN | SYSTOLIC BLOOD PRESSURE: 120 MMHG | DIASTOLIC BLOOD PRESSURE: 78 MMHG | RESPIRATION RATE: 19 BRPM | WEIGHT: 165 LBS | TEMPERATURE: 97.8 F | HEART RATE: 85 BPM | BODY MASS INDEX: 31.15 KG/M2 | OXYGEN SATURATION: 98 %

## 2022-12-15 DIAGNOSIS — R55 SYNCOPE, UNSPECIFIED SYNCOPE TYPE: ICD-10-CM

## 2022-12-15 DIAGNOSIS — R42 DIZZINESS: Primary | ICD-10-CM

## 2022-12-15 DIAGNOSIS — R42 VERTIGO: ICD-10-CM
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
## 2022-12-15 PROCEDURE — 99214 OFFICE O/P EST MOD 30 MIN: CPT | Performed by: STUDENT IN AN ORGANIZED HEALTH CARE EDUCATION/TRAINING PROGRAM

## 2022-12-15 PROCEDURE — 93000 ELECTROCARDIOGRAM COMPLETE: CPT | Performed by: STUDENT IN AN ORGANIZED HEALTH CARE EDUCATION/TRAINING PROGRAM

## 2022-12-15 RX ORDER — MECLIZINE HYDROCHLORIDE 25 MG/1
25 TABLET ORAL 3 TIMES DAILY PRN
Qty: 30 TABLET | Refills: 0 | Status: SHIPPED | OUTPATIENT
Start: 2022-12-15 | End: 2023-01-03

## 2022-12-15 NOTE — PROGRESS NOTES
"Chief Complaint  Dizziness    Subjective         Jonathan Guerra is a 67 y.o. male who presents to [facility]    67 years old comes to the clinic today for acute symptoms.    Patient is complaining of 3 days history of dizziness/vertigo and 1 episode of syncope at home.  Patient reports syncope about 8 days ago, does not report any recent syncope but does report unsteady/vertigo intermittently.  Does not report any chest pain or shortness of breath, no headache/does report mild vision changes with those episodes.    Patient did go to the emergency room for the symptoms but due to long wait, he did not stay there but did have some blood work done.    I have reviewed blood work from emergency room including mildly elevated white blood cell counts.    Does not report this kind of episodes before.    Objective   Vital Signs:   Vitals:    12/15/22 1330   BP: 120/78   Pulse: 85   Resp: 19   Temp: 97.8 °F (36.6 °C)   SpO2: 98%   Weight: 74.8 kg (165 lb)   Height: 154.9 cm (61\")      Body mass index is 31.18 kg/m².   Wt Readings from Last 3 Encounters:   12/15/22 74.8 kg (165 lb)   11/18/22 75.3 kg (166 lb 1.6 oz)   08/10/22 74.7 kg (164 lb 9.6 oz)      BP Readings from Last 3 Encounters:   12/15/22 120/78   11/18/22 120/82   08/10/22 118/76        Patient Care Team:  Casey Mosley MD as PCP - General (Family Medicine)     Physical Exam  Vitals reviewed.   Constitutional:       Appearance: Normal appearance. He is well-developed.   HENT:      Head: Normocephalic and atraumatic.      Right Ear: External ear normal.      Left Ear: External ear normal.      Mouth/Throat:      Pharynx: No oropharyngeal exudate.   Eyes:      Conjunctiva/sclera: Conjunctivae normal.      Pupils: Pupils are equal, round, and reactive to light.   Cardiovascular:      Rate and Rhythm: Normal rate and regular rhythm.      Heart sounds: No murmur heard.    No friction rub. No gallop.   Pulmonary:      Effort: " Pulmonary effort is normal.      Breath sounds: Normal breath sounds. No wheezing or rhonchi.   Abdominal:      General: Bowel sounds are normal. There is no distension.      Palpations: Abdomen is soft.      Tenderness: There is no abdominal tenderness.   Skin:     General: Skin is warm and dry.   Neurological:      Mental Status: He is alert and oriented to person, place, and time.      Cranial Nerves: No cranial nerve deficit.   Psychiatric:         Mood and Affect: Mood and affect normal.         Behavior: Behavior normal.         Thought Content: Thought content normal.         Judgment: Judgment normal.              ECG 12 Lead    Date/Time: 12/15/2022 2:12 PM  Performed by: Casey Mosley MD  Authorized by: Casey Mosley MD   Comparison: compared with previous ECG from 12/13/2022  Similar to previous ECG  Rhythm: sinus rhythm  Rate: normal  BPM: 77  Conduction: conduction normal  T Waves: T waves normal  QRS axis: normal  Other: no other findings    Clinical impression: normal ECG                   Assessment and Plan   Diagnoses and all orders for this visit:    1. Dizziness (Primary)  -     CT Head Without Contrast; Future  -     Duplex Carotid Ultrasound CAR; Future    2. Fall, subsequent encounter  -     CT Head Without Contrast; Future  -     Duplex Carotid Ultrasound CAR; Future    3. Syncope, unspecified syncope type  -     CT Head Without Contrast; Future  -     Duplex Carotid Ultrasound CAR; Future    4. Vertigo  -     CT Head Without Contrast; Future  -     Duplex Carotid Ultrasound CAR; Future  -     meclizine (ANTIVERT) 25 MG tablet; Take 1 tablet by mouth 3 (Three) Times a Day As Needed for Dizziness.  Dispense: 30 tablet; Refill: 0    Other orders  -     ECG 12 Lead      Recent ER visit blood work reviewed.    Normal EKG    Will prescribe meclizine    Stat CT and Doppler carotid ultrasound.  Patient will need ENT/cardiologist if not improved or any worsening.  Strict blood pressure  monitoring/heart rate monitoring discussed.  Increase fluid intake, no driving for now until asymptomatic.    Strict ER precautions discussed if any changes to quickly or not improvement at all.    Tobacco Use: Medium Risk   • Smoking Tobacco Use: Former   • Smokeless Tobacco Use: Never   • Passive Exposure: Not on file            Follow Up   Return if symptoms worsen or fail to improve.  Patient was given instructions and counseling regarding his condition or for health maintenance advice. Please see specific information pulled into the AVS if appropriate.

## 2022-12-16 ENCOUNTER — HOSPITAL ENCOUNTER (OUTPATIENT)
Dept: CARDIOLOGY | Facility: HOSPITAL | Age: 67
Discharge: HOME | End: 2022-12-16

## 2022-12-16 ENCOUNTER — HOSPITAL ENCOUNTER (OUTPATIENT)
Dept: CT IMAGING | Facility: HOSPITAL | Age: 67
Discharge: HOME | End: 2022-12-16
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
## 2022-12-16 DIAGNOSIS — R42 VERTIGO: ICD-10-CM

## 2022-12-16 DIAGNOSIS — R42 DIZZINESS: ICD-10-CM

## 2022-12-16 DIAGNOSIS — R55 SYNCOPE, UNSPECIFIED SYNCOPE TYPE: ICD-10-CM

## 2022-12-16 DIAGNOSIS — I65.21 ICAO (INTERNAL CAROTID ARTERY OCCLUSION), RIGHT: Primary | ICD-10-CM

## 2022-12-16 LAB
BH CV XLRA MEAS LEFT CAROTID BULB EDV: 23 CM/SEC
BH CV XLRA MEAS LEFT CAROTID BULB PSV: 88 CM/SEC
BH CV XLRA MEAS LEFT DIST CCA EDV: 21 CM/SEC
BH CV XLRA MEAS LEFT DIST CCA PSV: 118 CM/SEC
BH CV XLRA MEAS LEFT DIST ICA EDV: 29 CM/SEC
BH CV XLRA MEAS LEFT DIST ICA PSV: 118 CM/SEC
BH CV XLRA MEAS LEFT MID ICA EDV: 25 CM/SEC
BH CV XLRA MEAS LEFT MID ICA PSV: 116 CM/SEC
BH CV XLRA MEAS LEFT PROX CCA EDV: 23 CM/SEC
BH CV XLRA MEAS LEFT PROX CCA PSV: 147 CM/SEC
BH CV XLRA MEAS LEFT PROX ECA EDV: 22 CM/SEC
BH CV XLRA MEAS LEFT PROX ECA PSV: 202 CM/SEC
BH CV XLRA MEAS LEFT PROX ICA EDV: 28 CM/SEC
BH CV XLRA MEAS LEFT PROX ICA PSV: 111 CM/SEC
BH CV XLRA MEAS LEFT VERTEBRAL A EDV: 5 CM/SEC
BH CV XLRA MEAS LEFT VERTEBRAL A PSV: 62 CM/SEC
BH CV XLRA MEAS RIGHT CAROTID BULB EDV: 25 CM/SEC
BH CV XLRA MEAS RIGHT CAROTID BULB PSV: 159 CM/SEC
BH CV XLRA MEAS RIGHT DIST CCA EDV: 17 CM/SEC
BH CV XLRA MEAS RIGHT DIST CCA PSV: 124 CM/SEC
BH CV XLRA MEAS RIGHT DIST ICA EDV: 23 CM/SEC
BH CV XLRA MEAS RIGHT DIST ICA PSV: 100 CM/SEC
BH CV XLRA MEAS RIGHT MID ICA EDV: 29 CM/SEC
BH CV XLRA MEAS RIGHT MID ICA PSV: 94 CM/SEC
BH CV XLRA MEAS RIGHT PROX CCA EDV: 19 CM/SEC
BH CV XLRA MEAS RIGHT PROX CCA PSV: 116 CM/SEC
BH CV XLRA MEAS RIGHT PROX ECA EDV: 16 CM/SEC
BH CV XLRA MEAS RIGHT PROX ECA PSV: 195 CM/SEC
BH CV XLRA MEAS RIGHT PROX ICA EDV: 23 CM/SEC
BH CV XLRA MEAS RIGHT PROX ICA PSV: 202 CM/SEC
BH CV XLRA MEAS RIGHT VERTEBRAL A EDV: 14 CM/SEC
BH CV XLRA MEAS RIGHT VERTEBRAL A PSV: 66 CM/SEC
LEFT ARM BP: NORMAL MMHG
MAXIMAL PREDICTED HEART RATE: 155 BPM
RIGHT ARM BP: NORMAL MMHG
STRESS TARGET HR: 132 BPM

## 2022-12-16 PROCEDURE — 93880 EXTRACRANIAL BILAT STUDY: CPT

## 2022-12-16 PROCEDURE — 70450 CT HEAD/BRAIN W/O DYE: CPT

## 2022-12-16 PROCEDURE — 93880 EXTRACRANIAL BILAT STUDY: CPT | Performed by: SURGERY

## 2022-12-27 ENCOUNTER — OFFICE VISIT (OUTPATIENT)
Dept: VASCULAR SURGERY | Facility: HOSPITAL | Age: 67
End: 2022-12-27

## 2022-12-27 VITALS
RESPIRATION RATE: 18 BRPM | SYSTOLIC BLOOD PRESSURE: 160 MMHG | TEMPERATURE: 98.3 F | DIASTOLIC BLOOD PRESSURE: 80 MMHG | OXYGEN SATURATION: 97 % | HEART RATE: 94 BPM

## 2022-12-27 DIAGNOSIS — I65.23 BILATERAL CAROTID ARTERY STENOSIS: Primary | ICD-10-CM

## 2022-12-27 PROCEDURE — G0463 HOSPITAL OUTPT CLINIC VISIT: HCPCS | Performed by: SURGERY

## 2022-12-27 PROCEDURE — 99204 OFFICE O/P NEW MOD 45 MIN: CPT | Performed by: SURGERY

## 2022-12-27 NOTE — PROGRESS NOTES
[facility]   HISTORY AND PHYSICAL    Patient Name: Jonathan Guerra  : 1955  MRN: [id number]  Primary Care Physician:  Casey Mosley MD      Subjective   Subjective     Chief Complaint: Dizziness.  Carotid stenosis.    HPI:    Jonathan Guerra is a 67 y.o. male started having some episodes of dizziness and instability.  Here for further work-up.  Carotid duplex was performed and showed moderate right carotid stenosis.  He has been referred to me for that.  He is left-handed.  Does not have any lateralizing symptoms.  Diffuse numbness in the hands.  Bilateral eyes have floaters but no unilateral symptoms to suggest a TIA or stroke.  He is a non-smoker.  Quit 15 years ago.  Not a diabetic.  Takes high-dose statin.  Not on any antiplatelet therapy.    Review of Systems  No significant chest pain symptoms.  He is disabled because he had a significant injury at work when he was a .  He had a fall with weight fall on him including his head.    Personal History     Past Medical History:   Diagnosis Date   • Acid reflux    • Acid reflux disease    • Anemia, unspecified    • Anxiety    • Arthritis    • Asthma    • Broken bones    • Calcaneus fracture, left    • Chronic bronchitis (HCC)    • COPD (chronic obstructive pulmonary disease) (HCC)    • Depression    • Diverticulitis    • Emphysema lung (HCC)    • HBP (high blood pressure)    • Head injury     skull fracture    • Hernia cerebri (HCC)    • HTN (hypertension)    • Hyperlipemia    • Lung disease        Past Surgical History:   Procedure Laterality Date   • COLONOSCOPY  2019   • ENDOSCOPY         Family History: family history includes Colon cancer in his mother and paternal grandmother; Diabetes in his sister; Heart disease in his mother; Other in his mother. Otherwise pertinent FHx was reviewed and not pertinent to current issue.    Social History:  reports that he quit smoking about 16 years ago. His smoking use included  cigarettes. He has a 60.00 pack-year smoking history. He has never used smokeless tobacco. He reports that he does not drink alcohol and does not use drugs.    Home Medications:  Current Outpatient Medications on File Prior to Visit   Medication Sig   • albuterol sulfate  (90 Base) MCG/ACT inhaler Inhale 2 puffs 4 (Four) Times a Day.   • atorvastatin (LIPITOR) 80 MG tablet Take 1 tablet by mouth Daily.   • Breztri Aerosphere 160-9-4.8 MCG/ACT aerosol inhaler INHALE 2 PUFFS 2 (TWO) TIMES A DAY. SYMPTOMS NOT CONTROLLED WITH ADVAIR AND COMBIVENT   • cetirizine (zyrTEC) 10 MG tablet Take 1 tablet by mouth Daily.   • doxycycline (VIBRAMYCIN) 100 MG capsule Take 1 capsule by mouth 2 (Two) Times a Day.   • ketotifen (ZADITOR) 0.025 % ophthalmic solution    • lisinopril-hydrochlorothiazide (PRINZIDE,ZESTORETIC) 20-12.5 MG per tablet Take 1 tablet by mouth Daily.   • meclizine (ANTIVERT) 25 MG tablet Take 1 tablet by mouth 3 (Three) Times a Day As Needed for Dizziness.   • metoprolol succinate XL (Toprol XL) 25 MG 24 hr tablet Take 1 tablet by mouth 2 (two) times a day.   • omeprazole (priLOSEC) 20 MG capsule Take 1 capsule by mouth Daily.   • predniSONE (DELTASONE) 20 MG tablet Take 2 tablets by mouth Daily.     No current facility-administered medications on file prior to visit.          Allergies:  Allergies   Allergen Reactions   • Azithromycin Unknown - High Severity   • Codeine Unknown - High Severity   • Penicillins Unknown - High Severity       Objective   Objective     Vitals:   Temp:  [98.3 °F (36.8 °C)] 98.3 °F (36.8 °C)  Heart Rate:  [94] 94  Resp:  [18] 18  BP: (142-160)/(80-84) 160/80    Physical Exam   Patient is awake and alert.  No distress.  Appears stated age.  Obese.  Regular rhythm.  Nonlabored breathing.  Abdomen is soft.  No aneurysm is palpable.  1+ DP pulses.  2+ femoral pulses.  Palpable radial pulses bilaterally.  No gross neurological deficits.     Result Review    Most notable findings  include: Moderate right carotid stenosis.  This is less than 70% based on duplex velocities.  Bilateral vertebral antegrade flow.    Assessment & Plan   Assessment / Plan     Brief Patient Summary:  Jonathan Guerra is a 67 y.o. male who has moderate right carotid stenosis.  He is asymptomatic from this.  Based on best available data, intervention is recommended for greater than 80% asymptomatic stenosis in a low risk patient versus moderate to severe stenosis and a symptomatic patient.  He does not fit the criteria for either.    Diagnoses and all orders for this visit:    1. Bilateral carotid artery stenosis (Primary)  -     Duplex Carotid Ultrasound CAR; Future         Plan:   We will follow him closely with a duplex in 6 months.  If he has any lateralizing symptoms he needs to get to the ER urgently.  He should start taking low-dose aspirin every day.  Continue his statin.  We discussed healthy lifestyle with diet modification and exercise.  He is motivated.    Follow-up in 6 months with duplex.        Electronically signed by Manuel Mcfadden MD, 12/27/22, 8:51 AM EST.

## 2023-01-03 DIAGNOSIS — R42 VERTIGO: ICD-10-CM

## 2023-01-03 RX ORDER — MECLIZINE HYDROCHLORIDE 25 MG/1
25 TABLET ORAL 3 TIMES DAILY PRN
Qty: 30 TABLET | Refills: 0 | Status: SHIPPED | OUTPATIENT
Start: 2023-01-03

## 2023-01-03 RX ORDER — CETIRIZINE HYDROCHLORIDE 10 MG/1
10 TABLET ORAL DAILY
Qty: 30 TABLET | Refills: 3 | Status: SHIPPED | OUTPATIENT
Start: 2023-01-03

## 2023-01-05 ENCOUNTER — TELEPHONE (OUTPATIENT)
Dept: FAMILY MEDICINE CLINIC | Facility: CLINIC | Age: 68
End: 2023-01-05
Payer: MEDICARE

## 2023-01-05 DIAGNOSIS — R42 VERTIGO: ICD-10-CM

## 2023-01-05 RX ORDER — MECLIZINE HYDROCHLORIDE 25 MG/1
25 TABLET ORAL 3 TIMES DAILY PRN
Qty: 30 TABLET | Refills: 0 | OUTPATIENT
Start: 2023-01-05

## 2023-01-05 NOTE — TELEPHONE ENCOUNTER
Caller: Jonathan Guerra Jr    Relationship: Self    Best call back number:[phone]    What medication are you requesting: SOMETHING ELSE FOR DIZZINESS      If a prescription is needed, what is your preferred pharmacy and phone number: [facility] - [place], [place] - [address]  [phone] [facility] [phone] [facility]     Additional notes:  PATIENT REPORTS THAT THE meclizine (ANTIVERT) 25 MG tablet IS NOT HELPING OR WORKING.. PLEASE ADVISE

## 2023-01-06 DIAGNOSIS — R42 DIZZINESS: Primary | ICD-10-CM

## 2023-01-06 RX ORDER — DIAZEPAM 2 MG/1
2 TABLET ORAL EVERY 12 HOURS PRN
Qty: 15 TABLET | Refills: 0 | Status: SHIPPED | OUTPATIENT
Start: 2023-01-06 | End: 2023-01-17

## 2023-01-06 NOTE — TELEPHONE ENCOUNTER
Patient aware     Per PCP   I have sent valium and ENT consult. Valium is control sub, use is carefully, stop if any SE. F/u with me asap if not better or any worsening,     Off note: I will make an exception for UDS/Contract due to acute dizziness and symptoms. I have no concern of any misuse with pt and not for long term use.

## 2023-01-17 DIAGNOSIS — R42 DIZZINESS: ICD-10-CM

## 2023-01-17 RX ORDER — DIAZEPAM 2 MG/1
2 TABLET ORAL EVERY 12 HOURS PRN
Qty: 15 TABLET | Refills: 0 | Status: SHIPPED | OUTPATIENT
Start: 2023-01-17 | End: 2023-01-26

## 2023-01-18 NOTE — TELEPHONE ENCOUNTER
Spoke to the patient about the medication and if it was working. Patient stated that the medication helps, but he is still having breakthrough spells of dizziness. He stated that he went to the doctor he was referred to, they did X-Ray's and he was told to wait at least 6 months and then they could see about treatment. I scheduled patient to come in next week as he stated that the dizzy spells are still affecting his daily life and activities.

## 2023-01-24 ENCOUNTER — OFFICE VISIT (OUTPATIENT)
Dept: FAMILY MEDICINE CLINIC | Facility: CLINIC | Age: 68
End: 2023-01-24
Payer: MEDICARE

## 2023-01-24 VITALS
HEIGHT: 61 IN | DIASTOLIC BLOOD PRESSURE: 75 MMHG | WEIGHT: 169.9 LBS | OXYGEN SATURATION: 96 % | SYSTOLIC BLOOD PRESSURE: 138 MMHG | HEART RATE: 78 BPM | BODY MASS INDEX: 32.08 KG/M2 | TEMPERATURE: 97.9 F

## 2023-01-24 DIAGNOSIS — R42 VERTIGO: ICD-10-CM

## 2023-01-24 DIAGNOSIS — Z00.00 MEDICARE ANNUAL WELLNESS VISIT, SUBSEQUENT: Primary | ICD-10-CM

## 2023-01-24 DIAGNOSIS — Z79.899 ENCOUNTER FOR MEDICATION MANAGEMENT: ICD-10-CM

## 2023-01-24 DIAGNOSIS — R42 DIZZINESS: ICD-10-CM

## 2023-01-24 DIAGNOSIS — N30.90 CYSTITIS: ICD-10-CM

## 2023-01-24 LAB
AMPHET+METHAMPHET UR QL: NEGATIVE
AMPHETAMINE INTERNAL CONTROL: ABNORMAL
AMPHETAMINES UR QL: NEGATIVE
BARBITURATE INTERNAL CONTROL: ABNORMAL
BARBITURATES UR QL SCN: NEGATIVE
BENZODIAZ UR QL SCN: POSITIVE
BENZODIAZEPINE INTERNAL CONTROL: ABNORMAL
BUPRENORPHINE INTERNAL CONTROL: ABNORMAL
BUPRENORPHINE SERPL-MCNC: NEGATIVE NG/ML
CANNABINOIDS SERPL QL: NEGATIVE
COCAINE INTERNAL CONTROL: ABNORMAL
COCAINE UR QL: NEGATIVE
EXPIRATION DATE: ABNORMAL
Lab: ABNORMAL
MDMA (ECSTASY) INTERNAL CONTROL: ABNORMAL
MDMA UR QL SCN: NEGATIVE
METHADONE INTERNAL CONTROL: ABNORMAL
METHADONE UR QL SCN: NEGATIVE
METHAMPHETAMINE INTERNAL CONTROL: ABNORMAL
OPIATES INTERNAL CONTROL: ABNORMAL
OPIATES UR QL: NEGATIVE
OXYCODONE INTERNAL CONTROL: ABNORMAL
OXYCODONE UR QL SCN: NEGATIVE
PCP UR QL SCN: NEGATIVE
PHENCYCLIDINE INTERNAL CONTROL: ABNORMAL
THC INTERNAL CONTROL: ABNORMAL

## 2023-01-24 PROCEDURE — G0402 INITIAL PREVENTIVE EXAM: HCPCS | Performed by: STUDENT IN AN ORGANIZED HEALTH CARE EDUCATION/TRAINING PROGRAM

## 2023-01-24 PROCEDURE — 1159F MED LIST DOCD IN RCRD: CPT | Performed by: STUDENT IN AN ORGANIZED HEALTH CARE EDUCATION/TRAINING PROGRAM

## 2023-01-24 PROCEDURE — 99214 OFFICE O/P EST MOD 30 MIN: CPT | Performed by: STUDENT IN AN ORGANIZED HEALTH CARE EDUCATION/TRAINING PROGRAM

## 2023-01-24 PROCEDURE — 1170F FXNL STATUS ASSESSED: CPT | Performed by: STUDENT IN AN ORGANIZED HEALTH CARE EDUCATION/TRAINING PROGRAM

## 2023-01-24 PROCEDURE — 80305 DRUG TEST PRSMV DIR OPT OBS: CPT | Performed by: STUDENT IN AN ORGANIZED HEALTH CARE EDUCATION/TRAINING PROGRAM

## 2023-01-24 NOTE — PROGRESS NOTES
The ABCs of the Annual Wellness Visit  Subsequent Medicare Wellness Visit    Subjective    Jonathan Guerra is a 67 y.o. male who presents for a Subsequent Medicare Wellness Visit.    The following portions of the patient's history were reviewed and   updated as appropriate: allergies, current medications, past family history, past medical history, past social history, past surgical history and problem list.    Compared to one year ago, the patient feels his physical   health is the same.    Compared to one year ago, the patient feels his mental   health is the same.    Recent Hospitalizations:  This patient has had a [facility] admission record on file within the last 365 days.    Current Medical Providers:  Patient Care Team:  Casey Mosley MD as PCP - General (Family Medicine)    Outpatient Medications Prior to Visit   Medication Sig Dispense Refill   • albuterol sulfate  (90 Base) MCG/ACT inhaler Inhale 2 puffs 4 (Four) Times a Day.     • atorvastatin (LIPITOR) 80 MG tablet Take 1 tablet by mouth Daily. 90 tablet 1   • Breztri Aerosphere 160-9-4.8 MCG/ACT aerosol inhaler INHALE 2 PUFFS 2 (TWO) TIMES A DAY. SYMPTOMS NOT CONTROLLED WITH ADVAIR AND COMBIVENT 10.7 g 1   • cetirizine (zyrTEC) 10 MG tablet TAKE 1 TABLET BY MOUTH DAILY. 30 tablet 3   • diazePAM (VALIUM) 2 MG tablet TAKE 1 TABLET BY MOUTH EVERY 12 (TWELVE) HOURS AS NEEDED (DIZZINESS). 15 tablet 0   • ketotifen (ZADITOR) 0.025 % ophthalmic solution      • lisinopril-hydrochlorothiazide (PRINZIDE,ZESTORETIC) 20-12.5 MG per tablet Take 1 tablet by mouth Daily. 90 tablet 1   • meclizine (ANTIVERT) 25 MG tablet TAKE 1 TABLET BY MOUTH 3 (THREE) TIMES A DAY AS NEEDED FOR DIZZINESS. 30 tablet 0   • metoprolol succinate XL (Toprol XL) 25 MG 24 hr tablet Take 1 tablet by mouth 2 (two) times a day. 180 tablet 1   • omeprazole (priLOSEC) 20 MG capsule Take 1 capsule by mouth Daily. 90 capsule 3   • doxycycline (VIBRAMYCIN) 100 MG capsule Take 1  "capsule by mouth 2 (Two) Times a Day. 20 capsule 0   • predniSONE (DELTASONE) 20 MG tablet Take 2 tablets by mouth Daily. 10 tablet 0     No facility-administered medications prior to visit.       No opioid medication identified on active medication list. I have reviewed chart for other potential  high risk medication/s and harmful drug interactions in the elderly.          Aspirin is not on active medication list.  Aspirin use is not indicated based on review of current medical condition/s. Risk of harm outweighs potential benefits.  .    Patient Active Problem List   Diagnosis   • Acid reflux   • Chronic obstructive pulmonary disease (HCC)   • Anemia   • Anxiety   • Arthritis   • Asthma   • Broken bones   • Cardiovascular symptoms   • Closed nondisplaced fracture of left calcaneus with routine healing   • Depression   • Diverticulitis   • Ex-smoker   • Head injury   • Hyperlipemia   • Hypertensive heart disease without congestive heart failure   • Hypertension   • Luetscher's syndrome   • Lung disease   • Multiple nodules of lung   • Ringing in ears   • Shortness of breath   • Chest pain   • Acute renal failure (ARF) (HCC)   • Dysuria     Advance Care Planning  Advance Directive is not on file.  ACP discussion was held with the patient during this visit. Patient does not have an advance directive, information provided.     Objective    Vitals:    01/24/23 0732   BP: 138/75   BP Location: Left arm   Patient Position: Sitting   Cuff Size: Adult   Pulse: 78   Temp: 97.9 °F (36.6 °C)   TempSrc: Temporal   SpO2: 96%   Weight: 77.1 kg (169 lb 14.4 oz)   Height: 154.9 cm (60.98\")     Estimated body mass index is 32.12 kg/m² as calculated from the following:    Height as of this encounter: 154.9 cm (60.98\").    Weight as of this encounter: 77.1 kg (169 lb 14.4 oz).    BMI is >= 30 and <35. (Class 1 Obesity). The following options were offered after discussion;: exercise counseling/recommendations      Does the patient " have evidence of cognitive impairment? No          HEALTH RISK ASSESSMENT    Smoking Status:  Social History     Tobacco Use   Smoking Status Former   • Packs/day: 1.50   • Years: 40.00   • Pack years: 60.00   • Types: Cigarettes   • Quit date:    • Years since quittin.0   Smokeless Tobacco Never     Alcohol Consumption:  Social History     Substance and Sexual Activity   Alcohol Use Never    Comment: [facility]      Fall Risk Screen:    PAOADI Fall Risk Assessment was completed, and patient is at LOW risk for falls.Assessment completed on:2023    Depression Screening:  PHQ-2/PHQ-9 Depression Screening 2023   Little Interest or Pleasure in Doing Things 0-->not at all   Feeling Down, Depressed or Hopeless 0-->not at all   PHQ-9: Brief Depression Severity Measure Score 0       Health Habits and Functional and Cognitive Screening:  Functional & Cognitive Status 2023   Do you have difficulty preparing food and eating? No   Do you have difficulty bathing yourself, getting dressed or grooming yourself? No   Do you have difficulty using the toilet? No   Do you have difficulty moving around from place to place? No   Do you have trouble with steps or getting out of a bed or a chair? No   Current Diet Well Balanced Diet   Dental Exam Up to date   Eye Exam Up to date   Exercise (times per week) 5 times per week   Current Exercises Include Yard Work;House Cleaning;Walking;Other   Do you need help using the phone?  No   Are you deaf or do you have serious difficulty hearing?  No   Do you need help with transportation? No   Do you need help shopping? No   Do you need help preparing meals?  No   Do you need help with housework?  No   Do you need help with laundry? No   Do you need help taking your medications? No   Do you need help managing money? No   Do you ever drive or ride in a car without wearing a seat belt? No   Have you felt unusual stress, anger or loneliness in the last month? No   Who do you  live with? Alone   If you need help, do you have trouble finding someone available to you? No   Have you been bothered in the last four weeks by sexual problems? No       Age-appropriate Screening Schedule:  Refer to the list below for future screening recommendations based on patient's age, sex and/or medical conditions. Orders for these recommended tests are listed in the plan section. The patient has been provided with a written plan.    Health Maintenance   Topic Date Due   • ZOSTER VACCINE (1 of 2) 01/24/2024 (Originally 11/15/2007)   • LIPID PANEL  07/04/2023   • TDAP/TD VACCINES (2 - Td or Tdap) 08/08/2029   • INFLUENZA VACCINE  Completed                CMS Preventative Services Quick Reference  Risk Factors Identified During Encounter  Fall Risk-High or Moderate: Discussed Fall Prevention in the home  Dental Screening Recommended  The above risks/problems have been discussed with the patient.  Pertinent information has been shared with the patient in the After Visit Summary.  An After Visit Summary and PPPS were made available to the patient.    Follow Up:   Next Medicare Wellness visit to be scheduled in 1 year.       Additional E&M Note during same encounter follows:  Patient has multiple medical problems which are significant and separately identifiable that require additional work above and beyond the Medicare Wellness Visit.      Chief Complaint  No chief complaint on file.    Subjective        HPI  Jonathan Guerra is also being seen today for dizziness.    Patient reports last few months intermittent mild dizziness and reported 1-2 falls without any loss of consciousness/seizures.    Patient was seen by ENT for dizziness.  I have prescribed diazepam and patient reported good response with that.  Patient is asymptomatic since last 1 week.    Patient is taking allergy medications and blood pressure medications.  Reports that he is not drinking enough fluids but does not report any incidence of low  "blood pressure.  Denies any chest pain or shortness of breath or any palpitations.  Had vision exam about a month ago and got new glasses.         Objective   Vital Signs:  /75 (BP Location: Left arm, Patient Position: Sitting, Cuff Size: Adult)   Pulse 78   Temp 97.9 °F (36.6 °C) (Temporal)   Ht 154.9 cm (60.98\")   Wt 77.1 kg (169 lb 14.4 oz)   SpO2 96%   BMI 32.12 kg/m²     Physical Exam  Vitals reviewed.   Constitutional:       Appearance: Normal appearance. He is well-developed.   HENT:      Head: Normocephalic and atraumatic.      Right Ear: External ear normal.      Left Ear: External ear normal.      Mouth/Throat:      Pharynx: No oropharyngeal exudate.   Eyes:      Conjunctiva/sclera: Conjunctivae normal.      Pupils: Pupils are equal, round, and reactive to light.   Cardiovascular:      Rate and Rhythm: Normal rate and regular rhythm.      Heart sounds: No murmur heard.    No friction rub. No gallop.   Pulmonary:      Effort: Pulmonary effort is normal.      Breath sounds: Normal breath sounds. No wheezing or rhonchi.   Abdominal:      General: Bowel sounds are normal. There is no distension.      Palpations: Abdomen is soft.      Tenderness: There is no abdominal tenderness.   Skin:     General: Skin is warm and dry.   Neurological:      Mental Status: He is alert and oriented to person, place, and time.      Cranial Nerves: No cranial nerve deficit.   Psychiatric:         Mood and Affect: Mood and affect normal.         Behavior: Behavior normal.         Thought Content: Thought content normal.         Judgment: Judgment normal.                     Assessment and Plan   Diagnoses and all orders for this visit:    1. Medicare annual wellness visit, subsequent (Primary)    2. Dizziness  Comments:  will c/w diazepam prn, may consider cardio and neuro if needed. Seen by ENT  Orders:  -     Ambulatory Referral to Physical Therapy Vestibular    3. Vertigo  -     Ambulatory Referral to Physical " Therapy Vestibular    4. Cystitis    5. Encounter for medication management  -     POC Urine Drug Screen [facility]      We will continue diazepam as needed.  Declined cardio and neurology at this time as patient reports no dizziness in last 1 week.    UDS and contract signed for diazepam.    I have advised patient to avoid any allergy medications for next 2 weeks, I have also instructed patient to strictly monitor blood pressure and increase water intake.         Follow Up   Return in about 4 weeks (around 2/21/2023) for Recheck.  Patient was given instructions and counseling regarding his condition or for health maintenance advice. Please see specific information pulled into the AVS if appropriate.

## 2023-01-26 DIAGNOSIS — R42 DIZZINESS: ICD-10-CM

## 2023-01-26 RX ORDER — DIAZEPAM 2 MG/1
2 TABLET ORAL EVERY 12 HOURS PRN
Qty: 15 TABLET | Refills: 0 | Status: SHIPPED | OUTPATIENT
Start: 2023-01-26 | End: 2023-02-06

## 2023-02-06 DIAGNOSIS — R42 DIZZINESS: ICD-10-CM

## 2023-02-06 RX ORDER — DIAZEPAM 2 MG/1
2 TABLET ORAL EVERY 12 HOURS PRN
Qty: 15 TABLET | Refills: 0 | Status: SHIPPED | OUTPATIENT
Start: 2023-02-06 | End: 2023-02-16

## 2023-02-06 RX ORDER — BUDESONIDE, GLYCOPYRROLATE, AND FORMOTEROL FUMARATE 160; 9; 4.8 UG/1; UG/1; UG/1
AEROSOL, METERED RESPIRATORY (INHALATION)
Qty: 10.7 G | Refills: 1 | Status: SHIPPED | OUTPATIENT
Start: 2023-02-06

## 2023-02-16 DIAGNOSIS — R42 DIZZINESS: ICD-10-CM

## 2023-02-16 RX ORDER — DIAZEPAM 2 MG/1
2 TABLET ORAL EVERY 12 HOURS PRN
Qty: 15 TABLET | Refills: 0 | Status: SHIPPED | OUTPATIENT
Start: 2023-02-16 | End: 2023-02-23

## 2023-02-21 ENCOUNTER — TELEPHONE (OUTPATIENT)
Dept: FAMILY MEDICINE CLINIC | Facility: CLINIC | Age: 68
End: 2023-02-21

## 2023-02-21 ENCOUNTER — OFFICE VISIT (OUTPATIENT)
Dept: OTOLARYNGOLOGY | Facility: CLINIC | Age: 68
End: 2023-02-21
Payer: MEDICARE

## 2023-02-21 VITALS
HEART RATE: 86 BPM | TEMPERATURE: 97.2 F | HEIGHT: 61 IN | SYSTOLIC BLOOD PRESSURE: 145 MMHG | DIASTOLIC BLOOD PRESSURE: 80 MMHG | WEIGHT: 167.6 LBS | BODY MASS INDEX: 31.64 KG/M2

## 2023-02-21 DIAGNOSIS — R42 DIZZINESS: Primary | ICD-10-CM

## 2023-02-21 PROCEDURE — 99203 OFFICE O/P NEW LOW 30 MIN: CPT | Performed by: NURSE PRACTITIONER

## 2023-02-21 NOTE — TELEPHONE ENCOUNTER
LVMTCB    Patient missed their appointment scheduled on 2/21/2023 at 7:00am.     Would patient like to be rescheduled?    No show letter sent to patient either via [employer] or mail.     HUB TO SHARE

## 2023-02-21 NOTE — PROGRESS NOTES
Patient Name: Jonathan Guerra   Visit Date: 02/21/2023   Patient ID: [id number]  Provider: MALLORY Walker    Sex: male  Location: [facility]   YOB: 1955  Location Address: [address] [place] [address], [address] [place] [address],?[place]?[phone]    Primary Care Provider Casey Mosley MD  Location Phone: [phone]    Referring Provider: Casey Mosley MD        Chief Complaint  Dizziness    Subjective   Jonathan Guerra is a 67 y.o. male who presents to [facility] today as a consult from Casey Mosley MD for evaluation of dizziness and frequent falls.  He states that this started a little over a month ago.  He states the first time he felt dizzy he was standing at the kitchen sink washing dishes.  He states he looked up, felt dizzy and fell backward hitting his head on the stove.  He states that he believes he lost consciousness.  He states after that he had several episodes where he would be walking and would suddenly feel dizzy like everything was moving around him.  He states that he would catch himself before falling.  He states that these episodes of dizziness always occur with movement and never when he is sitting still.  He has felt like the room is spinning around him and he has had blurred vision for short periods of time.  He has not experienced this with rolling over in bed or standing up from a lying position.  He states he has not had any issues in the past with vertigo or any ear symptoms.  He reports that he was told that he has blockages on both sides of his carotid and is currently seeing vascular for this.  He has not been monitoring his blood pressure but is on blood pressure medication.  He states that his PCP started him on Valium which he takes every day and this has significantly helped with his dizziness symptoms.  He states that he is fairly physically active.      Current Outpatient Medications on File Prior to Visit   Medication  "Sig   • albuterol sulfate  (90 Base) MCG/ACT inhaler Inhale 2 puffs 4 (Four) Times a Day.   • atorvastatin (LIPITOR) 80 MG tablet Take 1 tablet by mouth Daily.   • Breztri Aerosphere 160-9-4.8 MCG/ACT aerosol inhaler INHALE 2 PUFFS 2 (TWO) TIMES A DAY. SYMPTOMS NOT CONTROLLED WITH ADVAIR AND COMBIVENT   • cetirizine (zyrTEC) 10 MG tablet TAKE 1 TABLET BY MOUTH DAILY.   • diazePAM (VALIUM) 2 MG tablet TAKE 1 TABLET BY MOUTH EVERY 12 (TWELVE) HOURS AS NEEDED (DIZZINESS).   • ketotifen (ZADITOR) 0.025 % ophthalmic solution    • lisinopril-hydrochlorothiazide (PRINZIDE,ZESTORETIC) 20-12.5 MG per tablet Take 1 tablet by mouth Daily.   • metoprolol succinate XL (Toprol XL) 25 MG 24 hr tablet Take 1 tablet by mouth 2 (two) times a day.   • omeprazole (priLOSEC) 20 MG capsule Take 1 capsule by mouth Daily.   • meclizine (ANTIVERT) 25 MG tablet TAKE 1 TABLET BY MOUTH 3 (THREE) TIMES A DAY AS NEEDED FOR DIZZINESS.     No current facility-administered medications on file prior to visit.        Social History     Tobacco Use   • Smoking status: Former     Packs/day: 1.50     Years: 40.00     Pack years: 60.00     Types: Cigarettes     Quit date:      Years since quittin.1   • Smokeless tobacco: Never   Vaping Use   • Vaping Use: Never used   Substance Use Topics   • Alcohol use: Never     Comment: Ocassional    • Drug use: Never       Objective     Vital Signs:   /80 (BP Location: Right arm, Patient Position: Sitting)   Pulse 86   Temp 97.2 °F (36.2 °C) (Temporal)   Ht 154.9 cm (60.98\")   Wt 76 kg (167 lb 9.6 oz)   BMI 31.69 kg/m²       Physical Exam  Constitutional:       General: He is not in acute distress.     Appearance: Normal appearance. He is not ill-appearing.   HENT:      Head: Normocephalic and atraumatic.      Jaw: There is normal jaw occlusion. No tenderness or pain on movement.      Salivary Glands: Right salivary gland is not diffusely enlarged or tender. Left salivary gland is not " diffusely enlarged or tender.      Right Ear: Tympanic membrane, ear canal and external ear normal.      Left Ear: Tympanic membrane, ear canal and external ear normal.      Nose: Nose normal. No septal deviation.      Right Sinus: No maxillary sinus tenderness or frontal sinus tenderness.      Left Sinus: No maxillary sinus tenderness or frontal sinus tenderness.      Mouth/Throat:      Lips: Pink. No lesions.      Mouth: Mucous membranes are moist. No oral lesions.      Dentition: Normal dentition.      Tongue: No lesions.      Palate: No mass and lesions.      Pharynx: Oropharynx is clear. Uvula midline.      Tonsils: No tonsillar exudate.   Eyes:      Extraocular Movements: Extraocular movements intact.      Conjunctiva/sclera: Conjunctivae normal.      Pupils: Pupils are equal, round, and reactive to light.   Neck:      Thyroid: No thyroid mass, thyromegaly or thyroid tenderness.      Trachea: Trachea normal.   Pulmonary:      Effort: Pulmonary effort is normal. No respiratory distress.   Musculoskeletal:         General: Normal range of motion.      Cervical back: Normal range of motion and neck supple. No tenderness.   Lymphadenopathy:      Cervical: No cervical adenopathy.   Skin:     General: Skin is warm and dry.   Neurological:      General: No focal deficit present.      Mental Status: He is alert and oriented to person, place, and time.      Cranial Nerves: Cranial nerves 2-12 are intact. No cranial nerve deficit.      Sensory: Sensation is intact.      Motor: Motor function is intact. No weakness.      Coordination: Coordination is intact.      Gait: Gait is intact. Gait normal.      Comments: Мария-Hallpike maneuver negative bilaterally for geotropic nystagmus or subjective vertigo symptoms   Psychiatric:         Mood and Affect: Mood normal.         Behavior: Behavior normal.         Thought Content: Thought content normal.         Judgment: Judgment normal.               Result Review :               Assessment and Plan    Diagnoses and all orders for this visit:    1. Dizziness (Primary)    On examination today bilateral external auditory canals and bilateral tympanic membrane appearance is normal.  There are no perforations and middle ears are well aerated.  Мария-Hallpike maneuver was negative bilaterally for any geotropic nystagmus or subjective vertigo symptoms.  Based on his history he may be having intermittent issues with benign paroxysmal positional vertigo but this is not definitive.  We have discussed the etiology of such and also discussed other reasons for dizziness including his vascular blockages that he reports to have.  I have given him handouts on Sidhu-Daroff exercises to perform at home.  We have also discussed performing vestibular testing.  At this time he feels like he is improved and would like to hold off any further evaluation.  We will see him back as needed.       Follow Up   No follow-ups on file.  Patient was given instructions and counseling regarding his condition or for health maintenance advice. Please see specific information pulled into the AVS if appropriate.      MALLORY Walker

## 2023-02-22 ENCOUNTER — PATIENT ROUNDING (BHMG ONLY) (OUTPATIENT)
Dept: OTOLARYNGOLOGY | Facility: CLINIC | Age: 68
End: 2023-02-22
Payer: MEDICARE

## 2023-02-22 NOTE — PROGRESS NOTES
February 22, 2023    Hello, may I speak with Jonathan Guerra?    My name is Latisha    I am  with [facility]  [address]   [place] [address] [phone].    Before we get started may I verify your date of birth? 1955    I am calling to officially welcome you to our practice and ask about your recent visit. Is this a good time to talk? yes    Tell me about your visit with us. What things went well?  patient stated visit went well        We're always looking for ways to make our patients' experiences even better. Do you have recommendations on ways we may improve?  no    Overall were you satisfied with your first visit to our practice? yes       I appreciate you taking the time to speak with me today. Is there anything else I can do for you? no      Thank you, and have a great day.

## 2023-02-23 DIAGNOSIS — R42 DIZZINESS: ICD-10-CM

## 2023-02-23 RX ORDER — DIAZEPAM 2 MG/1
2 TABLET ORAL EVERY 12 HOURS PRN
Qty: 15 TABLET | Refills: 0 | Status: SHIPPED | OUTPATIENT
Start: 2023-02-23

## 2023-02-27 DIAGNOSIS — R42 DIZZINESS: ICD-10-CM

## 2023-02-27 RX ORDER — DIAZEPAM 2 MG/1
2 TABLET ORAL EVERY 12 HOURS PRN
Qty: 15 TABLET | Refills: 0 | OUTPATIENT
Start: 2023-02-27

## 2023-03-08 ENCOUNTER — LAB (OUTPATIENT)
Dept: LAB | Facility: HOSPITAL | Age: 68
End: 2023-03-08
Payer: MEDICARE

## 2023-03-08 ENCOUNTER — OFFICE VISIT (OUTPATIENT)
Dept: FAMILY MEDICINE CLINIC | Facility: CLINIC | Age: 68
End: 2023-03-08
Payer: MEDICARE

## 2023-03-08 VITALS
WEIGHT: 167.9 LBS | SYSTOLIC BLOOD PRESSURE: 136 MMHG | DIASTOLIC BLOOD PRESSURE: 90 MMHG | OXYGEN SATURATION: 96 % | HEART RATE: 89 BPM | BODY MASS INDEX: 31.7 KG/M2 | TEMPERATURE: 97.8 F | HEIGHT: 61 IN

## 2023-03-08 DIAGNOSIS — R73.01 IMPAIRED FASTING BLOOD SUGAR: ICD-10-CM

## 2023-03-08 DIAGNOSIS — R55 SYNCOPE, UNSPECIFIED SYNCOPE TYPE: ICD-10-CM

## 2023-03-08 DIAGNOSIS — R42 DIZZINESS: Primary | ICD-10-CM

## 2023-03-08 DIAGNOSIS — Z00.00 ANNUAL PHYSICAL EXAM: ICD-10-CM

## 2023-03-08 DIAGNOSIS — R42 DIZZINESS: ICD-10-CM

## 2023-03-08 DIAGNOSIS — M54.2 NECK PAIN: ICD-10-CM

## 2023-03-08 DIAGNOSIS — I10 PRIMARY HYPERTENSION: ICD-10-CM

## 2023-03-08 LAB
ALBUMIN SERPL-MCNC: 4 G/DL (ref 3.5–5.2)
ALBUMIN/GLOB SERPL: 1.4 G/DL
ALP SERPL-CCNC: 145 U/L (ref 39–117)
ALT SERPL W P-5'-P-CCNC: 20 U/L (ref 1–41)
ANION GAP SERPL CALCULATED.3IONS-SCNC: 11.8 MMOL/L (ref 5–15)
AST SERPL-CCNC: 24 U/L (ref 1–40)
BASOPHILS # BLD AUTO: 0.04 10*3/MM3 (ref 0–0.2)
BASOPHILS NFR BLD AUTO: 0.4 % (ref 0–1.5)
BILIRUB SERPL-MCNC: 0.4 MG/DL (ref 0–1.2)
BUN SERPL-MCNC: 22 MG/DL (ref 8–23)
BUN/CREAT SERPL: 11 (ref 7–25)
CALCIUM SPEC-SCNC: 9.6 MG/DL (ref 8.6–10.5)
CHLORIDE SERPL-SCNC: 103 MMOL/L (ref 98–107)
CHOLEST SERPL-MCNC: 118 MG/DL (ref 0–200)
CO2 SERPL-SCNC: 26.2 MMOL/L (ref 22–29)
CREAT SERPL-MCNC: 2 MG/DL (ref 0.76–1.27)
DEPRECATED RDW RBC AUTO: 40.3 FL (ref 37–54)
EGFRCR SERPLBLD CKD-EPI 2021: 36.4 ML/MIN/1.73
EOSINOPHIL # BLD AUTO: 0.3 10*3/MM3 (ref 0–0.4)
EOSINOPHIL NFR BLD AUTO: 2.9 % (ref 0.3–6.2)
ERYTHROCYTE [DISTWIDTH] IN BLOOD BY AUTOMATED COUNT: 11.8 % (ref 12.3–15.4)
GLOBULIN UR ELPH-MCNC: 2.9 GM/DL
GLUCOSE SERPL-MCNC: 105 MG/DL (ref 65–99)
HBA1C MFR BLD: 5.9 % (ref 4.8–5.6)
HCT VFR BLD AUTO: 36 % (ref 37.5–51)
HDLC SERPL-MCNC: 37 MG/DL (ref 40–60)
HGB BLD-MCNC: 12.4 G/DL (ref 13–17.7)
IMM GRANULOCYTES # BLD AUTO: 0.07 10*3/MM3 (ref 0–0.05)
IMM GRANULOCYTES NFR BLD AUTO: 0.7 % (ref 0–0.5)
LDLC SERPL CALC-MCNC: 59 MG/DL (ref 0–100)
LDLC/HDLC SERPL: 1.52 {RATIO}
LYMPHOCYTES # BLD AUTO: 1.79 10*3/MM3 (ref 0.7–3.1)
LYMPHOCYTES NFR BLD AUTO: 17.6 % (ref 19.6–45.3)
MCH RBC QN AUTO: 32.3 PG (ref 26.6–33)
MCHC RBC AUTO-ENTMCNC: 34.4 G/DL (ref 31.5–35.7)
MCV RBC AUTO: 93.8 FL (ref 79–97)
MONOCYTES # BLD AUTO: 0.84 10*3/MM3 (ref 0.1–0.9)
MONOCYTES NFR BLD AUTO: 8.3 % (ref 5–12)
NEUTROPHILS NFR BLD AUTO: 7.13 10*3/MM3 (ref 1.7–7)
NEUTROPHILS NFR BLD AUTO: 70.1 % (ref 42.7–76)
NRBC BLD AUTO-RTO: 0 /100 WBC (ref 0–0.2)
PLATELET # BLD AUTO: 196 10*3/MM3 (ref 140–450)
PMV BLD AUTO: 11.6 FL (ref 6–12)
POTASSIUM SERPL-SCNC: 3.7 MMOL/L (ref 3.5–5.2)
PROT SERPL-MCNC: 6.9 G/DL (ref 6–8.5)
RBC # BLD AUTO: 3.84 10*6/MM3 (ref 4.14–5.8)
SODIUM SERPL-SCNC: 141 MMOL/L (ref 136–145)
TRIGL SERPL-MCNC: 124 MG/DL (ref 0–150)
TSH SERPL DL<=0.05 MIU/L-ACNC: 1.33 UIU/ML (ref 0.27–4.2)
VLDLC SERPL-MCNC: 22 MG/DL (ref 5–40)
WBC NRBC COR # BLD: 10.17 10*3/MM3 (ref 3.4–10.8)

## 2023-03-08 PROCEDURE — 84443 ASSAY THYROID STIM HORMONE: CPT

## 2023-03-08 PROCEDURE — 36415 COLL VENOUS BLD VENIPUNCTURE: CPT

## 2023-03-08 PROCEDURE — 99214 OFFICE O/P EST MOD 30 MIN: CPT | Performed by: STUDENT IN AN ORGANIZED HEALTH CARE EDUCATION/TRAINING PROGRAM

## 2023-03-08 PROCEDURE — 3075F SYST BP GE 130 - 139MM HG: CPT | Performed by: STUDENT IN AN ORGANIZED HEALTH CARE EDUCATION/TRAINING PROGRAM

## 2023-03-08 PROCEDURE — 80053 COMPREHEN METABOLIC PANEL: CPT

## 2023-03-08 PROCEDURE — 80061 LIPID PANEL: CPT

## 2023-03-08 PROCEDURE — 3080F DIAST BP >= 90 MM HG: CPT | Performed by: STUDENT IN AN ORGANIZED HEALTH CARE EDUCATION/TRAINING PROGRAM

## 2023-03-08 PROCEDURE — 85025 COMPLETE CBC W/AUTO DIFF WBC: CPT

## 2023-03-08 PROCEDURE — 83036 HEMOGLOBIN GLYCOSYLATED A1C: CPT
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
## 2023-03-09 ENCOUNTER — OFFICE VISIT (OUTPATIENT)
Dept: CARDIOLOGY | Facility: CLINIC | Age: 68
End: 2023-03-09
Payer: MEDICARE

## 2023-03-09 VITALS — WEIGHT: 169.8 LBS | HEIGHT: 60 IN | BODY MASS INDEX: 33.34 KG/M2

## 2023-03-09 DIAGNOSIS — R55 SYNCOPE, UNSPECIFIED SYNCOPE TYPE: ICD-10-CM

## 2023-03-09 DIAGNOSIS — N18.9 CHRONIC KIDNEY DISEASE, UNSPECIFIED CKD STAGE: ICD-10-CM

## 2023-03-09 DIAGNOSIS — I10 PRIMARY HYPERTENSION: ICD-10-CM

## 2023-03-09 DIAGNOSIS — R06.09 DYSPNEA ON EXERTION: ICD-10-CM

## 2023-03-09 DIAGNOSIS — R42 DIZZINESS: Primary | ICD-10-CM

## 2023-03-09 LAB
MAXIMAL PREDICTED HEART RATE: 155 BPM
STRESS TARGET HR: 132 BPM

## 2023-03-09 PROCEDURE — 1159F MED LIST DOCD IN RCRD: CPT | Performed by: FAMILY MEDICINE

## 2023-03-09 PROCEDURE — 1160F RVW MEDS BY RX/DR IN RCRD: CPT | Performed by: FAMILY MEDICINE

## 2023-03-09 PROCEDURE — 99214 OFFICE O/P EST MOD 30 MIN: CPT | Performed by: FAMILY MEDICINE

## 2023-03-09 PROCEDURE — 93000 ELECTROCARDIOGRAM COMPLETE: CPT | Performed by: FAMILY MEDICINE

## 2023-03-09 NOTE — ASSESSMENT & PLAN NOTE
Blood pressure control is reasonable.  Instructed if he is not already holding lisinopril/HCT, to start holding now due to renal function.  Continue metoprolol at current dose.  Monitor home BP, and if he is having abnormal readings to let us know.  No significant change in orthostatic blood pressures.  Unlikely to be etiology of symptoms, if all other testing is normal, we could consider tilt table testing.

## 2023-03-09 NOTE — ASSESSMENT & PLAN NOTE
Shortness of breath most likely attributed to his underlying COPD.  States he is also having dizziness, we will also go ahead and evaluate for LV function and valvular disorders.  He is not having any progressive new shortness of breath, and no chest and is tolerating activities fairly well.  He did have previous stress test that was negative last summer, so we will not repeat at this time.
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
## 2023-03-09 NOTE — ASSESSMENT & PLAN NOTE
Creatinine has been trending upward, he had chemistries completed yesterday with creatinine of 2.0 and a GFR of 36.  Review of his medication list lisinopril/HCTZ likely culprit, however patient believes this is a medication he had stopped taking approximately 1 month prior.  Instructed him for now I definitely want him to discontinue, and he is going to check his medication bottles at home to see if he has been taking this and let us know.  We will repeat chemistries in 1 week, if he is still trending upward, then I would recommend  referral to nephrology.

## 2023-03-09 NOTE — ASSESSMENT & PLAN NOTE
Discussed multiple differentials with patient.  We will go ahead and complete echocardiogram to evaluate for any valvular dysfunction, as well as event monitor for arrhythmia or conduction blocks/pauses which could contribute to his symptoms.  He will go ahead and be scheduled with neurology as well.

## 2023-03-09 NOTE — ASSESSMENT & PLAN NOTE
We will complete echocardiogram and cardiac event monitors to consider etiology of syncopal episodes.  If these are unremarkable, may move forward with tilt table testing.  He also has referral to neurology.

## 2023-03-27 ENCOUNTER — TELEPHONE (OUTPATIENT)
Dept: CARDIOLOGY | Facility: CLINIC | Age: 68
End: 2023-03-27
Payer: MEDICARE

## 2023-03-27 NOTE — TELEPHONE ENCOUNTER
----- Message from MALLORY Camacho sent at 3/26/2023 11:09 PM EDT -----  Echocardiogram shows the muscles of the heart are working well, with normal EF of 61 to 65%.  There are no significant valvular issues noted.  Once your Holter monitor study is available, we will contact you with results of those.

## 2023-04-03 ENCOUNTER — TELEPHONE (OUTPATIENT)
Dept: FAMILY MEDICINE CLINIC | Facility: CLINIC | Age: 68
End: 2023-04-03
Payer: MEDICARE

## 2023-04-03 RX ORDER — DOXYCYCLINE HYCLATE 100 MG/1
100 CAPSULE ORAL 2 TIMES DAILY
Qty: 20 CAPSULE | Refills: 0 | Status: SHIPPED | OUTPATIENT
Start: 2023-04-03

## 2023-04-03 RX ORDER — PREDNISONE 20 MG/1
40 TABLET ORAL DAILY
Qty: 10 TABLET | Refills: 0 | Status: SHIPPED | OUTPATIENT
Start: 2023-04-03

## 2023-04-03 NOTE — TELEPHONE ENCOUNTER
Caller: DOLLY ROCHEE    Relationship: Emergency Contact    Best call back number: [phone]    What medication are you requesting: ANTIBIOTIC    What are your current symptoms: COUGHING UP GREEN MUCUS    How long have you been experiencing symptoms: FOUR DAYS    Have you had these symptoms before:    [x] Yes  [] No    Have you been treated for these symptoms before:   [x] Yes  [] No    If a prescription is needed, what is your preferred pharmacy and phone number: [facility], [place]. [place] [address] [address] [facility].  [phone] [facility] [phone] FX     Additional notes: CALLER STATES THAT HE IS PRODUCING GREEN MUCUS AND HAS A COUGH. PATIENT THOUGHT THE ILLNESS WOULD GO AWAY BUT IT HAS NOT.

## 2023-04-06 ENCOUNTER — OFFICE VISIT (OUTPATIENT)
Dept: OTOLARYNGOLOGY | Facility: CLINIC | Age: 68
End: 2023-04-06
Payer: MEDICARE

## 2023-04-06 VITALS
SYSTOLIC BLOOD PRESSURE: 134 MMHG | TEMPERATURE: 97.4 F | WEIGHT: 165.6 LBS | BODY MASS INDEX: 27.59 KG/M2 | DIASTOLIC BLOOD PRESSURE: 63 MMHG | HEIGHT: 65 IN | HEART RATE: 76 BPM

## 2023-04-06 DIAGNOSIS — R42 DIZZINESS: Primary | ICD-10-CM

## 2023-04-06 DIAGNOSIS — R42 VERTIGO: ICD-10-CM

## 2023-04-06 PROCEDURE — 99213 OFFICE O/P EST LOW 20 MIN: CPT | Performed by: NURSE PRACTITIONER

## 2023-04-06 PROCEDURE — 3078F DIAST BP <80 MM HG: CPT | Performed by: NURSE PRACTITIONER

## 2023-04-06 PROCEDURE — 3075F SYST BP GE 130 - 139MM HG: CPT | Performed by: NURSE PRACTITIONER

## 2023-04-06 NOTE — PROGRESS NOTES
Patient Name: Jonathan uGerra   Visit Date: 04/06/2023   Patient ID: [id number]  Provider: MALLORY Walker    Sex: male  Location: [facility]   YOB: 1955  Location Address: [address] [place] [address], [address] [place] [address],?[id number]    Primary Care Provider Casey Mosley MD  Location Phone: [phone]    Referring Provider: No ref. provider found        Chief Complaint  Still having Dizziness    Subjective          Jonathan Guerra is a 67 y.o. male who presents to [facility] for a follow-up visit of dizziness.  I last saw him for this in February.  At that time he felt as though he was doing better.  However in mid March he began to experience almost daily episodes where he would be standing, doing dishes or moving around the house where he would feel dizziness and almost like the room was spinning around him.  He states some episodes would last a few minutes and others would go on for several hours.  He states he has not had any episodes in greater than a week and is feeling pretty good right now.  He is scheduled to have MRI of the brain and C-spine tomorrow and is also wearing a 30-day Holter monitor for his heart for evaluation of the symptoms.      Current Outpatient Medications on File Prior to Visit   Medication Sig   • albuterol sulfate  (90 Base) MCG/ACT inhaler Inhale 2 puffs 4 (Four) Times a Day.   • atorvastatin (LIPITOR) 80 MG tablet Take 1 tablet by mouth Daily.   • Breztri Aerosphere 160-9-4.8 MCG/ACT aerosol inhaler INHALE 2 PUFFS 2 (TWO) TIMES A DAY. SYMPTOMS NOT CONTROLLED WITH ADVAIR AND COMBIVENT   • cetirizine (zyrTEC) 10 MG tablet TAKE 1 TABLET BY MOUTH DAILY.   • diazePAM (VALIUM) 2 MG tablet TAKE 1 TABLET BY MOUTH EVERY 12 (TWELVE) HOURS AS NEEDED (DIZZINESS).   • doxycycline (VIBRAMYCIN) 100 MG capsule Take 1 capsule by mouth 2 (Two) Times a Day.   • ketotifen (ZADITOR) 0.025 % ophthalmic solution    •  "lisinopril-hydrochlorothiazide (PRINZIDE,ZESTORETIC) 20-12.5 MG per tablet Take 1 tablet by mouth Daily.   • meclizine (ANTIVERT) 25 MG tablet TAKE 1 TABLET BY MOUTH 3 (THREE) TIMES A DAY AS NEEDED FOR DIZZINESS.   • metoprolol succinate XL (Toprol XL) 25 MG 24 hr tablet Take 1 tablet by mouth 2 (two) times a day.   • omeprazole (priLOSEC) 20 MG capsule Take 1 capsule by mouth Daily.   • predniSONE (DELTASONE) 20 MG tablet Take 2 tablets by mouth Daily.     No current facility-administered medications on file prior to visit.        Social History     Tobacco Use   • Smoking status: Former     Packs/day: 1.50     Years: 40.00     Pack years: 60.00     Types: Cigarettes     Quit date:      Years since quittin.2   • Smokeless tobacco: Never   Vaping Use   • Vaping Use: Never used   Substance Use Topics   • Alcohol use: Never     Comment: Ocassional    • Drug use: Never        Objective     Vital Signs:   /63 (BP Location: Left arm, Patient Position: Sitting, Cuff Size: Adult)   Pulse 76   Temp 97.4 °F (36.3 °C) (Temporal)   Ht 165.1 cm (65\")   Wt 75.1 kg (165 lb 9.6 oz)   BMI 27.56 kg/m²       Physical Exam  Constitutional:       General: He is not in acute distress.     Appearance: Normal appearance. He is not ill-appearing.   HENT:      Head: Normocephalic and atraumatic.      Jaw: There is normal jaw occlusion. No tenderness or pain on movement.      Salivary Glands: Right salivary gland is not diffusely enlarged or tender. Left salivary gland is not diffusely enlarged or tender.      Right Ear: Tympanic membrane, ear canal and external ear normal.      Left Ear: Tympanic membrane, ear canal and external ear normal.      Nose: Nose normal. No septal deviation.      Right Sinus: No maxillary sinus tenderness or frontal sinus tenderness.      Left Sinus: No maxillary sinus tenderness or frontal sinus tenderness.      Mouth/Throat:      Lips: Pink. No lesions.      Mouth: Mucous membranes are moist. " No oral lesions.      Dentition: Normal dentition.      Tongue: No lesions.      Palate: No mass and lesions.      Pharynx: Oropharynx is clear. Uvula midline.      Tonsils: No tonsillar exudate.   Eyes:      Extraocular Movements: Extraocular movements intact.      Conjunctiva/sclera: Conjunctivae normal.      Pupils: Pupils are equal, round, and reactive to light.   Neck:      Thyroid: No thyroid mass, thyromegaly or thyroid tenderness.      Trachea: Trachea normal.   Pulmonary:      Effort: Pulmonary effort is normal. No respiratory distress.   Musculoskeletal:         General: Normal range of motion.      Cervical back: Normal range of motion and neck supple. No tenderness.   Lymphadenopathy:      Cervical: No cervical adenopathy.   Skin:     General: Skin is warm and dry.   Neurological:      General: No focal deficit present.      Mental Status: He is alert and oriented to person, place, and time.      Cranial Nerves: No cranial nerve deficit.      Motor: No weakness.      Gait: Gait normal.   Psychiatric:         Mood and Affect: Mood normal.         Behavior: Behavior normal.         Thought Content: Thought content normal.         Judgment: Judgment normal.                Result Review :               Assessment and Plan    Diagnoses and all orders for this visit:    1. Dizziness (Primary)    2. Vertigo    The intermittent nature of his symptoms sound like he may be having benign paroxysmal positional vertigo.  I will set him up for vestibular therapy and have encouraged him to change positions slowly.       Follow Up   No follow-ups on file.  Patient was given instructions and counseling regarding his condition or for health maintenance advice. Please see specific information pulled into the AVS if appropriate.     MALLORY Walker

## 2023-04-07 ENCOUNTER — HOSPITAL ENCOUNTER (OUTPATIENT)
Dept: MRI IMAGING | Facility: HOSPITAL | Age: 68
Discharge: HOME | End: 2023-04-07
Admitting: STUDENT IN AN ORGANIZED HEALTH CARE EDUCATION/TRAINING PROGRAM
Payer: MEDICARE

## 2023-04-07 DIAGNOSIS — M54.2 NECK PAIN: Primary | ICD-10-CM

## 2023-04-07 DIAGNOSIS — R93.7 ABNORMAL MRI, CERVICAL SPINE: ICD-10-CM

## 2023-04-07 DIAGNOSIS — R55 SYNCOPE, UNSPECIFIED SYNCOPE TYPE: ICD-10-CM

## 2023-04-07 DIAGNOSIS — R42 DIZZINESS: ICD-10-CM

## 2023-04-07 DIAGNOSIS — M48.02 CERVICAL STENOSIS OF SPINAL CANAL: ICD-10-CM

## 2023-04-07 DIAGNOSIS — M54.2 NECK PAIN: ICD-10-CM

## 2023-04-07 PROCEDURE — 72141 MRI NECK SPINE W/O DYE: CPT

## 2023-04-17 LAB
MAXIMAL PREDICTED HEART RATE: 155 BPM
STRESS TARGET HR: 132 BPM

## 2023-04-18 ENCOUNTER — TELEPHONE (OUTPATIENT)
Dept: CARDIOLOGY | Facility: CLINIC | Age: 68
End: 2023-04-18
Payer: MEDICARE

## 2023-04-18 NOTE — TELEPHONE ENCOUNTER
----- Message from MALLORY Camacho sent at 4/17/2023 10:09 PM EDT -----  Cardiac event monitor did not show any arrhythmias, or conduction blocks which would have attributed to the syncopal episodes.  His underlying rhythm is normal sinus, with an average heart rate of 99.  Recommend he go ahead and do treatment for vertigo as recommended by ENT, and we can schedule a 6-month follow-up with Dr. Bermudez for re-evaluation.

## 2023-04-20 ENCOUNTER — TELEPHONE (OUTPATIENT)
Dept: CARDIOLOGY | Facility: CLINIC | Age: 68
End: 2023-04-20
Payer: MEDICARE

## 2023-04-24 RX ORDER — CETIRIZINE HYDROCHLORIDE 10 MG/1
10 TABLET ORAL DAILY
Qty: 30 TABLET | Refills: 3 | Status: SHIPPED | OUTPATIENT
Start: 2023-04-24

## 2023-04-26 ENCOUNTER — OFFICE VISIT (OUTPATIENT)
Dept: NEUROSURGERY | Facility: CLINIC | Age: 68
End: 2023-04-26
Payer: MEDICARE

## 2023-04-26 VITALS
BODY MASS INDEX: 27.64 KG/M2 | WEIGHT: 165.9 LBS | HEART RATE: 88 BPM | SYSTOLIC BLOOD PRESSURE: 145 MMHG | DIASTOLIC BLOOD PRESSURE: 97 MMHG | HEIGHT: 65 IN

## 2023-04-26 DIAGNOSIS — M47.812 CERVICAL SPONDYLOSIS WITHOUT MYELOPATHY: Primary | ICD-10-CM

## 2023-04-26 DIAGNOSIS — R42 DIZZY SPELLS: ICD-10-CM

## 2023-04-26 NOTE — PROGRESS NOTES
Chief Complaint  pt had a traumatic fall in 2005 and Dizziness    Subjective          Jonathan Guerra who is a 67 y.o. year old male who presents to [facility] for evaluation of cervical spine.      The patient complains of pain located in the cervical spine.  Patients states the pain has been present for several years.  The pain came on acutely.  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 At that time he suffered multiple skull fractures, vertebral fractures. The pain scale level is 9.  The pain does not radiate. .  The pain is waxing/waning and described as aching.  The pain is worse at no particular time of day. Patient states nothing worsens the pain. Patient states rest makes the pain better.    Associated Symptoms Include: Denies numbness and tingling  Conservative Interventions Include: None    Was this the result of an injury or accident?: Yes, Fall and Work Injury 2005    History of Previous Spinal Surgery?: No    Nicotine use: former smoker, quit 18 years ago    BMI: Body mass index is 27.61 kg/m².    More recently he has had concerns of dizziness and vertigo with syncope. He reports a history of seizures following his fall. He states that he will have episodes that he will be standing and will start shaking and black out, resulting in falls. He had a CT of the head demonstrating mild atrophy and white matter changes. US Carotids were unremarkable. He scheduled to see Neurology evaluation. He was evaluated by ENT, who suspected vertigo. He was referred for vestibular therapy though never went. Dizziness has improved, though spells continue.    Review of Systems   Musculoskeletal: Positive for neck pain and neck stiffness.   Neurological: Positive for dizziness and syncope.   All other systems reviewed and are negative.       Objective   Vital Signs:   /97 (BP Location: Left arm, Patient Position: Sitting, Cuff Size: Adult)    "Pulse 88   Ht 165.1 cm (65\")   Wt 75.3 kg (165 lb 14.4 oz)   BMI 27.61 kg/m²       Physical Exam  Vitals reviewed.   Constitutional:       Appearance: Normal appearance.   Musculoskeletal:      Cervical back: No tenderness. No pain with movement. Decreased range of motion.   Neurological:      Mental Status: He is alert and oriented to person, place, and time.      Motor: Motor strength is normal.      Gait: Gait is intact.      Deep Tendon Reflexes:      Reflex Scores:       Tricep reflexes are 2+ on the right side and 2+ on the left side.       Bicep reflexes are 2+ on the right side and 2+ on the left side.       Brachioradialis reflexes are 2+ on the right side and 2+ on the left side.       Neurologic Exam     Mental Status   Oriented to person, place, and time.   Level of consciousness: alert    Motor Exam   Muscle bulk: normal  Overall muscle tone: normal    Strength   Strength 5/5 throughout.     Sensory Exam   Light touch normal.     Gait, Coordination, and Reflexes     Gait  Gait: normal    Reflexes   Right brachioradialis: 2+  Left brachioradialis: 2+  Right biceps: 2+  Left biceps: 2+  Right triceps: 2+  Left triceps: 2+  Right Sutton: absent  Left Sutton: absent       Result Review :       Data reviewed: Radiologic studies MRI Cervical Spine on 4/7/23 at [facility] personally reviewed. Multilevel spondylosis, resulting in varying degrees of spinal canal and foraminal narrowing. No high grade spinal canal or foraminal stenosis. No cord signal change.      US Carotids demonstrating mild to moderate carotid bulb bifurcation plaque bilaterally, otherwise normal.     HCT on 12/16/22 demonstrating mild atrophy with white matter changes. No acute intracranial abnormality.         Assessment and Plan    Diagnoses and all orders for this visit:    1. Cervical spondylosis without myelopathy (Primary)    2. Dizzy spells    Pt presenting for evaluation of neck pain and dizzy spells. We reviewed his MRI Cervical " Spine, demonstrating multilevel spondylosis. We discussed that this would contribute to neck pain though would not explain the dizzy spells he is describing. He is scheduled to see Neurology for further evaluation.     We discussed the importance of strengthening, routine stretching, avoidance of activities that worsen the pain, nicotine cessation and maintenance of healthy weight. Surgery for patients with multilevel degenerative disc disease is not typically successful with the risks outweighing the benefit.     We discussed a course of physical therapy for his neck pain. He declines.     He will follow up as needed.    I spent 40 minutes caring for Jonathan on this date of service. This time includes time spent by me in the following activities:preparing for the visit, reviewing tests, obtaining and/or reviewing a separately obtained history, performing a medically appropriate examination and/or evaluation , counseling and educating the patient/family/caregiver, documenting information in the medical record and independently interpreting results and communicating that information with the patient/family/caregiver.    Follow Up   Return if symptoms worsen or fail to improve.  Patient was given instructions and counseling regarding his condition or for health maintenance advice.     -Follow up as needed

## 2023-05-18 ENCOUNTER — OFFICE VISIT (OUTPATIENT)
Dept: FAMILY MEDICINE CLINIC | Facility: CLINIC | Age: 68
End: 2023-05-18
Payer: COMMERCIAL

## 2023-05-18 ENCOUNTER — LAB (OUTPATIENT)
Dept: LAB | Facility: HOSPITAL | Age: 68
End: 2023-05-18
Payer: MEDICARE

## 2023-05-18 VITALS
RESPIRATION RATE: 16 BRPM | TEMPERATURE: 97.7 F | SYSTOLIC BLOOD PRESSURE: 138 MMHG | HEIGHT: 65 IN | WEIGHT: 160.3 LBS | DIASTOLIC BLOOD PRESSURE: 62 MMHG | OXYGEN SATURATION: 98 % | HEART RATE: 72 BPM | BODY MASS INDEX: 26.71 KG/M2

## 2023-05-18 DIAGNOSIS — I10 PRIMARY HYPERTENSION: Primary | ICD-10-CM

## 2023-05-18 DIAGNOSIS — I10 PRIMARY HYPERTENSION: ICD-10-CM

## 2023-05-18 DIAGNOSIS — J43.9 PULMONARY EMPHYSEMA, UNSPECIFIED EMPHYSEMA TYPE: ICD-10-CM

## 2023-05-18 DIAGNOSIS — Z00.00 ANNUAL PHYSICAL EXAM: ICD-10-CM

## 2023-05-18 DIAGNOSIS — Z87.891 FORMER SMOKER: ICD-10-CM

## 2023-05-18 DIAGNOSIS — N18.32 STAGE 3B CHRONIC KIDNEY DISEASE: Primary | ICD-10-CM

## 2023-05-18 DIAGNOSIS — N18.31 STAGE 3A CHRONIC KIDNEY DISEASE: ICD-10-CM

## 2023-05-18 DIAGNOSIS — N18.9 CHRONIC KIDNEY DISEASE, UNSPECIFIED CKD STAGE: ICD-10-CM

## 2023-05-18 DIAGNOSIS — Z13.6 SCREENING FOR AAA (ABDOMINAL AORTIC ANEURYSM): ICD-10-CM

## 2023-05-18 LAB
ALBUMIN SERPL-MCNC: 4.7 G/DL (ref 3.5–5.2)
ALBUMIN/GLOB SERPL: 1.9 G/DL
ALP SERPL-CCNC: 157 U/L (ref 39–117)
ALT SERPL W P-5'-P-CCNC: 17 U/L (ref 1–41)
ANION GAP SERPL CALCULATED.3IONS-SCNC: 10 MMOL/L (ref 5–15)
AST SERPL-CCNC: 23 U/L (ref 1–40)
BASOPHILS # BLD AUTO: 0.05 10*3/MM3 (ref 0–0.2)
BASOPHILS NFR BLD AUTO: 0.6 % (ref 0–1.5)
BILIRUB SERPL-MCNC: 0.5 MG/DL (ref 0–1.2)
BUN SERPL-MCNC: 34 MG/DL (ref 8–23)
BUN/CREAT SERPL: 17.1 (ref 7–25)
CALCIUM SPEC-SCNC: 9.1 MG/DL (ref 8.6–10.5)
CHLORIDE SERPL-SCNC: 105 MMOL/L (ref 98–107)
CHOLEST SERPL-MCNC: 102 MG/DL (ref 0–200)
CO2 SERPL-SCNC: 22 MMOL/L (ref 22–29)
CREAT SERPL-MCNC: 1.99 MG/DL (ref 0.76–1.27)
DEPRECATED RDW RBC AUTO: 40.3 FL (ref 37–54)
EGFRCR SERPLBLD CKD-EPI 2021: 36.6 ML/MIN/1.73
EOSINOPHIL # BLD AUTO: 0.14 10*3/MM3 (ref 0–0.4)
EOSINOPHIL NFR BLD AUTO: 1.7 % (ref 0.3–6.2)
ERYTHROCYTE [DISTWIDTH] IN BLOOD BY AUTOMATED COUNT: 11.6 % (ref 12.3–15.4)
GLOBULIN UR ELPH-MCNC: 2.5 GM/DL
GLUCOSE SERPL-MCNC: 104 MG/DL (ref 65–99)
HBA1C MFR BLD: 5.7 % (ref 4.8–5.6)
HCT VFR BLD AUTO: 39.5 % (ref 37.5–51)
HDLC SERPL-MCNC: 42 MG/DL (ref 40–60)
HGB BLD-MCNC: 13.2 G/DL (ref 13–17.7)
IMM GRANULOCYTES # BLD AUTO: 0.03 10*3/MM3 (ref 0–0.05)
IMM GRANULOCYTES NFR BLD AUTO: 0.4 % (ref 0–0.5)
LDLC SERPL CALC-MCNC: 43 MG/DL (ref 0–100)
LDLC/HDLC SERPL: 1 {RATIO}
LYMPHOCYTES # BLD AUTO: 1.88 10*3/MM3 (ref 0.7–3.1)
LYMPHOCYTES NFR BLD AUTO: 22.3 % (ref 19.6–45.3)
MCH RBC QN AUTO: 32 PG (ref 26.6–33)
MCHC RBC AUTO-ENTMCNC: 33.4 G/DL (ref 31.5–35.7)
MCV RBC AUTO: 95.9 FL (ref 79–97)
MONOCYTES # BLD AUTO: 0.73 10*3/MM3 (ref 0.1–0.9)
MONOCYTES NFR BLD AUTO: 8.7 % (ref 5–12)
NEUTROPHILS NFR BLD AUTO: 5.59 10*3/MM3 (ref 1.7–7)
NEUTROPHILS NFR BLD AUTO: 66.3 % (ref 42.7–76)
NRBC BLD AUTO-RTO: 0 /100 WBC (ref 0–0.2)
PLATELET # BLD AUTO: 182 10*3/MM3 (ref 140–450)
PMV BLD AUTO: 11.7 FL (ref 6–12)
POTASSIUM SERPL-SCNC: 3.8 MMOL/L (ref 3.5–5.2)
PROT SERPL-MCNC: 7.2 G/DL (ref 6–8.5)
RBC # BLD AUTO: 4.12 10*6/MM3 (ref 4.14–5.8)
SODIUM SERPL-SCNC: 137 MMOL/L (ref 136–145)
TRIGL SERPL-MCNC: 89 MG/DL (ref 0–150)
TSH SERPL DL<=0.05 MIU/L-ACNC: 1.45 UIU/ML (ref 0.27–4.2)
VLDLC SERPL-MCNC: 17 MG/DL (ref 5–40)
WBC NRBC COR # BLD: 8.42 10*3/MM3 (ref 3.4–10.8)

## 2023-05-18 PROCEDURE — 85025 COMPLETE CBC W/AUTO DIFF WBC: CPT

## 2023-05-18 PROCEDURE — 80053 COMPREHEN METABOLIC PANEL: CPT

## 2023-05-18 PROCEDURE — 84443 ASSAY THYROID STIM HORMONE: CPT

## 2023-05-18 PROCEDURE — 36415 COLL VENOUS BLD VENIPUNCTURE: CPT

## 2023-05-18 PROCEDURE — 80061 LIPID PANEL: CPT

## 2023-05-18 PROCEDURE — 83036 HEMOGLOBIN GLYCOSYLATED A1C: CPT

## 2023-05-18 RX ORDER — BUDESONIDE, GLYCOPYRROLATE, AND FORMOTEROL FUMARATE 160; 9; 4.8 UG/1; UG/1; UG/1
2 AEROSOL, METERED RESPIRATORY (INHALATION) 2 TIMES DAILY
Qty: 10.7 G | Refills: 11 | Status: SHIPPED | OUTPATIENT
Start: 2023-05-18

## 2023-05-18 NOTE — PROGRESS NOTES
"Chief Complaint  Following up on hypertension/COPD/CKD    Subjective         Jonathan Guerra is a 67 y.o. male who presents to [facility]    67 years old comes to the clinic today to follow-up.    Hypertension; reports compliance with medications and controlled    COPD; compliant with inhalers and reports no chest pain or shortness of breath on exertion.    CKD stage III; patient is aware to avoid NSAIDs, still pending blood work.    Denies any chest pain or shortness of breath on exertion.  No more dizziness as per patient    12+ review of systems are unremarkable otherwise    Objective   Vital Signs:   Vitals:    05/18/23 0702   BP: 138/62   BP Location: Left arm   Patient Position: Sitting   Cuff Size: Large Adult   Pulse: 72   Resp: 16   Temp: 97.7 °F (36.5 °C)   TempSrc: Temporal   SpO2: 98%   Weight: 72.7 kg (160 lb 4.8 oz)   Height: 165.1 cm (65\")      Body mass index is 26.68 kg/m².   Wt Readings from Last 3 Encounters:   05/18/23 72.7 kg (160 lb 4.8 oz)   04/26/23 75.3 kg (165 lb 14.4 oz)   04/06/23 75.1 kg (165 lb 9.6 oz)      BP Readings from Last 3 Encounters:   05/18/23 138/62   04/26/23 145/97   04/06/23 134/63        Patient Care Team:  Casey Mosley MD as PCP - General (Family Medicine)     Physical Exam  Vitals reviewed.   Constitutional:       Appearance: Normal appearance. He is well-developed.   HENT:      Head: Normocephalic and atraumatic.      Right Ear: External ear normal.      Left Ear: External ear normal.      Mouth/Throat:      Pharynx: No oropharyngeal exudate.   Eyes:      Conjunctiva/sclera: Conjunctivae normal.      Pupils: Pupils are equal, round, and reactive to light.   Cardiovascular:      Rate and Rhythm: Normal rate and regular rhythm.      Heart sounds: No murmur heard.    No friction rub. No gallop.   Pulmonary:      Effort: Pulmonary effort is normal.      Breath sounds: Normal breath sounds. No wheezing or rhonchi.   Abdominal:      " General: Bowel sounds are normal. There is no distension.      Palpations: Abdomen is soft.      Tenderness: There is no abdominal tenderness.   Skin:     General: Skin is warm and dry.   Neurological:      Mental Status: He is alert and oriented to person, place, and time.      Cranial Nerves: No cranial nerve deficit.   Psychiatric:         Mood and Affect: Mood and affect normal.         Behavior: Behavior normal.         Thought Content: Thought content normal.         Judgment: Judgment normal.                   Assessment and Plan   Diagnoses and all orders for this visit:    1. Primary hypertension (Primary)  Comments:  Chronic, controlled on lisinopril/hydrochlorothiazide.  DASH diet recommended, continue with current medications    2. Pulmonary emphysema, unspecified emphysema type  Comments:  Chronic, controlled, continue with Breztri as prescribed.  Orders:  -     Budeson-Glycopyrrol-Formoterol (Breztri Aerosphere) 160-9-4.8 MCG/ACT aerosol inhaler; Inhale 2 puffs 2 (Two) Times a Day.  Dispense: 10.7 g; Refill: 11    3. Stage 3a chronic kidney disease  Comments:  Chronic, new blood work needed for follow-up.  Orders:  -     Basic metabolic panel; Future          Tobacco Use: Medium Risk   • Smoking Tobacco Use: Former   • Smokeless Tobacco Use: Never   • Passive Exposure: Not on file            Follow Up   Return in about 3 months (around 8/18/2023) for Recheck.  Patient was given instructions and counseling regarding his condition or for health maintenance advice. Please see specific information pulled into the AVS if appropriate.

## 2023-05-19 ENCOUNTER — TELEPHONE (OUTPATIENT)
Dept: CARDIOLOGY | Facility: CLINIC | Age: 68
End: 2023-05-19
Payer: MEDICARE

## 2023-05-19 NOTE — TELEPHONE ENCOUNTER
----- Message from Minna Lan MA sent at 5/19/2023  1:01 PM EDT -----    ----- Message -----  From: Barbara Epperson APRN  Sent: 5/18/2023   4:35 PM EDT  To: Saida Curtis MA    Chemistry panel shows his creatinine remains elevated, please let him know I recommend we have him evaluated by nephrology.  If he is agreeable to this, we can place referral order.

## 2023-05-19 NOTE — TELEPHONE ENCOUNTER
Spoke to pt who verbalized understanding results.      He is agreeable to nephrology referral.  I let him know that once the referral is ordered, that office would call with an appt.

## 2023-06-08 ENCOUNTER — OFFICE VISIT (OUTPATIENT)
Dept: FAMILY MEDICINE CLINIC | Facility: CLINIC | Age: 68
End: 2023-06-08
Payer: MEDICARE

## 2023-06-08 VITALS
BODY MASS INDEX: 26.98 KG/M2 | OXYGEN SATURATION: 98 % | TEMPERATURE: 97.5 F | WEIGHT: 161.9 LBS | RESPIRATION RATE: 16 BRPM | HEART RATE: 83 BPM | HEIGHT: 65 IN | SYSTOLIC BLOOD PRESSURE: 130 MMHG | DIASTOLIC BLOOD PRESSURE: 58 MMHG

## 2023-06-08 DIAGNOSIS — I10 PRIMARY HYPERTENSION: ICD-10-CM

## 2023-06-08 DIAGNOSIS — N18.32 STAGE 3B CHRONIC KIDNEY DISEASE: Primary | ICD-10-CM

## 2023-06-08 DIAGNOSIS — J43.9 PULMONARY EMPHYSEMA, UNSPECIFIED EMPHYSEMA TYPE: ICD-10-CM

## 2023-06-08 DIAGNOSIS — K21.9 GASTROESOPHAGEAL REFLUX DISEASE WITHOUT ESOPHAGITIS: ICD-10-CM

## 2023-06-08 DIAGNOSIS — R91.1 PULMONARY NODULE: ICD-10-CM

## 2023-06-08 RX ORDER — ATORVASTATIN CALCIUM 80 MG/1
80 TABLET, FILM COATED ORAL DAILY
Qty: 90 TABLET | Refills: 1 | Status: SHIPPED | OUTPATIENT
Start: 2023-06-08

## 2023-06-08 RX ORDER — FAMOTIDINE 40 MG/1
40 TABLET, FILM COATED ORAL DAILY
Qty: 90 TABLET | Refills: 1 | Status: SHIPPED | OUTPATIENT
Start: 2023-06-08

## 2023-06-08 NOTE — PROGRESS NOTES
"Chief Complaint  Following up on recent blood work/hypertension/CKD    Subjective         Jonathan Guerra is a 67 y.o. male who presents to [facility]    67 years old comes to the clinic today for hypertension/CKD/medications.    Patient missed chest CT appointment last week, patient to reschedule to follow-up on pulmonary nodule/lung cancer screening.    COPD is controlled on current inhalers, physically active somewhat.    Hypertension is controlled on lisinopril/hydrochlorothiazide.    CKD stage III; slowly worsening, denies any NSAIDs use.    Reports no chest pain or shortness of breath on exertion    Objective   Vital Signs:   Vitals:    06/08/23 0659   BP: 130/58   BP Location: Left arm   Patient Position: Sitting   Cuff Size: Large Adult   Pulse: 83   Resp: 16   Temp: 97.5 °F (36.4 °C)   TempSrc: Temporal   SpO2: 98%   Weight: 73.4 kg (161 lb 14.4 oz)   Height: 165.1 cm (65\")      Body mass index is 26.94 kg/m².   Wt Readings from Last 3 Encounters:   06/08/23 73.4 kg (161 lb 14.4 oz)   05/18/23 72.7 kg (160 lb 4.8 oz)   04/26/23 75.3 kg (165 lb 14.4 oz)      BP Readings from Last 3 Encounters:   06/08/23 130/58   05/18/23 138/62   04/26/23 145/97        Patient Care Team:  Casey Mosley MD as PCP - General (Family Medicine)     Physical Exam  Vitals reviewed.   Constitutional:       Appearance: Normal appearance. He is well-developed.   HENT:      Head: Normocephalic and atraumatic.      Right Ear: External ear normal.      Left Ear: External ear normal.      Mouth/Throat:      Pharynx: No oropharyngeal exudate.   Eyes:      Conjunctiva/sclera: Conjunctivae normal.      Pupils: Pupils are equal, round, and reactive to light.   Cardiovascular:      Rate and Rhythm: Normal rate and regular rhythm.      Heart sounds: No murmur heard.    No friction rub. No gallop.   Pulmonary:      Effort: Pulmonary effort is normal.      Breath sounds: Normal breath sounds. No wheezing or " rhonchi.   Abdominal:      General: Bowel sounds are normal. There is no distension.      Palpations: Abdomen is soft.      Tenderness: There is no abdominal tenderness.   Skin:     General: Skin is warm and dry.   Neurological:      Mental Status: He is alert and oriented to person, place, and time.      Cranial Nerves: No cranial nerve deficit.   Psychiatric:         Mood and Affect: Mood and affect normal.         Behavior: Behavior normal.         Thought Content: Thought content normal.         Judgment: Judgment normal.                     Assessment and Plan   Diagnoses and all orders for this visit:    1. Stage 3b chronic kidney disease (Primary)  Comments:  Worsening, will stop omeprazole, patient to avoid NSAIDs/ibuprofen, hydration discussed, repeat blood work in 1 month  Orders:  -     Basic metabolic panel; Future    2. Primary hypertension  Comments:  Chronic, controlled on lisinopril/hydrochlorothiazide.  DASH diet recommended    3. Pulmonary emphysema, unspecified emphysema type  Comments:  Continue with current inhalers    4. Pulmonary nodule  Comments:  missed appt of CT last week, pt to [facility]    5. Gastroesophageal reflux disease without esophagitis  Comments:  Stop omeprazole, start Pepcid  Orders:  -     famotidine (Pepcid) 40 MG tablet; Take 1 tablet by mouth Daily.  Dispense: 90 tablet; Refill: 1          Tobacco Use: Medium Risk    Smoking Tobacco Use: Former    Smokeless Tobacco Use: Never    Passive Exposure: Not on file            Follow Up   Return in about 5 months (around 11/8/2023) for Next scheduled follow up.  Patient was given instructions and counseling regarding his condition or for health maintenance advice. Please see specific information pulled into the AVS if appropriate.

## 2023-06-13 ENCOUNTER — OFFICE VISIT (OUTPATIENT)
Dept: NEUROLOGY | Facility: CLINIC | Age: 68
End: 2023-06-13
Payer: MEDICARE

## 2023-06-13 DIAGNOSIS — R42 DIZZINESS: Primary | ICD-10-CM

## 2023-06-13 PROCEDURE — 1160F RVW MEDS BY RX/DR IN RCRD: CPT | Performed by: PSYCHIATRY & NEUROLOGY

## 2023-06-13 PROCEDURE — 1159F MED LIST DOCD IN RCRD: CPT | Performed by: PSYCHIATRY & NEUROLOGY

## 2023-06-13 PROCEDURE — 99203 OFFICE O/P NEW LOW 30 MIN: CPT | Performed by: PSYCHIATRY & NEUROLOGY

## 2023-06-13 NOTE — PROGRESS NOTES
Chief Complaint  No chief complaint on file.    Subjective          Jonathan Guerra is a 67 y.o. male who presents to [facility]  History of Present Illness  67-year-old man evaluated for dizziness.  He states that he has been dizzy for the last 5 months but is gotten better in the last 3 months.  Used to be happen twice a day and it standing 100% of the time.  It lasts 1 second.  It usually happens from sitting to standing and then it goes away.  He states in the last 3 months is happening once or twice every 2 to 3 days.  He does not have any blurring of vision and there is no weakness.  He is lightheaded.  He states that about 5 months ago he fell while washing dishes.  He got dizzy at that time.  It has not happened again.  He does not have any abnormal to symptoms, staring spells.  He knows what is going all the time.  He states that his wife is able to talk to him and he can talk back to his wife and remembers everything.  He does not have any events in which there is a loss of recollection for time and events.  It has never happened while he was sitting down or lying down.  He had a CT scan of brain which is unremarkable.  There are no triggers.  He has never had a seizure in the past.    Objective   Vital Signs:   There were no vitals taken for this visit.    Physical Exam   He is alert, fluent, phasic, follows commands well.  Optic disc are normal bilaterally, visual fields full confrontation, EOMs full all directions gaze, facial strength is full, soft palate elevation and tongue normal.  There is no weakness of the upper or lower extremities on individual muscle testing.  Reflexes are normoactive in the biceps, triceps, patellar's and ankles not tested.  Station gait he has difficulty with tiptoe and heel walk because of ankle problems.  Otherwise Station gait is unremarkable with normal arm swing and turning is intact.  Heart is regular and rhythm normal in  rate.        Assessment and Plan  Diagnoses and all orders for this visit:    1. Dizziness (Primary)  Assessment & Plan:  I discussed with him that his dizziness that occurs 100% getting up from a sitting to standing position is most likely orthostatic changes and is now occurring infrequently.  I discussed with him that he does not have any evidence for seizures or neurological disease and therefore no further testing is needed from my point of view at this time.  I would recommend for tilt table to to be performed and refer him to cardiology in the future should have recurrent syncopal episodes.  Thank you for letting me participate in his care.           Total time spent with the patient and coordinating patient care was 35 minutes.    Follow Up  No follow-ups on file.  Patient was given instructions and counseling regarding his condition or for health maintenance advice. Please see specific information pulled into the AVS if appropriate.

## 2023-06-13 NOTE — ASSESSMENT & PLAN NOTE
I discussed with him that his dizziness that occurs 100% getting up from a sitting to standing position is most likely orthostatic changes and is now occurring infrequently.  I discussed with him that he does not have any evidence for seizures or neurological disease and therefore no further testing is needed from my point of view at this time.  I would recommend for tilt table to to be performed and refer him to cardiology in the future should have recurrent syncopal episodes.  Thank you for letting me participate in his care.

## 2023-07-31 ENCOUNTER — HOSPITAL ENCOUNTER (EMERGENCY)
Facility: HOSPITAL | Age: 68
Discharge: HOME | End: 2023-07-31
Attending: EMERGENCY MEDICINE | Admitting: EMERGENCY MEDICINE
Payer: MEDICARE

## 2023-07-31 ENCOUNTER — APPOINTMENT (OUTPATIENT)
Dept: CT IMAGING | Facility: HOSPITAL | Age: 68
End: 2023-07-31
Payer: MEDICARE

## 2023-07-31 VITALS
DIASTOLIC BLOOD PRESSURE: 57 MMHG | SYSTOLIC BLOOD PRESSURE: 129 MMHG | BODY MASS INDEX: 26.81 KG/M2 | HEIGHT: 65 IN | WEIGHT: 160.94 LBS | RESPIRATION RATE: 16 BRPM | OXYGEN SATURATION: 90 % | TEMPERATURE: 98.5 F | HEART RATE: 81 BPM

## 2023-07-31 DIAGNOSIS — R10.9 FLANK PAIN: ICD-10-CM

## 2023-07-31 DIAGNOSIS — R10.31 RIGHT LOWER QUADRANT ABDOMINAL PAIN: Primary | ICD-10-CM

## 2023-07-31 DIAGNOSIS — R79.89 ELEVATED SERUM CREATININE: ICD-10-CM

## 2023-07-31 LAB
ALBUMIN SERPL-MCNC: 4.5 G/DL (ref 3.5–5.2)
ALBUMIN/GLOB SERPL: 1.8 G/DL
ALP SERPL-CCNC: 136 U/L (ref 39–117)
ALT SERPL W P-5'-P-CCNC: 14 U/L (ref 1–41)
ANION GAP SERPL CALCULATED.3IONS-SCNC: 13.9 MMOL/L (ref 5–15)
AST SERPL-CCNC: 18 U/L (ref 1–40)
BASOPHILS # BLD AUTO: 0.04 10*3/MM3 (ref 0–0.2)
BASOPHILS NFR BLD AUTO: 0.3 % (ref 0–1.5)
BILIRUB SERPL-MCNC: 0.6 MG/DL (ref 0–1.2)
BILIRUB UR QL STRIP: NEGATIVE
BUN SERPL-MCNC: 24 MG/DL (ref 8–23)
BUN/CREAT SERPL: 9.5 (ref 7–25)
CALCIUM SPEC-SCNC: 9.1 MG/DL (ref 8.6–10.5)
CHLORIDE SERPL-SCNC: 98 MMOL/L (ref 98–107)
CLARITY UR: CLEAR
CO2 SERPL-SCNC: 24.1 MMOL/L (ref 22–29)
COLOR UR: YELLOW
CREAT SERPL-MCNC: 2.52 MG/DL (ref 0.76–1.27)
D-LACTATE SERPL-SCNC: 1.3 MMOL/L (ref 0.5–2)
DEPRECATED RDW RBC AUTO: 41.5 FL (ref 37–54)
EGFRCR SERPLBLD CKD-EPI 2021: 27.6 ML/MIN/1.73
EOSINOPHIL # BLD AUTO: 0.35 10*3/MM3 (ref 0–0.4)
EOSINOPHIL NFR BLD AUTO: 2.7 % (ref 0.3–6.2)
ERYTHROCYTE [DISTWIDTH] IN BLOOD BY AUTOMATED COUNT: 11.9 % (ref 12.3–15.4)
GLOBULIN UR ELPH-MCNC: 2.5 GM/DL
GLUCOSE SERPL-MCNC: 80 MG/DL (ref 65–99)
GLUCOSE UR STRIP-MCNC: NEGATIVE MG/DL
HCT VFR BLD AUTO: 34.8 % (ref 37.5–51)
HGB BLD-MCNC: 11.7 G/DL (ref 13–17.7)
HGB UR QL STRIP.AUTO: NEGATIVE
HOLD SPECIMEN: NORMAL
HOLD SPECIMEN: NORMAL
IMM GRANULOCYTES # BLD AUTO: 0.07 10*3/MM3 (ref 0–0.05)
IMM GRANULOCYTES NFR BLD AUTO: 0.5 % (ref 0–0.5)
KETONES UR QL STRIP: NEGATIVE
LEUKOCYTE ESTERASE UR QL STRIP.AUTO: NEGATIVE
LIPASE SERPL-CCNC: 31 U/L (ref 13–60)
LYMPHOCYTES # BLD AUTO: 2.72 10*3/MM3 (ref 0.7–3.1)
LYMPHOCYTES NFR BLD AUTO: 21.3 % (ref 19.6–45.3)
MCH RBC QN AUTO: 31.9 PG (ref 26.6–33)
MCHC RBC AUTO-ENTMCNC: 33.6 G/DL (ref 31.5–35.7)
MCV RBC AUTO: 94.8 FL (ref 79–97)
MONOCYTES # BLD AUTO: 1.13 10*3/MM3 (ref 0.1–0.9)
MONOCYTES NFR BLD AUTO: 8.9 % (ref 5–12)
NEUTROPHILS NFR BLD AUTO: 66.3 % (ref 42.7–76)
NEUTROPHILS NFR BLD AUTO: 8.45 10*3/MM3 (ref 1.7–7)
NITRITE UR QL STRIP: NEGATIVE
NRBC BLD AUTO-RTO: 0 /100 WBC (ref 0–0.2)
PH UR STRIP.AUTO: 6 [PH] (ref 5–8)
PLATELET # BLD AUTO: 156 10*3/MM3 (ref 140–450)
PMV BLD AUTO: 11.4 FL (ref 6–12)
POTASSIUM SERPL-SCNC: 3.7 MMOL/L (ref 3.5–5.2)
PROT SERPL-MCNC: 7 G/DL (ref 6–8.5)
PROT UR QL STRIP: NEGATIVE
RBC # BLD AUTO: 3.67 10*6/MM3 (ref 4.14–5.8)
SODIUM SERPL-SCNC: 136 MMOL/L (ref 136–145)
SP GR UR STRIP: <=1.005 (ref 1–1.03)
UROBILINOGEN UR QL STRIP: NORMAL
WBC NRBC COR # BLD: 12.76 10*3/MM3 (ref 3.4–10.8)
WHOLE BLOOD HOLD COAG: NORMAL
WHOLE BLOOD HOLD SPECIMEN: NORMAL

## 2023-07-31 PROCEDURE — 83605 ASSAY OF LACTIC ACID: CPT | Performed by: EMERGENCY MEDICINE

## 2023-07-31 PROCEDURE — 74176 CT ABD & PELVIS W/O CONTRAST: CPT

## 2023-07-31 PROCEDURE — 96375 TX/PRO/DX INJ NEW DRUG ADDON: CPT

## 2023-07-31 PROCEDURE — 99284 EMERGENCY DEPT VISIT MOD MDM: CPT

## 2023-07-31 PROCEDURE — 83690 ASSAY OF LIPASE: CPT | Performed by: EMERGENCY MEDICINE

## 2023-07-31 PROCEDURE — 25010000002 ONDANSETRON PER 1 MG

## 2023-07-31 PROCEDURE — 80053 COMPREHEN METABOLIC PANEL: CPT | Performed by: EMERGENCY MEDICINE

## 2023-07-31 PROCEDURE — 85025 COMPLETE CBC W/AUTO DIFF WBC: CPT | Performed by: EMERGENCY MEDICINE

## 2023-07-31 PROCEDURE — 96374 THER/PROPH/DIAG INJ IV PUSH: CPT

## 2023-07-31 PROCEDURE — 36415 COLL VENOUS BLD VENIPUNCTURE: CPT | Performed by: EMERGENCY MEDICINE

## 2023-07-31 PROCEDURE — 25010000002 KETOROLAC TROMETHAMINE PER 15 MG

## 2023-07-31 PROCEDURE — 81003 URINALYSIS AUTO W/O SCOPE: CPT | Performed by: EMERGENCY MEDICINE

## 2023-07-31 PROCEDURE — 96361 HYDRATE IV INFUSION ADD-ON: CPT

## 2023-07-31 RX ORDER — KETOROLAC TROMETHAMINE 30 MG/ML
30 INJECTION, SOLUTION INTRAMUSCULAR; INTRAVENOUS ONCE
Status: COMPLETED | OUTPATIENT
Start: 2023-07-31 | End: 2023-07-31

## 2023-07-31 RX ORDER — ONDANSETRON 2 MG/ML
4 INJECTION INTRAMUSCULAR; INTRAVENOUS ONCE
Status: COMPLETED | OUTPATIENT
Start: 2023-07-31 | End: 2023-07-31

## 2023-07-31 RX ORDER — SODIUM CHLORIDE 0.9 % (FLUSH) 0.9 %
10 SYRINGE (ML) INJECTION AS NEEDED
Status: DISCONTINUED | OUTPATIENT
Start: 2023-07-31 | End: 2023-07-31 | Stop reason: HOSPADM

## 2023-07-31 RX ADMIN — ONDANSETRON 4 MG: 2 INJECTION INTRAMUSCULAR; INTRAVENOUS at 18:50

## 2023-07-31 RX ADMIN — KETOROLAC TROMETHAMINE 30 MG: 30 INJECTION, SOLUTION INTRAMUSCULAR; INTRAVENOUS at 18:50

## 2023-07-31 RX ADMIN — SODIUM CHLORIDE 1000 ML: 9 INJECTION, SOLUTION INTRAVENOUS at 18:49

## 2023-09-05 RX ORDER — CETIRIZINE HYDROCHLORIDE 10 MG/1
10 TABLET ORAL DAILY
Qty: 30 TABLET | Refills: 3 | Status: SHIPPED | OUTPATIENT
Start: 2023-09-05

## 2023-10-05 DIAGNOSIS — J43.9 PULMONARY EMPHYSEMA, UNSPECIFIED EMPHYSEMA TYPE: ICD-10-CM

## 2023-10-05 RX ORDER — ALBUTEROL SULFATE 90 UG/1
2 AEROSOL, METERED RESPIRATORY (INHALATION)
Start: 2023-10-05

## 2023-10-05 RX ORDER — BUDESONIDE, GLYCOPYRROLATE, AND FORMOTEROL FUMARATE 160; 9; 4.8 UG/1; UG/1; UG/1
2 AEROSOL, METERED RESPIRATORY (INHALATION) 2 TIMES DAILY
Qty: 10.7 G | Refills: 11 | Status: SHIPPED | OUTPATIENT
Start: 2023-10-05

## 2023-10-05 NOTE — TELEPHONE ENCOUNTER
Patient requesting refill on his inhalers. States his lung dr is retiring and is wanting to know if dr simpson would fill them for him. Needing albuterol and bretzi inhaler. Medication pended.

## 2023-10-19 ENCOUNTER — OFFICE VISIT (OUTPATIENT)
Dept: PULMONOLOGY | Facility: CLINIC | Age: 68
End: 2023-10-19
Payer: MEDICARE

## 2023-10-19 VITALS
DIASTOLIC BLOOD PRESSURE: 59 MMHG | OXYGEN SATURATION: 99 % | HEIGHT: 65 IN | TEMPERATURE: 99.3 F | WEIGHT: 153.3 LBS | HEART RATE: 80 BPM | BODY MASS INDEX: 25.54 KG/M2 | SYSTOLIC BLOOD PRESSURE: 140 MMHG | RESPIRATION RATE: 18 BRPM

## 2023-10-19 DIAGNOSIS — F17.211 NICOTINE DEPENDENCE, CIGARETTES, IN REMISSION: ICD-10-CM

## 2023-10-19 DIAGNOSIS — Z23 ENCOUNTER FOR IMMUNIZATION: ICD-10-CM

## 2023-10-19 DIAGNOSIS — R91.8 MULTIPLE NODULES OF LUNG: Primary | ICD-10-CM

## 2023-10-19 DIAGNOSIS — J30.2 SEASONAL ALLERGIES: ICD-10-CM

## 2023-10-19 RX ORDER — LISINOPRIL 10 MG/1
TABLET ORAL EVERY 24 HOURS
COMMUNITY

## 2023-10-19 RX ORDER — IPRATROPIUM BROMIDE AND ALBUTEROL 20; 100 UG/1; UG/1
1 SPRAY, METERED RESPIRATORY (INHALATION) EVERY 6 HOURS SCHEDULED
COMMUNITY

## 2023-10-19 RX ORDER — HYDROCHLOROTHIAZIDE 25 MG/1
TABLET ORAL EVERY 24 HOURS
COMMUNITY

## 2023-10-19 NOTE — PROGRESS NOTES
Primary Care Provider  Casey Mosley MD     Referring Provider  Krys Warner MD     Chief Complaint  Establish Care (New Patient), COPD, and Lung Nodule    Subjective          Jonathan Guerra presents to [facility] PULMONARY & CRITICAL CARE MEDICINE  History of Present Illness  Jonathan Guerra is a 67 y.o. male patient here to establish care as a new patient.  He is here for management of pulmonary nodules, tobacco use of cigarettes in remission.    Patient is establishing care from Dr. Warner office.  Patient states that he has been with Dr. Warner for several years.  States he has been doing well over the past several months and has not needed any antibiotics or steroids.  He has been on Breztri for quite some time and is doing well.  He will intermittently use albuterol if needed.  Patient states that he does use his Combivent in the morning and in the afternoon.  His shortness of breath is mild in severity, worse with exertion and improved with rest.  He is a former smoker and has a 60-pack-year history.  Patient was smoking approximately 1.5 packs of cigarettes and quit in 2006.  Patient is aware of his pulmonary nodules and is already enrolled in yearly low-dose lung cancer screening.  He denies any unintentional weight loss or hemoptysis.  Patient's most recent pulmonary function test is from 2/24/2023.  This shows , FEV1 114, FEV1/FVC 85,  RV 67 and DLCO 54%.  Patient's primary care has been ordering his low-dose lung cancer CT scans.  Patient states that overall he is doing well and has no additional concerns at this time.  He is up-to-date with his COVID-vaccine.  He would like to receive a pneumonia vaccine today in office.  Unfortunately, we do not have high-dose flu vaccines today.  I will also send a prescription for RSV to his local pharmacy     His history of smoking is   Tobacco Use: Medium Risk (10/19/2023)    Patient History     Smoking Tobacco Use: Former      Smokeless Tobacco Use: Never     Passive Exposure: Not on file   .    Review of Systems   Constitutional:  Negative for chills, fatigue, fever, unexpected weight gain and unexpected weight loss.   HENT:  Negative for congestion (Nasal), hearing loss, mouth sores, nosebleeds, postnasal drip, sore throat and trouble swallowing.    Respiratory:  Positive for shortness of breath. Negative for apnea, cough and wheezing.         Negative for Hemoptysis     Cardiovascular:  Negative for chest pain, palpitations and leg swelling.   Gastrointestinal:  Negative for constipation, diarrhea, nausea, vomiting and GERD.   Skin:  Negative for color change.        Negative for cyanosis   Neurological:  Negative for syncope, weakness, numbness and headache.      Sleep: Negative for Excessive daytime sleepiness  Negative for morning headaches  Negative for Snoring    Family History   Problem Relation Age of Onset    Other Mother         Thyroid Gland Neoplasm    Heart disease Mother     Colon cancer Mother     Diabetes Sister     Colon cancer Paternal Grandmother         Social History     Socioeconomic History    Marital status: Legally    Tobacco Use    Smoking status: Former     Packs/day: 1.50     Years: 40.00     Additional pack years: 0.00     Total pack years: 60.00     Types: Cigarettes     Quit date:      Years since quittin.8    Smokeless tobacco: Never   Vaping Use    Vaping Use: Never used   Substance and Sexual Activity    Alcohol use: Yes     Comment: Ocassional     Drug use: Never    Sexual activity: Defer        Past Medical History:   Diagnosis Date    Acid reflux     Acid reflux disease     Anemia, unspecified     Anxiety     Arthritis     Asthma     Broken bones     Calcaneus fracture, left     Chronic bronchitis     COPD (chronic obstructive pulmonary disease)     Depression     Diverticulitis     Emphysema lung     Head injury     skull fracture     Hernia cerebri     HTN (hypertension)      Hyperlipemia     Lung disease         Immunization History   Administered Date(s) Administered    COVID-19 (MODERNA) 1st,2nd,3rd Dose Monovalent 04/14/2021, 05/12/2021    Flu Vaccine Quad PF >36MO 11/02/2017    Flu Vaccine Split Quad 10/04/2022    Fluzone (or Fluarix & Flulaval for VFC) >6mos 11/02/2017, 10/11/2021    Fluzone Quad >6mos (Multi-dose) 10/10/2016, 11/02/2017, 09/28/2018    Influenza Quad Vaccine (Inpatient) 10/10/2016    Influenza TIV (IM) 10/22/2012, 11/06/2013, 11/03/2015, 02/18/2020, 10/22/2020    Influenza, Unspecified 10/01/2020, 10/04/2022    Pneumococcal Conjugate 20-Valent (PCV20) 10/19/2023    Pneumococcal Polysaccharide (PPSV23) 08/17/2021    Tdap 08/08/2019         Allergies   Allergen Reactions    Azithromycin Unknown - High Severity    Codeine Unknown - High Severity    Penicillins Unknown - High Severity          Current Outpatient Medications:     albuterol sulfate  (90 Base) MCG/ACT inhaler, Inhale 2 puffs 4 (Four) Times a Day., Disp: , Rfl:     atorvastatin (LIPITOR) 80 MG tablet, TAKE 1 TABLET BY MOUTH DAILY., Disp: 90 tablet, Rfl: 1    Budeson-Glycopyrrol-Formoterol (Breztri Aerosphere) 160-9-4.8 MCG/ACT aerosol inhaler, Inhale 2 puffs 2 (Two) Times a Day., Disp: 10.7 g, Rfl: 11    cetirizine (zyrTEC) 10 MG tablet, TAKE 1 TABLET BY MOUTH DAILY., Disp: 30 tablet, Rfl: 3    hydroCHLOROthiazide (HYDRODIURIL) 25 MG tablet, Daily., Disp: , Rfl:     ipratropium-albuterol (Combivent Respimat)  MCG/ACT inhaler, 1 puff Every 6 (Six) Hours., Disp: , Rfl:     ketotifen (ZADITOR) 0.025 % ophthalmic solution, , Disp: , Rfl:     lisinopril (PRINIVIL,ZESTRIL) 10 MG tablet, Daily., Disp: , Rfl:     metoprolol succinate XL (Toprol XL) 25 MG 24 hr tablet, Take 1 tablet by mouth 2 (two) times a day., Disp: 180 tablet, Rfl: 1    omeprazole (priLOSEC) 20 MG capsule, TAKE 1 CAPSULE BY MOUTH DAILY., Disp: 90 capsule, Rfl: 3    diazePAM (VALIUM) 2 MG tablet, TAKE 1 TABLET BY MOUTH EVERY 12  (TWELVE) HOURS AS NEEDED (DIZZINESS). (Patient not taking: Reported on 10/19/2023), Disp: 15 tablet, Rfl: 0    famotidine (Pepcid) 40 MG tablet, Take 1 tablet by mouth Daily. (Patient not taking: Reported on 10/19/2023), Disp: 90 tablet, Rfl: 1    lisinopril-hydrochlorothiazide (PRINZIDE,ZESTORETIC) 20-12.5 MG per tablet, TAKE 1 TABLET BY MOUTH DAILY. (Patient not taking: Reported on 10/19/2023), Disp: 90 tablet, Rfl: 1    meclizine (ANTIVERT) 25 MG tablet, TAKE 1 TABLET BY MOUTH 3 (THREE) TIMES A DAY AS NEEDED FOR DIZZINESS. (Patient not taking: Reported on 10/19/2023), Disp: 30 tablet, Rfl: 0    RSVPreF3 Vac Recomb Adjuvanted (AREXVY) 120 MCG/0.5ML reconstituted suspension injection, Inject 0.5 mL into the appropriate muscle as directed by prescriber 1 (One) Time for 1 dose., Disp: 0.5 mL, Rfl: 0     Objective   Physical Exam  Constitutional:       General: He is not in acute distress.     Appearance: Normal appearance. He is normal weight.   HENT:      Right Ear: Hearing normal.      Left Ear: Hearing normal.      Nose: No nasal tenderness or congestion.      Mouth/Throat:      Mouth: Mucous membranes are moist. No oral lesions.   Eyes:      Extraocular Movements: Extraocular movements intact.      Pupils: Pupils are equal, round, and reactive to light.   Neck:      Thyroid: No thyroid mass or thyromegaly.   Cardiovascular:      Rate and Rhythm: Normal rate and regular rhythm.      Pulses: Normal pulses.      Heart sounds: Normal heart sounds. No murmur heard.  Pulmonary:      Effort: Pulmonary effort is normal.      Breath sounds: Normal breath sounds. No wheezing, rhonchi or rales.   Musculoskeletal:      Cervical back: Neck supple.      Right lower leg: No edema.      Left lower leg: No edema.   Lymphadenopathy:      Cervical: No cervical adenopathy.      Upper Body:      Right upper body: No axillary adenopathy.   Skin:     General: Skin is warm and dry.      Findings: No lesion or rash.   Neurological:       "General: No focal deficit present.      Mental Status: He is alert and oriented to person, place, and time.   Psychiatric:         Mood and Affect: Affect normal. Mood is not anxious or depressed.         Vital Signs:   /59 (BP Location: Left arm, Patient Position: Sitting, Cuff Size: Adult)   Pulse 80   Temp 99.3 °F (37.4 °C) (Tympanic) Comment: not feeling sick. reports heat was on in vehicle  Resp 18   Ht 165.1 cm (65\")   Wt 69.5 kg (153 lb 4.8 oz)   SpO2 99% Comment: room air  BMI 25.51 kg/m²        Result Review :   The following data was reviewed by: MALLORY Horton on 10/19/2023:  CMP          5/18/2023    07:31 7/7/2023    07:52 7/31/2023    18:17   CMP   Glucose 104  101  80    BUN 34  26  24    Creatinine 1.99  1.93  2.52    EGFR 36.6  37.9  27.6    Sodium 137  140  136    Potassium 3.8  3.6  3.7    Chloride 105  104  98    Calcium 9.1  9.3  9.1    Total Protein 7.2   7.0    Albumin 4.7  3.9  4.5    Globulin 2.5   2.5    Total Bilirubin 0.5   0.6    Alkaline Phosphatase 157   136    AST (SGOT) 23   18    ALT (SGPT) 17   14    Albumin/Globulin Ratio 1.9   1.8    BUN/Creatinine Ratio 17.1  13.5  9.5    Anion Gap 10.0  11.0  13.9      CBC w/diff          5/18/2023    07:31 7/7/2023    07:52 7/31/2023    18:17   CBC w/Diff   WBC 8.42  8.45  12.76    RBC 4.12  3.71  3.67    Hemoglobin 13.2  11.7  11.7    Hematocrit 39.5  35.0  34.8    MCV 95.9  94.3  94.8    MCH 32.0  31.5  31.9    MCHC 33.4  33.4  33.6    RDW 11.6  11.7  11.9    Platelets 182  198  156    Neutrophil Rel % 66.3  68.1  66.3    Immature Granulocyte Rel % 0.4  0.5  0.5    Lymphocyte Rel % 22.3  20.4  21.3    Monocyte Rel % 8.7  8.8  8.9    Eosinophil Rel % 1.7  1.8  2.7    Basophil Rel % 0.6  0.4  0.3      Data reviewed : Radiologic studies chest CT 6/23/2023 ,pulmonary function test 2/24/2023, and Cardiology studies echocardiogram 3/26/2023    Procedures        Assessment and Plan    Diagnoses and all orders for this " visit:    1. Multiple nodules of lung (Primary)    2. Nicotine dependence, cigarettes, in remission    3. Seasonal allergies    4. Encounter for immunization  -     Cancel: Fluzone High-Dose 65+yrs ([id number])  -     Pneumococcal Conjugate Vaccine 20-Valent (PCV20)  -     RSVPreF3 Vac Recomb Adjuvanted (AREXVY) 120 MCG/0.5ML reconstituted suspension injection; Inject 0.5 mL into the appropriate muscle as directed by prescriber 1 (One) Time for 1 dose.  Dispense: 0.5 mL; Refill: 0    5.  Continue Breztri as prescribed.  Rinse mouth after each use.  6.  Continue albuterol or Combivent as needed.  7.  Continue Zyrtec for allergies.    Follow Up   Return in about 3 months (around 1/19/2024) for Recheck with Tanya.  Patient was given instructions and counseling regarding his condition or for health maintenance advice. Please see specific information pulled into the AVS if appropriate.

## 2023-11-08 ENCOUNTER — LAB (OUTPATIENT)
Dept: LAB | Facility: HOSPITAL | Age: 68
End: 2023-11-08
Payer: MEDICARE

## 2023-11-08 ENCOUNTER — OFFICE VISIT (OUTPATIENT)
Dept: FAMILY MEDICINE CLINIC | Facility: CLINIC | Age: 68
End: 2023-11-08
Payer: MEDICARE

## 2023-11-08 VITALS
WEIGHT: 150 LBS | SYSTOLIC BLOOD PRESSURE: 132 MMHG | BODY MASS INDEX: 24.99 KG/M2 | HEIGHT: 65 IN | RESPIRATION RATE: 16 BRPM | TEMPERATURE: 98.4 F | HEART RATE: 86 BPM | OXYGEN SATURATION: 96 % | DIASTOLIC BLOOD PRESSURE: 64 MMHG

## 2023-11-08 DIAGNOSIS — Z11.59 NEED FOR HEPATITIS C SCREENING TEST: ICD-10-CM

## 2023-11-08 DIAGNOSIS — J43.9 PULMONARY EMPHYSEMA, UNSPECIFIED EMPHYSEMA TYPE: ICD-10-CM

## 2023-11-08 DIAGNOSIS — Z12.5 SCREENING FOR PROSTATE CANCER: ICD-10-CM

## 2023-11-08 DIAGNOSIS — N18.31 STAGE 3A CHRONIC KIDNEY DISEASE: ICD-10-CM

## 2023-11-08 DIAGNOSIS — R19.06 EPIGASTRIC LUMP: Primary | ICD-10-CM

## 2023-11-08 DIAGNOSIS — I10 PRIMARY HYPERTENSION: ICD-10-CM

## 2023-11-08 LAB
ANION GAP SERPL CALCULATED.3IONS-SCNC: 10 MMOL/L (ref 5–15)
BUN SERPL-MCNC: 46 MG/DL (ref 8–23)
BUN/CREAT SERPL: 20.4 (ref 7–25)
CALCIUM SPEC-SCNC: 9.3 MG/DL (ref 8.6–10.5)
CHLORIDE SERPL-SCNC: 106 MMOL/L (ref 98–107)
CO2 SERPL-SCNC: 24 MMOL/L (ref 22–29)
CREAT SERPL-MCNC: 2.26 MG/DL (ref 0.76–1.27)
EGFRCR SERPLBLD CKD-EPI 2021: 31.4 ML/MIN/1.73
GLUCOSE SERPL-MCNC: 104 MG/DL (ref 65–99)
HCV AB SER DONR QL: NORMAL
POTASSIUM SERPL-SCNC: 3.9 MMOL/L (ref 3.5–5.2)
PSA SERPL-MCNC: 1.2 NG/ML (ref 0–4)
SODIUM SERPL-SCNC: 140 MMOL/L (ref 136–145)

## 2023-11-08 PROCEDURE — G0103 PSA SCREENING: HCPCS

## 2023-11-08 PROCEDURE — 36415 COLL VENOUS BLD VENIPUNCTURE: CPT

## 2023-11-08 PROCEDURE — 80048 BASIC METABOLIC PNL TOTAL CA: CPT

## 2023-11-08 PROCEDURE — 86803 HEPATITIS C AB TEST: CPT

## 2023-11-08 NOTE — PROGRESS NOTES
"Chief Complaint  Hypertension and Hyperlipidemia    Subjective         Jonathan Guerra is a 67 y.o. male who presents to [facility] FAMILY MEDICINE    67 years old comes in the clinic today to follow-up.    Complaining of mild lump around epigastric area which is nontender/no erythema and not associated with any other symptoms such as nausea/vomiting/fever.  Denies any trauma, noticed few weeks ago.  Reports no worsening    CKD stage III; new blood work needed to follow-up    Hypertension; chronic, controlled on lisinopril/hydrochlorothiazide and metoprolol    12+ review of systems are unremarkable        Objective   Vital Signs:   Vitals:    11/08/23 0712   BP: 132/64   Pulse: 86   Resp: 16   Temp: 98.4 °F (36.9 °C)   SpO2: 96%   Weight: 68 kg (150 lb)   Height: 165.1 cm (65\")      Body mass index is 24.96 kg/m².   Wt Readings from Last 3 Encounters:   11/08/23 68 kg (150 lb)   10/19/23 69.5 kg (153 lb 4.8 oz)   07/31/23 73 kg (160 lb 15 oz)      BP Readings from Last 3 Encounters:   11/08/23 132/64   10/19/23 140/59   07/31/23 129/57        Patient Care Team:  Casey Mosley MD as PCP - General (Family Medicine)  Sumanth Helm MD as Consulting Physician (Nephrology)     Physical Exam  Vitals reviewed.   Constitutional:       Appearance: Normal appearance. He is well-developed.   HENT:      Head: Normocephalic and atraumatic.      Right Ear: External ear normal.      Left Ear: External ear normal.      Mouth/Throat:      Pharynx: No oropharyngeal exudate.   Eyes:      Conjunctiva/sclera: Conjunctivae normal.      Pupils: Pupils are equal, round, and reactive to light.   Cardiovascular:      Rate and Rhythm: Normal rate and regular rhythm.      Heart sounds: No murmur heard.     No friction rub. No gallop.   Pulmonary:      Effort: Pulmonary effort is normal.      Breath sounds: Normal breath sounds. No wheezing or rhonchi.   Abdominal:      General: Bowel sounds are normal. There is no " distension.      Palpations: Abdomen is soft.      Tenderness: There is no abdominal tenderness.          Comments: Small soft tissue growth noted, likely lipoma   Skin:     General: Skin is warm and dry.   Neurological:      Mental Status: He is alert and oriented to person, place, and time.      Cranial Nerves: No cranial nerve deficit.   Psychiatric:         Mood and Affect: Mood and affect normal.         Behavior: Behavior normal.         Thought Content: Thought content normal.         Judgment: Judgment normal.            BMI is within normal parameters. No other follow-up for BMI required.              Assessment and Plan   Diagnoses and all orders for this visit:    1. Epigastric lump (Primary)  Comments:  Soft tissue ultrasound ordered  Orders:  -     US Soft Tissue; Future    2. Stage 3a chronic kidney disease  Comments:  New blood work needed due to mild worsening with last blood work    3. Primary hypertension  Comments:  Chronic, controlled on lisinopril/hydrochlorothiazide.    4. Pulmonary emphysema, unspecified emphysema type    5. Screening for prostate cancer  -     Basic metabolic panel; Future  -     PSA SCREENING; Future    6. Need for hepatitis C screening test  -     Hepatitis C Antibody; Future      Compliance with all the medications discussed    Tobacco Use: Medium Risk (11/8/2023)    Patient History     Smoking Tobacco Use: Former     Smokeless Tobacco Use: Never     Passive Exposure: Past            Follow Up   Return in about 6 months (around 5/8/2024) for Next scheduled follow up.  Patient was given instructions and counseling regarding his condition or for health maintenance advice. Please see specific information pulled into the AVS if appropriate.

## 2023-11-09 DIAGNOSIS — N18.32 STAGE 3B CHRONIC KIDNEY DISEASE: Primary | ICD-10-CM

## 2023-11-14 ENCOUNTER — TELEPHONE (OUTPATIENT)
Dept: FAMILY MEDICINE CLINIC | Facility: CLINIC | Age: 68
End: 2023-11-14
Payer: MEDICARE

## 2023-11-14 NOTE — TELEPHONE ENCOUNTER
Patient wanted to know if he was scheduled for the US. I tried giving the patient the number and he said he would wait

## 2023-11-14 NOTE — TELEPHONE ENCOUNTER
Caller: Jonathan Guerra    Relationship to patient: Self    Best call back number:     [phone] (Home)       Patient is needing: PATIENT CALLED IN AND IS REQUESTING A CALL BACK REGARDING LATEST REFERRALS PLACED BY DR. RAMOS. PATIENT WOULD LIKE DETAILS ON REFERRALS ORDERED

## 2023-11-20 ENCOUNTER — HOSPITAL ENCOUNTER (OUTPATIENT)
Dept: ULTRASOUND IMAGING | Facility: HOSPITAL | Age: 68
Discharge: HOME | End: 2023-11-20
Admitting: STUDENT IN AN ORGANIZED HEALTH CARE EDUCATION/TRAINING PROGRAM
Payer: MEDICARE

## 2023-11-20 DIAGNOSIS — R19.06 EPIGASTRIC LUMP: ICD-10-CM

## 2023-11-20 PROCEDURE — 76999 ECHO EXAMINATION PROCEDURE: CPT

## 2023-11-30 DIAGNOSIS — J43.9 PULMONARY EMPHYSEMA, UNSPECIFIED EMPHYSEMA TYPE: ICD-10-CM

## 2023-11-30 DIAGNOSIS — I10 PRIMARY HYPERTENSION: ICD-10-CM

## 2023-11-30 RX ORDER — BUDESONIDE, GLYCOPYRROLATE, AND FORMOTEROL FUMARATE 160; 9; 4.8 UG/1; UG/1; UG/1
2 AEROSOL, METERED RESPIRATORY (INHALATION) 2 TIMES DAILY
Qty: 10.7 G | Refills: 11 | Status: SHIPPED | OUTPATIENT
Start: 2023-11-30

## 2023-11-30 RX ORDER — ATORVASTATIN CALCIUM 80 MG/1
80 TABLET, FILM COATED ORAL DAILY
Qty: 90 TABLET | Refills: 1 | Status: SHIPPED | OUTPATIENT
Start: 2023-11-30

## 2023-11-30 RX ORDER — LISINOPRIL AND HYDROCHLOROTHIAZIDE 20; 12.5 MG/1; MG/1
1 TABLET ORAL DAILY
Qty: 90 TABLET | Refills: 1 | Status: SHIPPED | OUTPATIENT
Start: 2023-11-30

## 2023-11-30 NOTE — TELEPHONE ENCOUNTER
Caller: Charlie Jonathan R    Relationship: Self    Best call back number: [phone]     Requested Prescriptions:   Requested Prescriptions     Pending Prescriptions Disp Refills    lisinopril-hydrochlorothiazide (PRINZIDE,ZESTORETIC) 20-12.5 MG per tablet 90 tablet 1     Sig: Take 1 tablet by mouth Daily.    atorvastatin (LIPITOR) 80 MG tablet 90 tablet 1     Sig: Take 1 tablet by mouth Daily.    Budeson-Glycopyrrol-Formoterol (Breztri Aerosphere) 160-9-4.8 MCG/ACT aerosol inhaler 10.7 g 11     Sig: Inhale 2 puffs 2 (Two) Times a Day.        Pharmacy where request should be sent: [facility] [place], [place] - [address] [facility].  [phone] [facility] [phone] [facility]     Last office visit with prescribing clinician: 11/8/2023   Last telemedicine visit with prescribing clinician: Visit date not found   Next office visit with prescribing clinician: Visit date not found     Additional details provided by patient: PATIENT HAS LESS THAN A 3 DAY SUPPLY ON LIPITOR.    Does the patient have less than a 3 day supply:  [x] Yes  [] No    Would you like a call back once the refill request has been completed: [] Yes [] No    If the office needs to give you a call back, can they leave a voicemail: [] Yes [] No    Melisa Bejarano Rep   11/30/23 11:21 EST

## 2024-01-05 RX ORDER — CETIRIZINE HYDROCHLORIDE 10 MG/1
10 TABLET ORAL DAILY
Qty: 90 TABLET | Refills: 3 | Status: SHIPPED | OUTPATIENT
Start: 2024-01-05

## 2024-02-21 ENCOUNTER — TRANSCRIBE ORDERS (OUTPATIENT)
Dept: ADMINISTRATIVE | Facility: HOSPITAL | Age: 69
End: 2024-02-21
Payer: MEDICARE

## 2024-02-21 ENCOUNTER — LAB (OUTPATIENT)
Dept: LAB | Facility: HOSPITAL | Age: 69
End: 2024-02-21
Payer: COMMERCIAL

## 2024-02-21 DIAGNOSIS — I10 ESSENTIAL (PRIMARY) HYPERTENSION: ICD-10-CM

## 2024-02-21 DIAGNOSIS — N18.30 STAGE 3 CHRONIC KIDNEY DISEASE, UNSPECIFIED WHETHER STAGE 3A OR 3B CKD: ICD-10-CM

## 2024-02-21 DIAGNOSIS — N18.30 STAGE 3 CHRONIC KIDNEY DISEASE, UNSPECIFIED WHETHER STAGE 3A OR 3B CKD: Primary | ICD-10-CM

## 2024-02-21 LAB
ALBUMIN SERPL-MCNC: 4.5 G/DL (ref 3.5–5.2)
ALBUMIN UR-MCNC: <1.2 MG/DL
ALBUMIN/GLOB SERPL: 2.4 G/DL
ALP SERPL-CCNC: 118 U/L (ref 39–117)
ALT SERPL W P-5'-P-CCNC: 19 U/L (ref 1–41)
ANION GAP SERPL CALCULATED.3IONS-SCNC: 11 MMOL/L (ref 5–15)
AST SERPL-CCNC: 19 U/L (ref 1–40)
BACTERIA UR QL AUTO: ABNORMAL /HPF
BASOPHILS # BLD AUTO: 0.06 10*3/MM3 (ref 0–0.2)
BASOPHILS NFR BLD AUTO: 0.5 % (ref 0–1.5)
BILIRUB SERPL-MCNC: 0.3 MG/DL (ref 0–1.2)
BILIRUB UR QL STRIP: NEGATIVE
BUN SERPL-MCNC: 33 MG/DL (ref 8–23)
BUN/CREAT SERPL: 15.9 (ref 7–25)
CALCIUM SPEC-SCNC: 9.2 MG/DL (ref 8.6–10.5)
CHLORIDE SERPL-SCNC: 106 MMOL/L (ref 98–107)
CLARITY UR: CLEAR
CO2 SERPL-SCNC: 23 MMOL/L (ref 22–29)
COLOR UR: YELLOW
CREAT SERPL-MCNC: 2.08 MG/DL (ref 0.76–1.27)
CREAT UR-MCNC: 284.4 MG/DL
DEPRECATED RDW RBC AUTO: 39.4 FL (ref 37–54)
EGFRCR SERPLBLD CKD-EPI 2021: 34.5 ML/MIN/1.73
EOSINOPHIL # BLD AUTO: 0.3 10*3/MM3 (ref 0–0.4)
EOSINOPHIL NFR BLD AUTO: 2.4 % (ref 0.3–6.2)
ERYTHROCYTE [DISTWIDTH] IN BLOOD BY AUTOMATED COUNT: 11.5 % (ref 12.3–15.4)
GLOBULIN UR ELPH-MCNC: 1.9 GM/DL
GLUCOSE SERPL-MCNC: 91 MG/DL (ref 65–99)
GLUCOSE UR STRIP-MCNC: NEGATIVE MG/DL
HCT VFR BLD AUTO: 33.7 % (ref 37.5–51)
HGB BLD-MCNC: 11.3 G/DL (ref 13–17.7)
HGB UR QL STRIP.AUTO: NEGATIVE
HYALINE CASTS UR QL AUTO: ABNORMAL /LPF
IMM GRANULOCYTES # BLD AUTO: 0.05 10*3/MM3 (ref 0–0.05)
IMM GRANULOCYTES NFR BLD AUTO: 0.4 % (ref 0–0.5)
KETONES UR QL STRIP: ABNORMAL
LEUKOCYTE ESTERASE UR QL STRIP.AUTO: NEGATIVE
LYMPHOCYTES # BLD AUTO: 2.31 10*3/MM3 (ref 0.7–3.1)
LYMPHOCYTES NFR BLD AUTO: 18.8 % (ref 19.6–45.3)
MCH RBC QN AUTO: 31.9 PG (ref 26.6–33)
MCHC RBC AUTO-ENTMCNC: 33.5 G/DL (ref 31.5–35.7)
MCV RBC AUTO: 95.2 FL (ref 79–97)
MONOCYTES # BLD AUTO: 0.87 10*3/MM3 (ref 0.1–0.9)
MONOCYTES NFR BLD AUTO: 7.1 % (ref 5–12)
NEUTROPHILS NFR BLD AUTO: 70.8 % (ref 42.7–76)
NEUTROPHILS NFR BLD AUTO: 8.7 10*3/MM3 (ref 1.7–7)
NITRITE UR QL STRIP: NEGATIVE
NRBC BLD AUTO-RTO: 0 /100 WBC (ref 0–0.2)
PH UR STRIP.AUTO: 6 [PH] (ref 5–8)
PLATELET # BLD AUTO: 202 10*3/MM3 (ref 140–450)
PMV BLD AUTO: 12.1 FL (ref 6–12)
POTASSIUM SERPL-SCNC: 3.9 MMOL/L (ref 3.5–5.2)
PROT ?TM UR-MCNC: 20.3 MG/DL
PROT SERPL-MCNC: 6.4 G/DL (ref 6–8.5)
PROT UR QL STRIP: ABNORMAL
PROT/CREAT UR: 0.07 MG/G{CREAT}
RBC # BLD AUTO: 3.54 10*6/MM3 (ref 4.14–5.8)
RBC # UR STRIP: ABNORMAL /HPF
REF LAB TEST METHOD: ABNORMAL
SODIUM SERPL-SCNC: 140 MMOL/L (ref 136–145)
SP GR UR STRIP: 1.02 (ref 1–1.03)
SPERM URNS QL MICRO: ABNORMAL /HPF
SQUAMOUS #/AREA URNS HPF: ABNORMAL /HPF
UROBILINOGEN UR QL STRIP: ABNORMAL
WBC # UR STRIP: ABNORMAL /HPF
WBC NRBC COR # BLD AUTO: 12.29 10*3/MM3 (ref 3.4–10.8)

## 2024-02-21 PROCEDURE — 82043 UR ALBUMIN QUANTITATIVE: CPT

## 2024-02-21 PROCEDURE — 36415 COLL VENOUS BLD VENIPUNCTURE: CPT

## 2024-02-21 PROCEDURE — 85025 COMPLETE CBC W/AUTO DIFF WBC: CPT

## 2024-02-21 PROCEDURE — 82570 ASSAY OF URINE CREATININE: CPT

## 2024-02-21 PROCEDURE — 81001 URINALYSIS AUTO W/SCOPE: CPT

## 2024-02-21 PROCEDURE — 80053 COMPREHEN METABOLIC PANEL: CPT

## 2024-02-21 PROCEDURE — 84156 ASSAY OF PROTEIN URINE: CPT

## 2024-05-25 ENCOUNTER — APPOINTMENT (OUTPATIENT)
Dept: GENERAL RADIOLOGY | Facility: HOSPITAL | Age: 69
End: 2024-05-25
Payer: MEDICARE

## 2024-05-25 ENCOUNTER — HOSPITAL ENCOUNTER (INPATIENT)
Facility: HOSPITAL | Age: 69
LOS: 2 days | Discharge: HOME | End: 2024-05-27
Attending: EMERGENCY MEDICINE | Admitting: STUDENT IN AN ORGANIZED HEALTH CARE EDUCATION/TRAINING PROGRAM
Payer: MEDICARE

## 2024-05-25 DIAGNOSIS — I95.9 HYPOTENSION, UNSPECIFIED HYPOTENSION TYPE: ICD-10-CM

## 2024-05-25 DIAGNOSIS — N18.9 ACUTE RENAL FAILURE SUPERIMPOSED ON CHRONIC KIDNEY DISEASE, UNSPECIFIED ACUTE RENAL FAILURE TYPE, UNSPECIFIED CKD STAGE: Primary | ICD-10-CM

## 2024-05-25 DIAGNOSIS — N17.9 AKI (ACUTE KIDNEY INJURY): ICD-10-CM

## 2024-05-25 DIAGNOSIS — R55 SYNCOPE AND COLLAPSE: ICD-10-CM

## 2024-05-25 DIAGNOSIS — N18.32 CKD STAGE 3B, GFR 30-44 ML/MIN: ICD-10-CM

## 2024-05-25 DIAGNOSIS — N17.9 ACUTE RENAL FAILURE SUPERIMPOSED ON CHRONIC KIDNEY DISEASE, UNSPECIFIED ACUTE RENAL FAILURE TYPE, UNSPECIFIED CKD STAGE: Primary | ICD-10-CM

## 2024-05-25 LAB
ALBUMIN SERPL-MCNC: 3.6 G/DL (ref 3.5–5.2)
ALBUMIN/GLOB SERPL: 1.2 G/DL
ALP SERPL-CCNC: 115 U/L (ref 39–117)
ALT SERPL W P-5'-P-CCNC: 14 U/L (ref 1–41)
ANION GAP SERPL CALCULATED.3IONS-SCNC: 13.5 MMOL/L (ref 5–15)
APAP SERPL-MCNC: <5 MCG/ML (ref 0–30)
AST SERPL-CCNC: 11 U/L (ref 1–40)
BACTERIA UR QL AUTO: NORMAL /HPF
BASE EXCESS BLDV CALC-SCNC: -15.3 MMOL/L (ref -2–2)
BASOPHILS # BLD AUTO: 0.01 10*3/MM3 (ref 0–0.2)
BASOPHILS NFR BLD AUTO: 0.1 % (ref 0–1.5)
BDY SITE: ABNORMAL
BILIRUB SERPL-MCNC: 0.3 MG/DL (ref 0–1.2)
BILIRUB UR QL STRIP: NEGATIVE
BUN SERPL-MCNC: 80 MG/DL (ref 8–23)
BUN/CREAT SERPL: 19.4 (ref 7–25)
CA-I BLDA-SCNC: 1.27 MMOL/L (ref 1.13–1.32)
CALCIUM SPEC-SCNC: 9.1 MG/DL (ref 8.6–10.5)
CHLORIDE BLDV-SCNC: 112 MMOL/L (ref 98–106)
CHLORIDE SERPL-SCNC: 112 MMOL/L (ref 98–107)
CLARITY UR: CLEAR
CO2 SERPL-SCNC: 11.5 MMOL/L (ref 22–29)
COHGB MFR BLD: 0.7 % (ref 0–1.5)
COLOR UR: YELLOW
CREAT SERPL-MCNC: 4.13 MG/DL (ref 0.76–1.27)
DEPRECATED RDW RBC AUTO: 42.7 FL (ref 37–54)
EGFRCR SERPLBLD CKD-EPI 2021: 15.1 ML/MIN/1.73
EOSINOPHIL # BLD AUTO: 0.06 10*3/MM3 (ref 0–0.4)
EOSINOPHIL NFR BLD AUTO: 0.8 % (ref 0.3–6.2)
ERYTHROCYTE [DISTWIDTH] IN BLOOD BY AUTOMATED COUNT: 12.3 % (ref 12.3–15.4)
FHHB: 38.9 % (ref 0–5)
GAS FLOW AIRWAY: ABNORMAL L/MIN
GLOBULIN UR ELPH-MCNC: 3 GM/DL
GLUCOSE BLDA-MCNC: 103 MG/DL (ref 70–99)
GLUCOSE SERPL-MCNC: 149 MG/DL (ref 65–99)
GLUCOSE UR STRIP-MCNC: NEGATIVE MG/DL
HCO3 BLDV-SCNC: 11.5 MMOL/L (ref 22–26)
HCT VFR BLD AUTO: 35.3 % (ref 37.5–51)
HGB BLD-MCNC: 11.4 G/DL (ref 13–17.7)
HGB BLDA-MCNC: 12.1 G/DL (ref 13.8–16.4)
HGB UR QL STRIP.AUTO: NEGATIVE
HOLD SPECIMEN: NORMAL
HOLD SPECIMEN: NORMAL
HYALINE CASTS UR QL AUTO: NORMAL /LPF
IMM GRANULOCYTES # BLD AUTO: 0.09 10*3/MM3 (ref 0–0.05)
IMM GRANULOCYTES NFR BLD AUTO: 1.1 % (ref 0–0.5)
INHALED O2 CONCENTRATION: ABNORMAL %
KETONES UR QL STRIP: ABNORMAL
LACTATE BLDA-SCNC: 1.26 MMOL/L (ref 0.5–2)
LEUKOCYTE ESTERASE UR QL STRIP.AUTO: NEGATIVE
LYMPHOCYTES # BLD AUTO: 0.45 10*3/MM3 (ref 0.7–3.1)
LYMPHOCYTES NFR BLD AUTO: 5.7 % (ref 19.6–45.3)
MAGNESIUM SERPL-MCNC: 1.9 MG/DL (ref 1.6–2.4)
MCH RBC QN AUTO: 30.6 PG (ref 26.6–33)
MCHC RBC AUTO-ENTMCNC: 32.3 G/DL (ref 31.5–35.7)
MCV RBC AUTO: 94.9 FL (ref 79–97)
METHGB BLD QL: 0.1 % (ref 0–1.5)
MODALITY: ABNORMAL
MONOCYTES # BLD AUTO: 0.26 10*3/MM3 (ref 0.1–0.9)
MONOCYTES NFR BLD AUTO: 3.3 % (ref 5–12)
NEUTROPHILS NFR BLD AUTO: 7.01 10*3/MM3 (ref 1.7–7)
NEUTROPHILS NFR BLD AUTO: 89 % (ref 42.7–76)
NITRITE UR QL STRIP: NEGATIVE
NOTE: ABNORMAL
NRBC BLD AUTO-RTO: 0 /100 WBC (ref 0–0.2)
OXYHGB MFR BLDV: 60.3 % (ref 94–99)
PCO2 BLDV: 30.3 MM HG (ref 41–51)
PH BLDV: 7.2 PH UNITS (ref 7.31–7.41)
PH UR STRIP.AUTO: <=5 [PH] (ref 5–8)
PLATELET # BLD AUTO: 202 10*3/MM3 (ref 140–450)
PMV BLD AUTO: 10.9 FL (ref 6–12)
PO2 BLDV: <40.5 MM HG (ref 35–42)
POTASSIUM BLDV-SCNC: 5 MMOL/L (ref 3.5–5)
POTASSIUM SERPL-SCNC: 5.4 MMOL/L (ref 3.5–5.2)
PROCALCITONIN SERPL-MCNC: 0.43 NG/ML (ref 0–0.25)
PROT SERPL-MCNC: 6.6 G/DL (ref 6–8.5)
PROT UR QL STRIP: ABNORMAL
RBC # BLD AUTO: 3.72 10*6/MM3 (ref 4.14–5.8)
RBC # UR STRIP: NORMAL /HPF
REF LAB TEST METHOD: NORMAL
SALICYLATES SERPL-MCNC: <0.3 MG/DL
SAO2 % BLDCOV: 60.8 % (ref 45–75)
SODIUM BLDV-SCNC: 136.6 MMOL/L (ref 136–146)
SODIUM SERPL-SCNC: 137 MMOL/L (ref 136–145)
SP GR UR STRIP: 1.02 (ref 1–1.03)
SQUAMOUS #/AREA URNS HPF: NORMAL /HPF
TROPONIN T SERPL HS-MCNC: 40 NG/L
UROBILINOGEN UR QL STRIP: ABNORMAL
WBC # UR STRIP: NORMAL /HPF
WBC NRBC COR # BLD AUTO: 7.88 10*3/MM3 (ref 3.4–10.8)
WHOLE BLOOD HOLD COAG: NORMAL
WHOLE BLOOD HOLD SPECIMEN: NORMAL

## 2024-05-25 PROCEDURE — 99291 CRITICAL CARE FIRST HOUR: CPT

## 2024-05-25 PROCEDURE — 83735 ASSAY OF MAGNESIUM: CPT

## 2024-05-25 PROCEDURE — 80179 DRUG ASSAY SALICYLATE: CPT | Performed by: STUDENT IN AN ORGANIZED HEALTH CARE EDUCATION/TRAINING PROGRAM

## 2024-05-25 PROCEDURE — 84484 ASSAY OF TROPONIN QUANT: CPT

## 2024-05-25 PROCEDURE — 25810000003 SODIUM CHLORIDE 0.9 % SOLUTION: Performed by: EMERGENCY MEDICINE

## 2024-05-25 PROCEDURE — 80053 COMPREHEN METABOLIC PANEL: CPT

## 2024-05-25 PROCEDURE — 84443 ASSAY THYROID STIM HORMONE: CPT | Performed by: STUDENT IN AN ORGANIZED HEALTH CARE EDUCATION/TRAINING PROGRAM

## 2024-05-25 PROCEDURE — 99223 1ST HOSP IP/OBS HIGH 75: CPT | Performed by: STUDENT IN AN ORGANIZED HEALTH CARE EDUCATION/TRAINING PROGRAM

## 2024-05-25 PROCEDURE — 71045 X-RAY EXAM CHEST 1 VIEW: CPT

## 2024-05-25 PROCEDURE — 81001 URINALYSIS AUTO W/SCOPE: CPT | Performed by: EMERGENCY MEDICINE

## 2024-05-25 PROCEDURE — 93010 ELECTROCARDIOGRAM REPORT: CPT | Performed by: INTERNAL MEDICINE

## 2024-05-25 PROCEDURE — 84145 PROCALCITONIN (PCT): CPT | Performed by: EMERGENCY MEDICINE

## 2024-05-25 PROCEDURE — 85025 COMPLETE CBC W/AUTO DIFF WBC: CPT

## 2024-05-25 PROCEDURE — 51798 US URINE CAPACITY MEASURE: CPT

## 2024-05-25 PROCEDURE — 93005 ELECTROCARDIOGRAM TRACING: CPT

## 2024-05-25 PROCEDURE — 0 DEXTROSE 5 % SOLUTION: Performed by: EMERGENCY MEDICINE

## 2024-05-25 PROCEDURE — 82805 BLOOD GASES W/O2 SATURATION: CPT | Performed by: EMERGENCY MEDICINE

## 2024-05-25 PROCEDURE — 93005 ELECTROCARDIOGRAM TRACING: CPT | Performed by: EMERGENCY MEDICINE

## 2024-05-25 PROCEDURE — 82820 HEMOGLOBIN-OXYGEN AFFINITY: CPT | Performed by: EMERGENCY MEDICINE

## 2024-05-25 PROCEDURE — 80143 DRUG ASSAY ACETAMINOPHEN: CPT | Performed by: STUDENT IN AN ORGANIZED HEALTH CARE EDUCATION/TRAINING PROGRAM

## 2024-05-25 RX ORDER — ALBUTEROL SULFATE 90 UG/1
2 AEROSOL, METERED RESPIRATORY (INHALATION)
Status: DISCONTINUED | OUTPATIENT
Start: 2024-05-26 | End: 2024-05-27 | Stop reason: HOSPADM

## 2024-05-25 RX ORDER — ACETAMINOPHEN 500 MG
1000 TABLET ORAL EVERY 8 HOURS PRN
Status: DISCONTINUED | OUTPATIENT
Start: 2024-05-25 | End: 2024-05-27 | Stop reason: HOSPADM

## 2024-05-25 RX ORDER — SODIUM CHLORIDE 0.9 % (FLUSH) 0.9 %
10 SYRINGE (ML) INJECTION EVERY 12 HOURS SCHEDULED
Status: DISCONTINUED | OUTPATIENT
Start: 2024-05-26 | End: 2024-05-27 | Stop reason: HOSPADM

## 2024-05-25 RX ORDER — METHOCARBAMOL 750 MG/1
750 TABLET, FILM COATED ORAL 3 TIMES DAILY PRN
COMMUNITY
Start: 2024-05-20 | End: 2024-05-27 | Stop reason: HOSPADM

## 2024-05-25 RX ORDER — SODIUM CHLORIDE 9 MG/ML
40 INJECTION, SOLUTION INTRAVENOUS AS NEEDED
Status: DISCONTINUED | OUTPATIENT
Start: 2024-05-25 | End: 2024-05-27 | Stop reason: HOSPADM

## 2024-05-25 RX ORDER — SODIUM CHLORIDE 0.9 % (FLUSH) 0.9 %
10 SYRINGE (ML) INJECTION AS NEEDED
Status: DISCONTINUED | OUTPATIENT
Start: 2024-05-25 | End: 2024-05-27 | Stop reason: HOSPADM

## 2024-05-25 RX ORDER — DICLOFENAC SODIUM 75 MG/1
75 TABLET, DELAYED RELEASE ORAL 2 TIMES DAILY
COMMUNITY
Start: 2024-04-04 | End: 2024-05-27 | Stop reason: HOSPADM

## 2024-05-25 RX ORDER — BUDESONIDE AND FORMOTEROL FUMARATE DIHYDRATE 160; 4.5 UG/1; UG/1
2 AEROSOL RESPIRATORY (INHALATION)
Status: DISCONTINUED | OUTPATIENT
Start: 2024-05-26 | End: 2024-05-27 | Stop reason: HOSPADM

## 2024-05-25 RX ORDER — HEPARIN SODIUM 5000 [USP'U]/ML
5000 INJECTION, SOLUTION INTRAVENOUS; SUBCUTANEOUS EVERY 8 HOURS SCHEDULED
Status: DISCONTINUED | OUTPATIENT
Start: 2024-05-26 | End: 2024-05-27 | Stop reason: HOSPADM

## 2024-05-25 RX ORDER — ATORVASTATIN CALCIUM 40 MG/1
80 TABLET, FILM COATED ORAL NIGHTLY
Status: DISCONTINUED | OUTPATIENT
Start: 2024-05-26 | End: 2024-05-27 | Stop reason: HOSPADM

## 2024-05-25 RX ORDER — PREDNISONE 20 MG/1
20 TABLET ORAL TAKE AS DIRECTED
Status: ON HOLD | COMMUNITY
Start: 2024-05-20 | End: 2024-05-27

## 2024-05-25 RX ORDER — NITROGLYCERIN 0.4 MG/1
0.4 TABLET SUBLINGUAL
Status: DISCONTINUED | OUTPATIENT
Start: 2024-05-25 | End: 2024-05-27 | Stop reason: HOSPADM

## 2024-05-25 RX ORDER — PROMETHAZINE HYDROCHLORIDE 25 MG/1
TABLET ORAL
Status: ON HOLD | COMMUNITY
Start: 2024-05-02 | End: 2024-05-26

## 2024-05-25 RX ORDER — MECLIZINE HYDROCHLORIDE 25 MG/1
25 TABLET ORAL 3 TIMES DAILY PRN
Status: DISCONTINUED | OUTPATIENT
Start: 2024-05-25 | End: 2024-05-27 | Stop reason: HOSPADM

## 2024-05-25 RX ORDER — LISINOPRIL 20 MG/1
20 TABLET ORAL DAILY
COMMUNITY
End: 2024-05-27 | Stop reason: HOSPADM

## 2024-05-25 RX ORDER — CETIRIZINE HYDROCHLORIDE 10 MG/1
10 TABLET ORAL DAILY
Status: DISCONTINUED | OUTPATIENT
Start: 2024-05-26 | End: 2024-05-27 | Stop reason: HOSPADM

## 2024-05-25 RX ORDER — LEVOFLOXACIN 500 MG/1
500 TABLET, FILM COATED ORAL DAILY
COMMUNITY
Start: 2024-05-20 | End: 2024-05-27 | Stop reason: HOSPADM

## 2024-05-25 RX ORDER — PREDNISONE 20 MG/1
20 TABLET ORAL DAILY
Qty: 13 TABLET | Refills: 0 | Status: DISCONTINUED | OUTPATIENT
Start: 2024-05-26 | End: 2024-05-27

## 2024-05-25 RX ORDER — SODIUM BICARBONATE 650 MG/1
1300 TABLET ORAL ONCE
Status: COMPLETED | OUTPATIENT
Start: 2024-05-25 | End: 2024-05-25

## 2024-05-25 RX ADMIN — SODIUM CHLORIDE 1000 ML: 9 INJECTION, SOLUTION INTRAVENOUS at 20:39

## 2024-05-25 RX ADMIN — SODIUM BICARBONATE 650 MG TABLET 1300 MG: at 21:47

## 2024-05-25 RX ADMIN — SODIUM BICARBONATE 150 MEQ: 84 INJECTION, SOLUTION INTRAVENOUS at 22:33

## 2024-05-25 NOTE — ED TRIAGE NOTES
Pt comes to the ER tonight for being weak, dizzy and short of breath. Pt states that he has been doing this for about 3 weeks and has been to the doctor for it.

## 2024-05-26 ENCOUNTER — APPOINTMENT (OUTPATIENT)
Dept: CT IMAGING | Facility: HOSPITAL | Age: 69
End: 2024-05-26
Payer: MEDICARE

## 2024-05-26 ENCOUNTER — APPOINTMENT (OUTPATIENT)
Dept: ULTRASOUND IMAGING | Facility: HOSPITAL | Age: 69
End: 2024-05-26
Payer: MEDICARE

## 2024-05-26 LAB
ALBUMIN SERPL-MCNC: 3 G/DL (ref 3.5–5.2)
ALBUMIN/GLOB SERPL: 1.4 G/DL
ALP SERPL-CCNC: 104 U/L (ref 39–117)
ALT SERPL W P-5'-P-CCNC: 11 U/L (ref 1–41)
ANION GAP SERPL CALCULATED.3IONS-SCNC: 12.4 MMOL/L (ref 5–15)
AST SERPL-CCNC: 9 U/L (ref 1–40)
BILIRUB SERPL-MCNC: 0.2 MG/DL (ref 0–1.2)
BUN SERPL-MCNC: 64 MG/DL (ref 8–23)
BUN/CREAT SERPL: 18.6 (ref 7–25)
CALCIUM SPEC-SCNC: 8.5 MG/DL (ref 8.6–10.5)
CHLORIDE SERPL-SCNC: 111 MMOL/L (ref 98–107)
CO2 SERPL-SCNC: 14.6 MMOL/L (ref 22–29)
CREAT SERPL-MCNC: 3.45 MG/DL (ref 0.76–1.27)
D-LACTATE SERPL-SCNC: 0.7 MMOL/L (ref 0.5–2)
DEPRECATED RDW RBC AUTO: 41.8 FL (ref 37–54)
EGFRCR SERPLBLD CKD-EPI 2021: 18.8 ML/MIN/1.73
ERYTHROCYTE [DISTWIDTH] IN BLOOD BY AUTOMATED COUNT: 12.2 % (ref 12.3–15.4)
GEN 5 2HR TROPONIN T REFLEX: 21 NG/L
GLOBULIN UR ELPH-MCNC: 2.2 GM/DL
GLUCOSE SERPL-MCNC: 160 MG/DL (ref 65–99)
HCT VFR BLD AUTO: 30.5 % (ref 37.5–51)
HGB BLD-MCNC: 10.1 G/DL (ref 13–17.7)
MAGNESIUM SERPL-MCNC: 1.7 MG/DL (ref 1.6–2.4)
MCH RBC QN AUTO: 30.8 PG (ref 26.6–33)
MCHC RBC AUTO-ENTMCNC: 33.1 G/DL (ref 31.5–35.7)
MCV RBC AUTO: 93 FL (ref 79–97)
PHOSPHATE SERPL-MCNC: 3.8 MG/DL (ref 2.5–4.5)
PLATELET # BLD AUTO: 159 10*3/MM3 (ref 140–450)
PMV BLD AUTO: 10.8 FL (ref 6–12)
POTASSIUM SERPL-SCNC: 4.1 MMOL/L (ref 3.5–5.2)
PROT SERPL-MCNC: 5.2 G/DL (ref 6–8.5)
QT INTERVAL: 287 MS
QTC INTERVAL: 393 MS
RBC # BLD AUTO: 3.28 10*6/MM3 (ref 4.14–5.8)
SODIUM SERPL-SCNC: 138 MMOL/L (ref 136–145)
TROPONIN T DELTA: -1 NG/L
TROPONIN T SERPL HS-MCNC: 22 NG/L
TSH SERPL DL<=0.05 MIU/L-ACNC: 1.51 UIU/ML (ref 0.27–4.2)
WBC NRBC COR # BLD AUTO: 8.08 10*3/MM3 (ref 3.4–10.8)
WHOLE BLOOD HOLD SPECIMEN: NORMAL

## 2024-05-26 PROCEDURE — 70450 CT HEAD/BRAIN W/O DYE: CPT

## 2024-05-26 PROCEDURE — 80053 COMPREHEN METABOLIC PANEL: CPT | Performed by: STUDENT IN AN ORGANIZED HEALTH CARE EDUCATION/TRAINING PROGRAM

## 2024-05-26 PROCEDURE — 99232 SBSQ HOSP IP/OBS MODERATE 35: CPT | Performed by: STUDENT IN AN ORGANIZED HEALTH CARE EDUCATION/TRAINING PROGRAM

## 2024-05-26 PROCEDURE — 84100 ASSAY OF PHOSPHORUS: CPT | Performed by: STUDENT IN AN ORGANIZED HEALTH CARE EDUCATION/TRAINING PROGRAM

## 2024-05-26 PROCEDURE — 85027 COMPLETE CBC AUTOMATED: CPT | Performed by: STUDENT IN AN ORGANIZED HEALTH CARE EDUCATION/TRAINING PROGRAM

## 2024-05-26 PROCEDURE — 76775 US EXAM ABDO BACK WALL LIM: CPT

## 2024-05-26 PROCEDURE — 0 DEXTROSE 5 % SOLUTION: Performed by: STUDENT IN AN ORGANIZED HEALTH CARE EDUCATION/TRAINING PROGRAM

## 2024-05-26 PROCEDURE — 36415 COLL VENOUS BLD VENIPUNCTURE: CPT | Performed by: STUDENT IN AN ORGANIZED HEALTH CARE EDUCATION/TRAINING PROGRAM

## 2024-05-26 PROCEDURE — 84484 ASSAY OF TROPONIN QUANT: CPT | Performed by: STUDENT IN AN ORGANIZED HEALTH CARE EDUCATION/TRAINING PROGRAM

## 2024-05-26 PROCEDURE — 63710000001 PREDNISONE PER 1 MG: Performed by: STUDENT IN AN ORGANIZED HEALTH CARE EDUCATION/TRAINING PROGRAM

## 2024-05-26 PROCEDURE — 94640 AIRWAY INHALATION TREATMENT: CPT

## 2024-05-26 PROCEDURE — 83605 ASSAY OF LACTIC ACID: CPT | Performed by: STUDENT IN AN ORGANIZED HEALTH CARE EDUCATION/TRAINING PROGRAM

## 2024-05-26 PROCEDURE — 83735 ASSAY OF MAGNESIUM: CPT | Performed by: STUDENT IN AN ORGANIZED HEALTH CARE EDUCATION/TRAINING PROGRAM

## 2024-05-26 PROCEDURE — 25010000002 HEPARIN (PORCINE) PER 1000 UNITS: Performed by: STUDENT IN AN ORGANIZED HEALTH CARE EDUCATION/TRAINING PROGRAM

## 2024-05-26 PROCEDURE — 94799 UNLISTED PULMONARY SVC/PX: CPT

## 2024-05-26 RX ADMIN — ALBUTEROL SULFATE 2 PUFF: 90 AEROSOL, METERED RESPIRATORY (INHALATION) at 00:53

## 2024-05-26 RX ADMIN — ATORVASTATIN CALCIUM 80 MG: 40 TABLET, FILM COATED ORAL at 21:13

## 2024-05-26 RX ADMIN — SODIUM BICARBONATE 150 MEQ: 84 INJECTION, SOLUTION INTRAVENOUS at 05:31

## 2024-05-26 RX ADMIN — HEPARIN SODIUM 5000 UNITS: 5000 INJECTION INTRAVENOUS; SUBCUTANEOUS at 21:13

## 2024-05-26 RX ADMIN — HEPARIN SODIUM 5000 UNITS: 5000 INJECTION INTRAVENOUS; SUBCUTANEOUS at 05:30

## 2024-05-26 RX ADMIN — SODIUM BICARBONATE 150 MEQ: 84 INJECTION, SOLUTION INTRAVENOUS at 12:22

## 2024-05-26 RX ADMIN — BUDESONIDE AND FORMOTEROL FUMARATE DIHYDRATE 2 PUFF: 160; 4.5 AEROSOL RESPIRATORY (INHALATION) at 00:53

## 2024-05-26 RX ADMIN — HEPARIN SODIUM 5000 UNITS: 5000 INJECTION INTRAVENOUS; SUBCUTANEOUS at 15:37

## 2024-05-26 RX ADMIN — Medication 10 ML: at 21:14

## 2024-05-26 RX ADMIN — Medication 10 ML: at 00:16

## 2024-05-26 RX ADMIN — ALBUTEROL SULFATE 2 PUFF: 90 AEROSOL, METERED RESPIRATORY (INHALATION) at 18:07

## 2024-05-26 RX ADMIN — PREDNISONE 20 MG: 20 TABLET ORAL at 09:23

## 2024-05-26 RX ADMIN — BUDESONIDE AND FORMOTEROL FUMARATE DIHYDRATE 2 PUFF: 160; 4.5 AEROSOL RESPIRATORY (INHALATION) at 06:28

## 2024-05-26 RX ADMIN — ALBUTEROL SULFATE 2 PUFF: 90 AEROSOL, METERED RESPIRATORY (INHALATION) at 14:10

## 2024-05-26 RX ADMIN — ALBUTEROL SULFATE 2 PUFF: 90 AEROSOL, METERED RESPIRATORY (INHALATION) at 06:28

## 2024-05-26 RX ADMIN — HEPARIN SODIUM 5000 UNITS: 5000 INJECTION INTRAVENOUS; SUBCUTANEOUS at 00:16

## 2024-05-26 RX ADMIN — CETIRIZINE HYDROCHLORIDE 10 MG: 10 TABLET, FILM COATED ORAL at 09:23

## 2024-05-26 RX ADMIN — BUDESONIDE AND FORMOTEROL FUMARATE DIHYDRATE 2 PUFF: 160; 4.5 AEROSOL RESPIRATORY (INHALATION) at 18:06

## 2024-05-26 RX ADMIN — ATORVASTATIN CALCIUM 80 MG: 40 TABLET, FILM COATED ORAL at 00:16

## 2024-05-26 RX ADMIN — Medication 10 ML: at 09:23

## 2024-05-26 NOTE — PROGRESS NOTES
[facility]   Hospitalist Progress Note  Date: 2024  Patient Name: Jonathan Guerra  : 1955  MRN: [id number]  Date of admission: 2024  Room/Bed: 3002/      Subjective   Subjective     Chief Complaint: Generalized weakness and dizzy     Summary:Jonathan Guerra is a 68 y.o. male with past medical history significant for COPD, HTN, HLD, and GERD who presented for generalized weakness and dizzy for 3 weeks.  He also had a syncopal episode about 2 weeks ago when he was walking to the kitchen which lasted for few seconds, he fell and hit his left elbow, denied hitting his head.  Mention he was started on some new medication by his PCP but could not recall the names.  Blood pressure low on presentation with documented blood pressure of 80/48.  Blood work showed DARIEL with creatinine 4.13 (baseline 2-2.2).  Also found to have hyperkalemia 5.4.  Troponin was 40 on presentation which down trended to 22.  Elevated procalcitonin of 0.43.  UA was negative for any UTI.Chest x-ray showed no acute cardiopulmonary findings.  Patient was admitted for further evaluation and management.  Nephrology was consulted.    Interval Followup: No acute events overnight.  Patient's blood pressure has improved but on low normal level despite him not being on any antihypertensive medication he is at home.  Stated he feels better with no dizziness/lightheadedness.  Weakness better.  Denies any fever, chills, rigors, chest pain or difficulty breathing.    Review of Systems    All systems reviewed and negative except for what is outlined above.      Objective   Objective     Vitals:   Temp:  [97.2 °F (36.2 °C)-97.7 °F (36.5 °C)] 97.7 °F (36.5 °C)  Heart Rate:  [] 85  Resp:  [18-24] 18  BP: ()/(43-84) 118/53    Physical Exam   General: Awake, alert, NAD  Cardiovascular: RRR, no murmurs   Pulmonary: CTA bilaterally; no wheezes; no conversational dyspnea  Gastrointestinal: S/ND/NT, +BS  Neuro: Alert, awake, oriented x  3; speech clear; no tremor      Result Review    Result Review:  I have personally reviewed these results:  [x]  Laboratory      Lab 05/26/24  0136 05/26/24  0021 05/25/24 2112 05/25/24 1943   WBC 8.08  --   --  7.88   HEMOGLOBIN 10.1*  --   --  11.4*   HEMATOCRIT 30.5*  --   --  35.3*   PLATELETS 159  --   --  202   NEUTROS ABS  --   --   --  7.01*   IMMATURE GRANS (ABS)  --   --   --  0.09*   LYMPHS ABS  --   --   --  0.45*   MONOS ABS  --   --   --  0.26   EOS ABS  --   --   --  0.06   MCV 93.0  --   --  94.9   PROCALCITONIN  --   --   --  0.43*   LACTATE  --  0.7  --   --    LACTATE, ARTERIAL  --   --  1.26  --          Lab 05/26/24 0136 05/25/24 2112 05/25/24 1943   SODIUM 138  --  137   SODIUM, VENOUS  --  136.6  --    POTASSIUM 4.1  --  5.4*   POTASSIUM, VENOUS  --  5.0  --    CHLORIDE 111*  --  112*   CHLORIDE, VENOUS  --  112*  --    CO2 14.6*  --  11.5*   ANION GAP 12.4  --  13.5   BUN 64*  --  80*   CREATININE 3.45*  --  4.13*   EGFR 18.8*  --  15.1*   GLUCOSE 160*  --  149*   GLUCOSE, ARTERIAL  --  103*  --    CALCIUM 8.5*  --  9.1   IONIZED CALCIUM  --  1.27  --    MAGNESIUM 1.7  --  1.9   PHOSPHORUS 3.8  --   --    TSH  --   --  1.510         Lab 05/26/24 0136 05/25/24 1943   TOTAL PROTEIN 5.2* 6.6   ALBUMIN 3.0* 3.6   GLOBULIN 2.2 3.0   ALT (SGPT) 11 14   AST (SGOT) 9 11   BILIRUBIN 0.2 0.3   ALK PHOS 104 115         Lab 05/26/24  0438 05/26/24 0136 05/25/24 1943   HSTROP T 21 22* 40*                 Lab 05/25/24 2112   CARBOXYHEMOGLOBIN 0.7     Brief Urine Lab Results  (Last result in the past 365 days)        Color   Clarity   Blood   Leuk Est   Nitrite   Protein   CREAT   Urine HCG        05/25/24 2109 Yellow   Clear   Negative   Negative   Negative   30 mg/dL (1+)                 [x]  Microbiology   Microbiology Results (last 10 days)       ** No results found for the last 240 hours. **          [x]  Radiology  XR Chest 1 View    Result Date: 5/25/2024  No acute cardiopulmonary findings.   Electronically Signed By-Dr. Arjun Andrade MD On:5/25/2024 7:43 PM     []  EKG/Telemetry   []  Cardiology/Vascular   []  Pathology  []  Old records  []  Other:    Assessment & Plan   Assessment / Plan     Assessment:  Syncopal episode  Lightheadedness/generalized weakness could be due to orthostatic hypotension from polypharmacy  Hypotension, improving  DARIEL on CKD stage III, baseline creatinine 2-2.2  Elevated troponin, likely type II MI  Elevated procalcitonin, no source or signs/symptoms of infection  Metabolic acidosis  History of hypertension  History of COPD  History of hyperlipidemia  GERD  Plan:  Currently being managed in medicine service.  Presented with 1 episode of syncope along with generalized weakness and lightheadedness.  Blood pressure was low on presentation, better today.  Patient is on lisinopril and hydrochlorothiazide at home which she is compliant with.  Symptoms could be associated with attention due to polypharmacy.  Holding home dose of lisinopril and hydrochlorothiazide.  Will discontinue at the time of discharge.  Patient's symptoms better today.  DARIEL on CKD, improving.  Creatinine 2.45 today.  On bicarb drip for metabolic acidosis.  Bicarb improving with level 14.6 today.  Continue.  Nephrology following the patient, appreciate input.  Holding nephrotoxic medications.  Renal ultrasound bilateral showed unremarkable findings.  CT head showed no acute intracranial abnormality but did show white matter changes compatible with small vessel ischemia associated with disease group.  Continue rest of the current management.  Obtain morning cortisol.  Patient on steroids for about 3 weeks which is prescribed by his PCP as outpatient, will taper steroid.  Likely discharge in next 24-48 hours depending upon his clinical course.  Discussed with RN.    DVT prophylaxis:  Medical DVT prophylaxis orders are present.        CODE STATUS:   Level Of Support Discussed With: Patient  Code Status (Patient  has no pulse and is not breathing): CPR (Attempt to Resuscitate)  Medical Interventions (Patient has pulse or is breathing): Full Support      Electronically signed by Caleb Contreras MD, 05/26/24, 9:17 AM EDT.

## 2024-05-26 NOTE — ED PROVIDER NOTES
Time: 8:46 PM EDT  Date of encounter:  5/25/2024  Independent Historian/Clinical History and Information was obtained by:   Patient    History is limited by: N/A    Chief Complaint: Syncope, lightheadedness and dizziness, weakness x 3 weeks        History of Present Illness:  Patient is a 68 y.o. year old male who presents to the emergency department for evaluation of syncopal episode and weakness and lightheadedness and dizziness for the past 3 weeks.    He has been recently started on some new medications.    He denies any vomiting or diarrhea.    He states he is not urinating as much is normal.    No chest pain.    He does feel somewhat shaky and short of breath at times.  No recent fevers or infection.    HPI    Patient Care Team  Primary Care Provider: Provider, No Known    Past Medical History:     Allergies   Allergen Reactions    Azithromycin Unknown - High Severity    Codeine Unknown - High Severity    Penicillins Unknown - High Severity     Past Medical History:   Diagnosis Date    Acid reflux     Acid reflux disease     Anemia, unspecified     Anxiety     Arthritis     Asthma     Broken bones     Calcaneus fracture, left     Chronic bronchitis     COPD (chronic obstructive pulmonary disease)     Depression     Diverticulitis     Emphysema lung     Head injury     skull fracture     Hernia cerebri     HTN (hypertension)     Hyperlipemia     Lung disease      Past Surgical History:   Procedure Laterality Date    COLONOSCOPY  2014, 2019    ENDOSCOPY       Family History   Problem Relation Age of Onset    Other Mother         Thyroid Gland Neoplasm    Heart disease Mother     Colon cancer Mother     Diabetes Sister     Colon cancer Paternal Grandmother        Home Medications:  Prior to Admission medications    Medication Sig Start Date End Date Taking? Authorizing Provider   diclofenac (VOLTAREN) 75 MG EC tablet  4/4/24  Yes Provider, MD Dillon   levoFLOXacin (LEVAQUIN) 500 MG tablet  5/20/24  Yes  Dillon Mart MD   methocarbamol (ROBAXIN) 750 MG tablet  24  Yes Dillon Mart MD   predniSONE (DELTASONE) 20 MG tablet  24  Yes Dillon Mart MD   promethazine (PHENERGAN) 25 MG tablet  24  Yes Dillon Mart MD   albuterol sulfate  (90 Base) MCG/ACT inhaler Inhale 2 puffs 4 (Four) Times a Day. 10/5/23   Casey Mosley MD   atorvastatin (LIPITOR) 80 MG tablet Take 1 tablet by mouth Daily. 23   Casey Mosley MD   Budeson-Glycopyrrol-Formoterol (Breztri Aerosphere) 160-9-4.8 MCG/ACT aerosol inhaler Inhale 2 puffs 2 (Two) Times a Day. 23   Casey Mosley MD   cetirizine (zyrTEC) 10 MG tablet TAKE 1 TABLET BY MOUTH DAILY. 24   Casey Mosley MD   famotidine (Pepcid) 40 MG tablet Take 1 tablet by mouth Daily. 23   Casey Mosley MD   ipratropium-albuterol (Combivent Respimat)  MCG/ACT inhaler 1 puff Every 6 (Six) Hours.    Dillon Mart MD   ketotifen (ZADITOR) 0.025 % ophthalmic solution  22   Dillno Mart MD   lisinopril (PRINIVIL,ZESTRIL) 10 MG tablet 1 (one) time each day at the same time    Dillon Mart MD   lisinopril-hydrochlorothiazide (PRINZIDE,ZESTORETIC) 20-12.5 MG per tablet Take 1 tablet by mouth Daily. 23   Casey Mosley MD   meclizine (ANTIVERT) 25 MG tablet TAKE 1 TABLET BY MOUTH 3 (THREE) TIMES A DAY AS NEEDED FOR DIZZINESS. 1/3/23   Casey Mosley MD   metoprolol succinate XL (Toprol XL) 25 MG 24 hr tablet Take 1 tablet by mouth 2 (two) times a day. 21   Jin Green MD   omeprazole (priLOSEC) 20 MG capsule TAKE 1 CAPSULE BY MOUTH DAILY. 23   Casey Mosley MD        Social History:   Social History     Tobacco Use    Smoking status: Former     Current packs/day: 0.00     Average packs/day: 1.5 packs/day for 40.0 years (60.0 ttl pk-yrs)     Types: Cigarettes     Start date:      Quit date:      Years since quittin.4     Passive exposure: Past    Smokeless tobacco: Never  "  Vaping Use    Vaping status: Never Used   Substance Use Topics    Alcohol use: Yes     Comment: Ocassional     Drug use: Never         Review of Systems:  Review of Systems   I performed a 10 point review of systems which was all negative, except for the positives found in the HPI above.  Physical Exam:  /53 (BP Location: Right arm, Patient Position: Lying)   Pulse 93   Temp 97.3 °F (36.3 °C) (Oral)   Resp 18   Ht 165.1 cm (65\")   Wt 67.8 kg (149 lb 7.6 oz)   SpO2 99%   BMI 24.87 kg/m²     Physical Exam   General: Awake alert and in no obvious distress    HEENT: Head normocephalic atraumatic, eyes PERRLA EOMI, nose normal, oropharynx normal.    Neck: Supple full range of motion, no meningismus, no lymphadenopathy    Heart: Tachycardic with a regular rhythm, no murmurs or rubs, 2+ radial pulses bilaterally    Lungs: Clear to auscultation bilaterally without wheezes or crackles, no respiratory distress    Abdomen: Soft, nontender, nondistended, no rebound or guarding    Skin: Warm, dry, no rash    Musculoskeletal: Normal range of motion, no lower extremity edema or calf tenderness    Neurologic: Oriented x3, no motor deficits no sensory deficits    Psychiatric: Mood appears stable, no psychosis          Procedures:  Procedures      Medical Decision Making:      Comorbidities that affect care:    Chronic Kidney Disease    External Notes reviewed:    Previous Labs: I compared today's lab work and renal function to his baseline labs and looks like he is acute on chronic renal failure and signs of metabolic acidosis.      The following orders were placed and all results were independently analyzed by me:  Orders Placed This Encounter   Procedures    XR Chest 1 View    US Renal Bilateral    Pierce Draw    Comprehensive Metabolic Panel    Single High Sensitivity Troponin T    Magnesium    Urinalysis With Microscopic If Indicated (No Culture) - Urine, Clean Catch    CBC Auto Differential    Procalcitonin    " VBG with Co-Ox and Electrolytes    Salicylate Level    Acetaminophen Level    Lactic Acid, Plasma    Urinalysis, Microscopic Only - Urine, Clean Catch    NPO Diet NPO Type: Strict NPO    Undress & Gown    Continuous Pulse Oximetry    Vital Signs    Orthostatic Blood Pressure    Bladder scan    Code Status and Medical Interventions:    Hospitalist (on-call MD unless specified)    Nephrology  (on-call MD unless specified)    Oxygen Therapy- Nasal Cannula; Titrate 1-6 LPM Per SpO2; 90 - 95%    POC Glucose Once    ECG 12 Lead ED Triage Standing Order; Weak / Dizzy / AMS    Insert Peripheral IV    Insert Peripheral IV    Inpatient Admission    Fall Precautions    CBC & Differential    Green Top (Gel)    Lavender Top    Gold Top - SST    Light Blue Top       Medications Given in the Emergency Department:  Medications   sodium chloride 0.9 % flush 10 mL (has no administration in time range)   sodium chloride 0.9 % flush 10 mL (has no administration in time range)   sodium zirconium cyclosilicate (LOKELMA) packet 10 g (has no administration in time range)   sodium bicarbonate 150 mEq/1000 mL D5W infusion (150 mEq Intravenous New Bag 5/25/24 2233)   sodium chloride 0.9 % bolus 1,000 mL (0 mL Intravenous Stopped 5/25/24 2230)   sodium bicarbonate tablet 1,300 mg (1,300 mg Oral Given 5/25/24 2147)        ED Course:    ED Course as of 05/25/24 2247   Sat May 25, 2024   2046 EKG: I interpreted his twelve-lead EKG is sinus tachycardia 113 bpm, normal P waves, normal QRS, normal ST segments and T waves; no acute ischemia or ectopy.  Normal intervals. [VS]      ED Course User Index  [VS] Garrett Hill MD       Labs:    Lab Results (last 24 hours)       Procedure Component Value Units Date/Time    CBC & Differential [[id number]]  (Abnormal) Collected: 05/25/24 1943    Specimen: Blood Updated: 05/25/24 1951    Narrative:      The following orders were created for panel order CBC & Differential.  Procedure                                Abnormality         Status                     ---------                               -----------         ------                     CBC Auto Differential[[id number]]        Abnormal            Final result                 Please view results for these tests on the individual orders.    Comprehensive Metabolic Panel [[id number]]  (Abnormal) Collected: 05/25/24 1943    Specimen: Blood Updated: 05/25/24 2008     Glucose 149 mg/dL      BUN 80 mg/dL      Creatinine 4.13 mg/dL      Sodium 137 mmol/L      Potassium 5.4 mmol/L      Chloride 112 mmol/L      CO2 11.5 mmol/L      Calcium 9.1 mg/dL      Total Protein 6.6 g/dL      Albumin 3.6 g/dL      ALT (SGPT) 14 U/L      AST (SGOT) 11 U/L      Alkaline Phosphatase 115 U/L      Total Bilirubin 0.3 mg/dL      Globulin 3.0 gm/dL      A/G Ratio 1.2 g/dL      BUN/Creatinine Ratio 19.4     Anion Gap 13.5 mmol/L      eGFR 15.1 mL/min/1.73     Narrative:      GFR Normal >60  Chronic Kidney Disease <60  Kidney Failure <15      Single High Sensitivity Troponin T [[id number]]  (Abnormal) Collected: 05/25/24 1943    Specimen: Blood Updated: 05/25/24 2007     HS Troponin T 40 ng/L     Narrative:      High Sensitive Troponin T Reference Range:  <14.0 ng/L- Negative Female for AMI  <22.0 ng/L- Negative Male for AMI  >=14 - Abnormal Female indicating possible myocardial injury.  >=22 - Abnormal Male indicating possible myocardial injury.   Clinicians would have to utilize clinical acumen, EKG, Troponin, and serial changes to determine if it is an Acute Myocardial Infarction or myocardial injury due to an underlying chronic condition.         Magnesium [[id number]]  (Normal) Collected: 05/25/24 1943    Specimen: Blood Updated: 05/25/24 2008     Magnesium 1.9 mg/dL     CBC Auto Differential [[id number]]  (Abnormal) Collected: 05/25/24 1943    Specimen: Blood Updated: 05/25/24 1951     WBC 7.88 10*3/mm3      RBC 3.72 10*6/mm3      Hemoglobin 11.4 g/dL      Hematocrit 35.3 %      MCV 94.9 fL   "    MCH 30.6 pg      MCHC 32.3 g/dL      RDW 12.3 %      RDW-SD 42.7 fl      MPV 10.9 fL      Platelets 202 10*3/mm3      Neutrophil % 89.0 %      Lymphocyte % 5.7 %      Monocyte % 3.3 %      Eosinophil % 0.8 %      Basophil % 0.1 %      Immature Grans % 1.1 %      Neutrophils, Absolute 7.01 10*3/mm3      Lymphocytes, Absolute 0.45 10*3/mm3      Monocytes, Absolute 0.26 10*3/mm3      Eosinophils, Absolute 0.06 10*3/mm3      Basophils, Absolute 0.01 10*3/mm3      Immature Grans, Absolute 0.09 10*3/mm3      nRBC 0.0 /100 WBC     Procalcitonin [[id number]]  (Abnormal) Collected: 05/25/24 1943    Specimen: Blood Updated: 05/25/24 2050     Procalcitonin 0.43 ng/mL     Narrative:      As a Marker for Sepsis (Non-Neonates):    1. <0.5 ng/mL represents a low risk of severe sepsis and/or septic shock.  2. >2 ng/mL represents a high risk of severe sepsis and/or septic shock.    As a Marker for Lower Respiratory Tract Infections that require antibiotic therapy:    PCT on Admission    Antibiotic Therapy       6-12 Hrs later    >0.5                Strongly Recommended  >0.25 - <0.5        Recommended  0.1 - 0.25          Discouraged              Remeasure/reassess PCT  <0.1                Strongly Discouraged     Remeasure/reassess PCT    As 28 day mortality risk marker: \"Change in Procalcitonin Result\" (>80% or <=80%) if Day 0 (or Day 1) and Day 4 values are available. Refer to http://[email or web address]-pct-calculator.com    Change in PCT <=80%  A decrease of PCT levels below or equal to 80% defines a positive change in PCT test result representing a higher risk for 28-day all-cause mortality of patients diagnosed with severe sepsis for septic shock.    Change in PCT >80%  A decrease of PCT levels of more than 80% defines a negative change in PCT result representing a lower risk for 28-day all-cause mortality of patients diagnosed with severe sepsis or septic shock.    This test is Prognostic not Diagnostic, if elevated correlate with " clinical findings before administering antibiotic treatment.        Salicylate Level [[id number]]  (Normal) Collected: 05/25/24 1943    Specimen: Blood Updated: 05/25/24 2118     Salicylate <0.3 mg/dL     Acetaminophen Level [[id number]]  (Normal) Collected: 05/25/24 1943    Specimen: Blood Updated: 05/25/24 2118     Acetaminophen <5.0 mcg/mL     Urinalysis With Microscopic If Indicated (No Culture) - Urine, Clean Catch [[id number]]  (Abnormal) Collected: 05/25/24 2109    Specimen: Urine, Clean Catch Updated: 05/25/24 2125     Color, UA Yellow     Appearance, UA Clear     pH, UA <=5.0     Specific Gravity, UA 1.021     Glucose, UA Negative     Ketones, UA Trace     Bilirubin, UA Negative     Blood, UA Negative     Protein, UA 30 mg/dL (1+)     Leuk Esterase, UA Negative     Nitrite, UA Negative     Urobilinogen, UA 0.2 E.U./dL    Urinalysis, Microscopic Only - Urine, Clean Catch [[id number]] Collected: 05/25/24 2109    Specimen: Urine, Clean Catch Updated: 05/25/24 2126     RBC, UA 0-2 /HPF      WBC, UA 0-2 /HPF      Bacteria, UA None Seen /HPF      Squamous Epithelial Cells, UA 0-2 /HPF      Hyaline Casts, UA 3-6 /LPF      Methodology Automated Microscopy    VBG with Co-Ox and Electrolytes [[id number]]  (Abnormal) Collected: 05/25/24 2112    Specimen: Venous Blood Updated: 05/25/24 2133     pH, Venous 7.196 pH Units      pCO2, Venous 30.3 mm Hg      pO2, Venous <40.5 mm Hg      HCO3, Venous 11.5 mmol/L      Base Excess, Venous -15.3 mmol/L      O2 Saturation, Venous 60.8 %      Hemoglobin, Blood Gas 12.1 g/dL      Carboxyhemoglobin 0.7 %      Methemoglobin 0.10 %      Oxyhemoglobin 60.3 %      FHHB 38.9 %      Note --     Site Drawn by RN     Modality Room Air     FIO2 --     Flow Rate --     Sodium, Venous 136.6 mmol/L      Potassium, Venous 5.0 mmol/L      Ionized Calcium, Arterial 1.27 mmol/L      Chloride, Venous  112 mmol/L      Glucose, Arterial 103 mg/dL      Lactate, Arterial 1.26 mmol/L               Imaging:    XR Chest 1 View    Result Date: 5/25/2024  AP PORTABLE CHEST  HISTORY: Weakness and dizziness.  COMPARISON: 9/29/2022  TECHNIQUE: AP portable chest x-ray.  FINDINGS: Cardiac and mediastinal contours are normal. Pulmonary vascularity is normal. The lungs are clear except for scarring in the apices. No pneumothorax is identified.      No acute cardiopulmonary findings.  Electronically Signed By-Dr. Arjun Andrade MD On:5/25/2024 7:43 PM         Differential Diagnosis and Discussion:    Syncope: Differential diagnosis includes but is not limited to TIA, hyperventilation, aortic stenosis, pulmonary emboli, myocardial disease, bradycardia arrhythmia, heart block, tachyarrhythmia, vasovagal, orthostatic hypotension, ruptured AAA, aortic dissection, subarachnoid hemorrhage, seizure, hypoglycemia.  Weakness: Based on the patient's history, signs, and symptoms, the diffential diagnosis includes but is not limited to meningitis, stroke, sepsis, subarachnoid hemorrhage, intracranial bleeding, encephalitis, acute uti, dehydration, MS, myasthenia gravis, Guillan [place], migraine variant, neuromuscular disorders vertigo, electrolyte imbalance, and metabolic disorders.    All labs were reviewed and interpreted by me.  All X-rays impressions were independently interpreted by me.  EKG was interpreted by me.    MDM     Amount and/or Complexity of Data Reviewed  Clinical lab tests: reviewed  Tests in the radiology section of CPT®: reviewed  Tests in the medicine section of CPT®: reviewed  Decide to obtain previous medical records or to obtain history from someone other than the patient: yes           This patient is a pleasant 68-year-old male presenting with lightheadedness dizziness syncope and a fall.    On arrival blood pressure is low and he is tachycardic here.    We started bolusing him IV fluids and checking lab work.    I do not see any signs of acute blood loss or infection today,  but he does have worsening  renal failure and low serum bicarbonate level.    I will contact his nephrologist and we will plan to admit him.      I am starting him on IV sodium bicarbonate drip for his acidosis as seen by pH of 7.19 on his blood gas, after consultation with nephrology.    Critical Care Note: Total Critical Care time of 35 minutes. Total critical care time documented does not include time spent on separately billed procedures for services of nurses or physician assistants. I personally saw and examined the patient. I have reviewed all diagnostic interpretations and treatment plans as written. I was present for the key portions of any procedures performed and the inclusive time noted in any critical care statement. Critical care time includes patient management by me, time spent at the patients bedside,  time to review lab and imaging results, discussing patient care, documentation in the medical record, and time spent with family or caregiver.        Patient Care Considerations:          Consultants/Shared Management Plan:    Hospitalist: I have discussed the case with the admitting hospitalist who agrees to accept the patient for admission.  Consultant: I have discussed the case with the patient's on-call nephrologist, who agrees to consult on the patient.    Social Determinants of Health:    Patient is independent, reliable, and has access to care.       Disposition and Care Coordination:    Admit:   Through independent evaluation of the patient's history, physical, and imperical data, the patient meets criteria for inpatient admission to the hospital.        Final diagnoses:   Acute renal failure superimposed on chronic kidney disease, unspecified acute renal failure type, unspecified CKD stage   Syncope and collapse   Hypotension, unspecified hypotension type        ED Disposition       ED Disposition   Decision to Admit    Condition   --    Comment   Level of Care: Remote Telemetry [26]   Diagnosis: Syncope and collapse  [780.2.ICD-9-CM]   Certification: I Certify That Inpatient Hospital Services Are Medically Necessary For Greater Than 2 Midnights                 This medical record created using voice recognition software.             Garrett Hill MD  05/25/24 [id number]

## 2024-05-26 NOTE — PLAN OF CARE
Goal Outcome Evaluation:  Plan of Care Reviewed With: patient        Progress: no change  Outcome Evaluation: Admitted from ED. NPO. Vss. Orthostatic BP in chart. C/O of back pain, no intervention/med requested. Difficulty sleeping. Will continue plan of care.

## 2024-05-26 NOTE — CONSULTS
[facility]   Consult Note    Patient Name: Jonathan Guerra  : 1955  MRN: [id number]  Primary Care Physician:  Provider, No Known  Referring Physician: No ref. provider found  Date of admission: 2024    Subjective   Subjective     Reason for Consult/ Chief Complaint: DARIEL on CKD    HPI:  Jonathan Guerra is a 68 y.o. male with past medical history of hypertension, hyperlipidemia and COPD who presented with generalized weakness, dizziness lightheadedness which have been going on for about 3 weeks.  He also witnessed syncopal episode.  He did not experience any head injury.  He denies any fevers or chills but mentioned decreased urinary output.  Creatinine on presentation was 4.1 from baseline of approximately 2.  He was also hyperkalemic with anion gap metabolic acidosis.  Nephrology is consulted for DARIEL and hyperkalemia.    Review of Systems    Review of Systems     12 point review of system was done and it was negative except as in HPI.    Personal History     Past Medical History:   Diagnosis Date    Acid reflux     Acid reflux disease     Anemia, unspecified     Anxiety     Arthritis     Asthma     Broken bones     Calcaneus fracture, left     Chronic bronchitis     COPD (chronic obstructive pulmonary disease)     Depression     Diverticulitis     Emphysema lung     Head injury     skull fracture     Hernia cerebri     HTN (hypertension)     Hyperlipemia     Lung disease        Past Surgical History:   Procedure Laterality Date    COLONOSCOPY  2019    ENDOSCOPY         Family History: family history includes Colon cancer in his mother and paternal grandmother; Diabetes in his sister; Heart disease in his mother; Other in his mother. Otherwise pertinent FHx was reviewed and not pertinent to current issue.    Social History:  reports that he quit smoking about 18 years ago. His smoking use included cigarettes. He started smoking about 58 years ago. He has a 60 pack-year smoking history. He  has been exposed to tobacco smoke. He has never used smokeless tobacco. He reports current alcohol use. He reports that he does not use drugs.    Home Medications:  Budeson-Glycopyrrol-Formoterol, albuterol sulfate HFA, atorvastatin, cetirizine, diclofenac, famotidine, ipratropium-albuterol, ketotifen, levoFLOXacin, lisinopril, lisinopril-hydrochlorothiazide, meclizine, methocarbamol, metoprolol succinate XL, omeprazole, predniSONE, and promethazine    Allergies:  Allergies   Allergen Reactions    Azithromycin Unknown - High Severity    Codeine Unknown - High Severity    Penicillins Unknown - High Severity       Objective    Objective     Vitals:   Temp:  [97.3 °F (36.3 °C)] 97.3 °F (36.3 °C)  Heart Rate:  [108-115] 108  Resp:  [24] 24  BP: (91-98)/(60-84) 98/84    Physical Exam:  Physical Exam  Vitals reviewed.   Constitutional:       Appearance: Normal appearance.   HENT:      Head: Normocephalic and atraumatic.   Eyes:      General: No scleral icterus.     Pupils: Pupils are equal, round, and reactive to light.   Cardiovascular:      Rate and Rhythm: Normal rate and regular rhythm.      Heart sounds: Normal heart sounds. No murmur heard.  Pulmonary:      Effort: Pulmonary effort is normal.      Breath sounds: Normal breath sounds.   Abdominal:      General: There is no distension.      Palpations: Abdomen is soft.      Tenderness: There is no abdominal tenderness. There is no guarding.   Musculoskeletal:      Cervical back: Neck supple.      Right lower leg: No edema.      Left lower leg: No edema.   Skin:     General: Skin is warm and dry.      Findings: No rash.   Neurological:      General: No focal deficit present.      Mental Status: He is alert and oriented to person, place, and time.   Psychiatric:         Mood and Affect: Mood normal.         Behavior: Behavior normal.          Result Review    Result Review:  I have personally reviewed the results from the time of this admission to 5/25/2024 21:00 EDT  and agree with these findings:  [x]  Laboratory  []  Microbiology  [x]  Radiology  [x]  EKG/Telemetry   []  Cardiology/Vascular   []  Pathology  []  Old records  []  Other:      Assessment & Plan   Assessment / Plan     Active Hospital Problems:  There are no active hospital problems to display for this patient.      Plan:   DARIEL on CKD 3B    -Baseline Cr ~2-2.2  -Creatinine on presentation was 4.1 which is down trended to 3.4 with IV fluid resuscitation  -Urinalysis shows 30 proteinuria and hyaline casts.  This is suggestive of prerenal etiology  -Renal ultrasound pending  -Etiology of DARIEL: Pre-renal secondary to hypotention. Patient is also on ACEi and NSAIDs as per home meds  -Patient has good urine output  -Avoid nephrotoxic medications like NSAIDs and IV contrasts and maintain a MAP goal of >65  -Hold home lisinopril  -Continue bicarb containing IV fluids.  I have reduced her weight of infusion      AGMA  -Most likely secondary to DARIEL  -Lactate, salicylate and acetaminophen levels normal  -There is no hx of DM and he is not on UCFH3fs  -Bicarb infusion as above      Hyperkalemia  -Resolved  -Maintain on renal diet        Electronically signed by Raúl Myles MD, 05/25/24, 9:00 PM EDT.

## 2024-05-26 NOTE — PLAN OF CARE
Goal Outcome Evaluation:              Outcome Evaluation: VSS. Pt had no complaints. Pt tolerated ambulating in room well. Pt rested well throguhout shift. No significant changes. Continue plan of care.

## 2024-05-26 NOTE — H&P
[facility]   HOSPITALIST HISTORY AND PHYSICAL  Date: 2024   Patient Name: Jonathan Guerra  : 1955  MRN: [id number]  Primary Care Physician:  Brittny, No Known  Date of admission: 2024    Subjective   Subjective     Chief Complaint: Generalized weakness and dizzy    HPI:    Jonathan Guerra is a 68 y.o. male with past medical history significant for COPD, HTN, HLD, and GERD who presented for generalized weakness and dizzy    Patient stated he has been feeling generalized weakness and dizzy for about 3 weeks.  He said he had a syncopal episode when he was walking to the kitchen about 2 weeks ago.  Reported lasting for about 1 second, he fell and hit his left elbow.  Not hitting head.  Said he was started on some new medications by his PCP recently, although he does not remember the names.  He also endorses making less urine lately.  He denies fever, no chills, no chest pain, no dysuria.      Personal History     Past Medical History:  Past Medical History:   Diagnosis Date    Acid reflux     Acid reflux disease     Anemia, unspecified     Anxiety     Arthritis     Asthma     Broken bones     Calcaneus fracture, left     Chronic bronchitis     COPD (chronic obstructive pulmonary disease)     Depression     Diverticulitis     Emphysema lung     Head injury     skull fracture     Hernia cerebri     HTN (hypertension)     Hyperlipemia     Lung disease            Past Surgical History:  Past Surgical History:   Procedure Laterality Date    COLONOSCOPY  2019    ENDOSCOPY         Family History:   Family History   Problem Relation Age of Onset    Other Mother         Thyroid Gland Neoplasm    Heart disease Mother     Colon cancer Mother     Diabetes Sister     Colon cancer Paternal Grandmother        Social History:   Social History     Socioeconomic History    Marital status: Legally    Tobacco Use    Smoking status: Former     Current packs/day: 0.00     Average packs/day: 1.5  packs/day for 40.0 years (60.0 ttl pk-yrs)     Types: Cigarettes     Start date:      Quit date: 2006     Years since quittin.4     Passive exposure: Past    Smokeless tobacco: Never   Vaping Use    Vaping status: Never Used   Substance and Sexual Activity    Alcohol use: Yes     Comment: Ocassional     Drug use: Never    Sexual activity: Defer       Home Medications:  Budeson-Glycopyrrol-Formoterol, albuterol sulfate HFA, atorvastatin, cetirizine, diclofenac, famotidine, ipratropium-albuterol, ketotifen, levoFLOXacin, lisinopril, lisinopril-hydrochlorothiazide, meclizine, methocarbamol, metoprolol succinate XL, omeprazole, predniSONE, and promethazine    Allergies:  Allergies   Allergen Reactions    Azithromycin Unknown - High Severity    Codeine Unknown - High Severity    Penicillins Unknown - High Severity       Review of Systems   All systems were reviewed and negative except for indicated in HPI    Objective   Objective     Vitals:   Temp:  [97.3 °F (36.3 °C)] 97.3 °F (36.3 °C)  Heart Rate:  [102-115] 109  Resp:  [24] 24  BP: ()/(48-84) 107/62    Physical Exam    Constitutional: Awake, alert, no acute distress   Eyes: Pupils equal, sclerae anicteric, no conjunctival injection   HENT: NCAT, mucous membranes moist   Neck: Supple, no thyromegaly, no lymphadenopathy, trachea midline   Respiratory: Clear to auscultation bilaterally, nonlabored respirations    Cardiovascular: RRR, no murmurs, rubs, or gallops, palpable pedal pulses bilaterally   Gastrointestinal: Positive bowel sounds, soft, nontender, nondistended   Musculoskeletal: No bilateral ankle edema, no clubbing or cyanosis to extremities   Psychiatric: Appropriate affect, cooperative   Neurologic: Oriented x 3, strength symmetric in all extremities, Cranial Nerves grossly intact to confrontation, speech clear   Skin: No rashes     Result Review    Result Review:  I have personally reviewed the results from the time of this admission to  5/25/2024 21:54 EDT and agree with these findings:  [x]  Laboratory  [x]  Microbiology  [x]  Radiology  [x]  EKG/Telemetry   [x]  Cardiology/Vascular   []  Pathology  []  Old records  []  Other:    Laboratory Studies:  Recent Results (from the past 24 hour(s))   ECG 12 Lead ED Triage Standing Order; Weak / Dizzy / AMS    Collection Time: 05/25/24  7:33 PM   Result Value Ref Range    QT Interval 287 ms    QTC Interval 393 ms   Comprehensive Metabolic Panel    Collection Time: 05/25/24  7:43 PM    Specimen: Blood   Result Value Ref Range    Glucose 149 (H) 65 - 99 mg/dL    BUN 80 (H) 8 - 23 mg/dL    Creatinine 4.13 (H) 0.76 - 1.27 mg/dL    Sodium 137 136 - 145 mmol/L    Potassium 5.4 (H) 3.5 - 5.2 mmol/L    Chloride 112 (H) 98 - 107 mmol/L    CO2 11.5 (L) 22.0 - 29.0 mmol/L    Calcium 9.1 8.6 - 10.5 mg/dL    Total Protein 6.6 6.0 - 8.5 g/dL    Albumin 3.6 3.5 - 5.2 g/dL    ALT (SGPT) 14 1 - 41 U/L    AST (SGOT) 11 1 - 40 U/L    Alkaline Phosphatase 115 39 - 117 U/L    Total Bilirubin 0.3 0.0 - 1.2 mg/dL    Globulin 3.0 gm/dL    A/G Ratio 1.2 g/dL    BUN/Creatinine Ratio 19.4 7.0 - 25.0    Anion Gap 13.5 5.0 - 15.0 mmol/L    eGFR 15.1 (L) >60.0 mL/min/1.73   Single High Sensitivity Troponin T    Collection Time: 05/25/24  7:43 PM    Specimen: Blood   Result Value Ref Range    HS Troponin T 40 (H) <22 ng/L   Magnesium    Collection Time: 05/25/24  7:43 PM    Specimen: Blood   Result Value Ref Range    Magnesium 1.9 1.6 - 2.4 mg/dL   Green Top (Gel)    Collection Time: 05/25/24  7:43 PM   Result Value Ref Range    Extra Tube Hold for add-ons.    Lavender Top    Collection Time: 05/25/24  7:43 PM   Result Value Ref Range    Extra Tube hold for add-on    Gold Top - SST    Collection Time: 05/25/24  7:43 PM   Result Value Ref Range    Extra Tube Hold for add-ons.    Light Blue Top    Collection Time: 05/25/24  7:43 PM   Result Value Ref Range    Extra Tube Hold for add-ons.    CBC Auto Differential    Collection Time:  05/25/24  7:43 PM    Specimen: Blood   Result Value Ref Range    WBC 7.88 3.40 - 10.80 10*3/mm3    RBC 3.72 (L) 4.14 - 5.80 10*6/mm3    Hemoglobin 11.4 (L) 13.0 - 17.7 g/dL    Hematocrit 35.3 (L) 37.5 - 51.0 %    MCV 94.9 79.0 - 97.0 fL    MCH 30.6 26.6 - 33.0 pg    MCHC 32.3 31.5 - 35.7 g/dL    RDW 12.3 12.3 - 15.4 %    RDW-SD 42.7 37.0 - 54.0 fl    MPV 10.9 6.0 - 12.0 fL    Platelets 202 140 - 450 10*3/mm3    Neutrophil % 89.0 (H) 42.7 - 76.0 %    Lymphocyte % 5.7 (L) 19.6 - 45.3 %    Monocyte % 3.3 (L) 5.0 - 12.0 %    Eosinophil % 0.8 0.3 - 6.2 %    Basophil % 0.1 0.0 - 1.5 %    Immature Grans % 1.1 (H) 0.0 - 0.5 %    Neutrophils, Absolute 7.01 (H) 1.70 - 7.00 10*3/mm3    Lymphocytes, Absolute 0.45 (L) 0.70 - 3.10 10*3/mm3    Monocytes, Absolute 0.26 0.10 - 0.90 10*3/mm3    Eosinophils, Absolute 0.06 0.00 - 0.40 10*3/mm3    Basophils, Absolute 0.01 0.00 - 0.20 10*3/mm3    Immature Grans, Absolute 0.09 (H) 0.00 - 0.05 10*3/mm3    nRBC 0.0 0.0 - 0.2 /100 WBC   Procalcitonin    Collection Time: 05/25/24  7:43 PM    Specimen: Blood   Result Value Ref Range    Procalcitonin 0.43 (H) 0.00 - 0.25 ng/mL   Salicylate Level    Collection Time: 05/25/24  7:43 PM    Specimen: Blood   Result Value Ref Range    Salicylate <0.3 <=30.0 mg/dL   Acetaminophen Level    Collection Time: 05/25/24  7:43 PM    Specimen: Blood   Result Value Ref Range    Acetaminophen <5.0 0.0 - 30.0 mcg/mL   Urinalysis With Microscopic If Indicated (No Culture) - Urine, Clean Catch    Collection Time: 05/25/24  9:09 PM    Specimen: Urine, Clean Catch   Result Value Ref Range    Color, UA Yellow Yellow, Straw    Appearance, UA Clear Clear    pH, UA <=5.0 5.0 - 8.0    Specific Gravity, UA 1.021 1.005 - 1.030    Glucose, UA Negative Negative    Ketones, UA Trace (A) Negative    Bilirubin, UA Negative Negative    Blood, UA Negative Negative    Protein, UA 30 mg/dL (1+) (A) Negative    Leuk Esterase, UA Negative Negative    Nitrite, UA Negative Negative     Urobilinogen, UA 0.2 E.U./dL 0.2 - 1.0 E.U./dL   Urinalysis, Microscopic Only - Urine, Clean Catch    Collection Time: 05/25/24  9:09 PM    Specimen: Urine, Clean Catch   Result Value Ref Range    RBC, UA 0-2 None Seen, 0-2 /HPF    WBC, UA 0-2 None Seen, 0-2 /HPF    Bacteria, UA None Seen None Seen /HPF    Squamous Epithelial Cells, UA 0-2 None Seen, 0-2 /HPF    Hyaline Casts, UA 3-6 None Seen /LPF    Methodology Automated Microscopy    VBG with Co-Ox and Electrolytes    Collection Time: 05/25/24  9:12 PM    Specimen: Venous Blood   Result Value Ref Range    pH, Venous 7.196 (C) 7.310 - 7.410 pH Units    pCO2, Venous 30.3 (L) 41.0 - 51.0 mm Hg    pO2, Venous <40.5 35.0 - 42.0 mm Hg    HCO3, Venous 11.5 (L) 22.0 - 26.0 mmol/L    Base Excess, Venous -15.3 (L) -2.0 - 2.0 mmol/L    O2 Saturation, Venous 60.8 45.0 - 75.0 %    Hemoglobin, Blood Gas 12.1 (L) 13.8 - 16.4 g/dL    Carboxyhemoglobin 0.7 0 - 1.5 %    Methemoglobin 0.10 0.00 - 1.50 %    Oxyhemoglobin 60.3 (L) 94 - 99 %    FHHB 38.9 (H) 0.0 - 5.0 %    Note      Site Drawn by RN     Modality Room Air     FIO2      Flow Rate      Sodium, Venous 136.6 136 - 146 mmol/L    Potassium, Venous 5.0 3.5 - 5.0 mmol/L    Ionized Calcium, Arterial 1.27 1.13 - 1.32 mmol/L    Chloride, Venous  112 (H) 98 - 106 mmol/L    Glucose, Arterial 103 (H) 70 - 99 mg/dL    Lactate, Arterial 1.26 0.5 - 2 mmol/L       Imaging:  XR Chest 1 View    Result Date: 5/25/2024  Narrative: AP PORTABLE CHEST  HISTORY: Weakness and dizziness.  COMPARISON: 9/29/2022  TECHNIQUE: AP portable chest x-ray.  FINDINGS: Cardiac and mediastinal contours are normal. Pulmonary vascularity is normal. The lungs are clear except for scarring in the apices. No pneumothorax is identified.      Impression: No acute cardiopulmonary findings.  Electronically Signed By-Dr. Arjun Andrade MD On:5/25/2024 7:43 PM       EKG: (my review): Sinus tachycardia, heart rate 113, QTc 393, no ST elevation or  "depression    Assessment & Plan   Assessment / Plan     -I have discussed the case with the ED physician.  I have seen patient bedside.  I have independently reviewed his EKG, labs, imaging, and record.    Echocardiogram done on 3/26/2023:  \"  Left ventricular systolic function is normal. Left ventricular ejection fraction appears to be 61 - 65%.    Left ventricular diastolic function is consistent with (grade I) impaired relaxation.    There are no hemodynamically significant valvular abnormalities.    Estimated right ventricular systolic pressure from tricuspid regurgitation is normal (<35 mmHg).\"    Generalized weakness  Syncope, dizziness  DARIEL on CKD 3  Anion gap metabolic acidosis  Uremia  Elevated troponin  Hyperkalemia  Hypotension  Other chronic problems: COPD, HTN, HLD, and GERD    -His symptoms of generalized weakness and dizziness could be related to his worsening renal function.  DARIEL is likely from a combinations of hypotension, ACE inhibitor, and NSAID use.  -I will check for orthostatic vitals, TSH, CT head without contrast, and carotid ultrasound for workup of syncope.  Keep on telemetry.  Echocardiogram results last year as above  -Nephrology has been consulted by the ED.  Checking renal ultrasound, giving Lokelma and bicarb drip.  Appreciate further recs from nephrology  -Hold home BP meds and nephrotoxic medications  -Continue to trend troponin  -Per patient, he has been on prednisone for about 3 weeks prescribed by his PCP for his symptoms.  Will continue this for now, consider titrating down.    Resume home medications when appropriate.  DVT prophylaxis with heparin.      CODE STATUS:    Level Of Support Discussed With: Patient  Code Status (Patient has no pulse and is not breathing): CPR (Attempt to Resuscitate)  Medical Interventions (Patient has pulse or is breathing): Full Support      Admission Status:  I believe this patient meets admission status.    Electronically signed by Juan Jovel MD, " 05/25/24, 9:33 PM EDT.

## 2024-05-27 ENCOUNTER — READMISSION MANAGEMENT (OUTPATIENT)
Dept: CALL CENTER | Facility: HOSPITAL | Age: 69
End: 2024-05-27
Payer: MEDICARE

## 2024-05-27 VITALS
TEMPERATURE: 98.1 F | HEIGHT: 65 IN | RESPIRATION RATE: 16 BRPM | OXYGEN SATURATION: 93 % | WEIGHT: 144.4 LBS | BODY MASS INDEX: 24.06 KG/M2 | HEART RATE: 84 BPM | DIASTOLIC BLOOD PRESSURE: 49 MMHG | SYSTOLIC BLOOD PRESSURE: 112 MMHG

## 2024-05-27 PROBLEM — N18.32 CKD STAGE 3B, GFR 30-44 ML/MIN: Status: ACTIVE | Noted: 2024-05-27

## 2024-05-27 LAB
ANION GAP SERPL CALCULATED.3IONS-SCNC: 11 MMOL/L (ref 5–15)
BASOPHILS # BLD AUTO: 0.02 10*3/MM3 (ref 0–0.2)
BASOPHILS NFR BLD AUTO: 0.2 % (ref 0–1.5)
BUN SERPL-MCNC: 39 MG/DL (ref 8–23)
BUN/CREAT SERPL: 21.1 (ref 7–25)
CALCIUM SPEC-SCNC: 8.1 MG/DL (ref 8.6–10.5)
CHLORIDE SERPL-SCNC: 101 MMOL/L (ref 98–107)
CO2 SERPL-SCNC: 29 MMOL/L (ref 22–29)
CORTIS SERPL-MCNC: 0.75 MCG/DL
CREAT SERPL-MCNC: 1.85 MG/DL (ref 0.76–1.27)
DEPRECATED RDW RBC AUTO: 39.8 FL (ref 37–54)
DEPRECATED RDW RBC AUTO: 40.4 FL (ref 37–54)
EGFRCR SERPLBLD CKD-EPI 2021: 39.7 ML/MIN/1.73
EOSINOPHIL # BLD AUTO: 0.5 10*3/MM3 (ref 0–0.4)
EOSINOPHIL NFR BLD AUTO: 4.8 % (ref 0.3–6.2)
ERYTHROCYTE [DISTWIDTH] IN BLOOD BY AUTOMATED COUNT: 12.2 % (ref 12.3–15.4)
ERYTHROCYTE [DISTWIDTH] IN BLOOD BY AUTOMATED COUNT: 12.3 % (ref 12.3–15.4)
GLUCOSE SERPL-MCNC: 113 MG/DL (ref 65–99)
HCT VFR BLD AUTO: 28.6 % (ref 37.5–51)
HCT VFR BLD AUTO: 30 % (ref 37.5–51)
HGB BLD-MCNC: 10.3 G/DL (ref 13–17.7)
HGB BLD-MCNC: 9.7 G/DL (ref 13–17.7)
IMM GRANULOCYTES # BLD AUTO: 0.05 10*3/MM3 (ref 0–0.05)
IMM GRANULOCYTES NFR BLD AUTO: 0.5 % (ref 0–0.5)
IRON 24H UR-MRATE: 98 MCG/DL (ref 59–158)
IRON SATN MFR SERPL: 38 % (ref 20–50)
LYMPHOCYTES # BLD AUTO: 1.46 10*3/MM3 (ref 0.7–3.1)
LYMPHOCYTES NFR BLD AUTO: 13.9 % (ref 19.6–45.3)
MAGNESIUM SERPL-MCNC: 1.4 MG/DL (ref 1.6–2.4)
MCH RBC QN AUTO: 30.5 PG (ref 26.6–33)
MCH RBC QN AUTO: 31.1 PG (ref 26.6–33)
MCHC RBC AUTO-ENTMCNC: 33.9 G/DL (ref 31.5–35.7)
MCHC RBC AUTO-ENTMCNC: 34.3 G/DL (ref 31.5–35.7)
MCV RBC AUTO: 89.9 FL (ref 79–97)
MCV RBC AUTO: 90.6 FL (ref 79–97)
MONOCYTES # BLD AUTO: 0.86 10*3/MM3 (ref 0.1–0.9)
MONOCYTES NFR BLD AUTO: 8.2 % (ref 5–12)
NEUTROPHILS NFR BLD AUTO: 7.62 10*3/MM3 (ref 1.7–7)
NEUTROPHILS NFR BLD AUTO: 72.4 % (ref 42.7–76)
NRBC BLD AUTO-RTO: 0 /100 WBC (ref 0–0.2)
PHOSPHATE SERPL-MCNC: 3.6 MG/DL (ref 2.5–4.5)
PLATELET # BLD AUTO: 176 10*3/MM3 (ref 140–450)
PLATELET # BLD AUTO: 180 10*3/MM3 (ref 140–450)
PMV BLD AUTO: 10.4 FL (ref 6–12)
PMV BLD AUTO: 11 FL (ref 6–12)
POTASSIUM SERPL-SCNC: 3.1 MMOL/L (ref 3.5–5.2)
RBC # BLD AUTO: 3.18 10*6/MM3 (ref 4.14–5.8)
RBC # BLD AUTO: 3.31 10*6/MM3 (ref 4.14–5.8)
SODIUM SERPL-SCNC: 141 MMOL/L (ref 136–145)
TIBC SERPL-MCNC: 258 MCG/DL (ref 298–536)
TRANSFERRIN SERPL-MCNC: 173 MG/DL (ref 200–360)
WBC NRBC COR # BLD AUTO: 10.51 10*3/MM3 (ref 3.4–10.8)
WBC NRBC COR # BLD AUTO: 13.53 10*3/MM3 (ref 3.4–10.8)

## 2024-05-27 PROCEDURE — 63710000001 PREDNISONE PER 5 MG: Performed by: STUDENT IN AN ORGANIZED HEALTH CARE EDUCATION/TRAINING PROGRAM

## 2024-05-27 PROCEDURE — 94664 DEMO&/EVAL PT USE INHALER: CPT

## 2024-05-27 PROCEDURE — 83735 ASSAY OF MAGNESIUM: CPT | Performed by: STUDENT IN AN ORGANIZED HEALTH CARE EDUCATION/TRAINING PROGRAM

## 2024-05-27 PROCEDURE — 84100 ASSAY OF PHOSPHORUS: CPT | Performed by: STUDENT IN AN ORGANIZED HEALTH CARE EDUCATION/TRAINING PROGRAM

## 2024-05-27 PROCEDURE — 85027 COMPLETE CBC AUTOMATED: CPT | Performed by: STUDENT IN AN ORGANIZED HEALTH CARE EDUCATION/TRAINING PROGRAM

## 2024-05-27 PROCEDURE — 94799 UNLISTED PULMONARY SVC/PX: CPT

## 2024-05-27 PROCEDURE — 84466 ASSAY OF TRANSFERRIN: CPT | Performed by: STUDENT IN AN ORGANIZED HEALTH CARE EDUCATION/TRAINING PROGRAM

## 2024-05-27 PROCEDURE — 82533 TOTAL CORTISOL: CPT | Performed by: STUDENT IN AN ORGANIZED HEALTH CARE EDUCATION/TRAINING PROGRAM

## 2024-05-27 PROCEDURE — 99239 HOSP IP/OBS DSCHRG MGMT >30: CPT | Performed by: STUDENT IN AN ORGANIZED HEALTH CARE EDUCATION/TRAINING PROGRAM

## 2024-05-27 PROCEDURE — 80048 BASIC METABOLIC PNL TOTAL CA: CPT | Performed by: STUDENT IN AN ORGANIZED HEALTH CARE EDUCATION/TRAINING PROGRAM

## 2024-05-27 PROCEDURE — 85025 COMPLETE CBC W/AUTO DIFF WBC: CPT | Performed by: STUDENT IN AN ORGANIZED HEALTH CARE EDUCATION/TRAINING PROGRAM

## 2024-05-27 PROCEDURE — 25010000002 MAGNESIUM SULFATE 2 GM/50ML SOLUTION: Performed by: STUDENT IN AN ORGANIZED HEALTH CARE EDUCATION/TRAINING PROGRAM

## 2024-05-27 PROCEDURE — 83540 ASSAY OF IRON: CPT | Performed by: STUDENT IN AN ORGANIZED HEALTH CARE EDUCATION/TRAINING PROGRAM

## 2024-05-27 PROCEDURE — 25010000002 HEPARIN (PORCINE) PER 1000 UNITS: Performed by: STUDENT IN AN ORGANIZED HEALTH CARE EDUCATION/TRAINING PROGRAM

## 2024-05-27 PROCEDURE — 0 DEXTROSE 5 % SOLUTION: Performed by: STUDENT IN AN ORGANIZED HEALTH CARE EDUCATION/TRAINING PROGRAM

## 2024-05-27 RX ORDER — PREDNISONE 20 MG/1
10 TABLET ORAL DAILY
Qty: 3 TABLET | Refills: 0 | Status: SHIPPED | OUTPATIENT
Start: 2024-05-27 | End: 2024-06-01

## 2024-05-27 RX ORDER — MAGNESIUM SULFATE HEPTAHYDRATE 40 MG/ML
2 INJECTION, SOLUTION INTRAVENOUS ONCE
Status: COMPLETED | OUTPATIENT
Start: 2024-05-27 | End: 2024-05-27

## 2024-05-27 RX ORDER — POTASSIUM CHLORIDE 750 MG/1
40 CAPSULE, EXTENDED RELEASE ORAL ONCE
Status: COMPLETED | OUTPATIENT
Start: 2024-05-27 | End: 2024-05-27

## 2024-05-27 RX ORDER — PREDNISONE 10 MG/1
10 TABLET ORAL DAILY
Status: DISCONTINUED | OUTPATIENT
Start: 2024-05-27 | End: 2024-05-27 | Stop reason: HOSPADM

## 2024-05-27 RX ADMIN — ALBUTEROL SULFATE 2 PUFF: 90 AEROSOL, METERED RESPIRATORY (INHALATION) at 00:06

## 2024-05-27 RX ADMIN — POTASSIUM CHLORIDE 40 MEQ: 750 CAPSULE, EXTENDED RELEASE ORAL at 09:18

## 2024-05-27 RX ADMIN — ALBUTEROL SULFATE 2 PUFF: 90 AEROSOL, METERED RESPIRATORY (INHALATION) at 11:38

## 2024-05-27 RX ADMIN — PREDNISONE 10 MG: 10 TABLET ORAL at 09:18

## 2024-05-27 RX ADMIN — CETIRIZINE HYDROCHLORIDE 10 MG: 10 TABLET, FILM COATED ORAL at 09:18

## 2024-05-27 RX ADMIN — ALBUTEROL SULFATE 2 PUFF: 90 AEROSOL, METERED RESPIRATORY (INHALATION) at 06:15

## 2024-05-27 RX ADMIN — HEPARIN SODIUM 5000 UNITS: 5000 INJECTION INTRAVENOUS; SUBCUTANEOUS at 05:05

## 2024-05-27 RX ADMIN — HEPARIN SODIUM 5000 UNITS: 5000 INJECTION INTRAVENOUS; SUBCUTANEOUS at 14:22

## 2024-05-27 RX ADMIN — BUDESONIDE AND FORMOTEROL FUMARATE DIHYDRATE 2 PUFF: 160; 4.5 AEROSOL RESPIRATORY (INHALATION) at 06:15

## 2024-05-27 RX ADMIN — MAGNESIUM SULFATE HEPTAHYDRATE 2 G: 40 INJECTION, SOLUTION INTRAVENOUS at 09:17

## 2024-05-27 RX ADMIN — SODIUM BICARBONATE 150 MEQ: 84 INJECTION, SOLUTION INTRAVENOUS at 05:05

## 2024-05-27 RX ADMIN — Medication 10 ML: at 09:18

## 2024-05-27 NOTE — OUTREACH NOTE
Prep Survey      Flowsheet Row Responses   [facility] facility patient discharged from? [facility]   Is LACE score < 7 ? No   Eligibility Readm Mgmt   Discharge diagnosis Syncope and collapse   Does the patient have one of the following disease processes/diagnoses(primary or secondary)? Other   Does the patient have Home health ordered? No   Is there a DME ordered? No   Prep survey completed? Yes            Nereida MARTÍNEZ - Registered Nurse

## 2024-05-27 NOTE — PLAN OF CARE
Goal Outcome Evaluation:           Progress: improving   Pt is alert and oriented x4, vss, walks around ad luis. Pt discharging home with his wife who is providing the ride

## 2024-05-27 NOTE — DISCHARGE SUMMARY
[facility]         HOSPITALIST  DISCHARGE SUMMARY    Patient Name: Jonathan Guerra  : 1955  MRN: [id number]    Date of Admission: 2024  Date of Discharge:  2024  Primary Care Physician: Provider, No Known    Consults       Date and Time Order Name Status Description    2024  8:53 PM Nephrology  (on-call MD unless specified) Completed             Active and Resolved Hospital Problems:  Active Hospital Problems    Diagnosis POA   • **Syncope and collapse [R55] Yes   • CKD stage 3b, GFR 30-44 ml/min [N18.32] Unknown   • DARIEL (acute kidney injury) [N17.9] Yes      Resolved Hospital Problems   No resolved problems to display.       Hospital Course     Hospital Course:  Jonathan Guerra is a 68 y.o. male with past medical history significant for COPD, HTN, HLD, and GERD who presented for generalized weakness and dizzy for 3 weeks.  He also had a syncopal episode about 2 weeks ago when he was walking to the kitchen which lasted for few seconds, he fell and hit his left elbow, denied hitting his head.  Mention he was started on some new medication by his PCP but could not recall the names.  Blood pressure low on presentation with documented blood pressure of 80/48.  Blood work showed DARIEL with creatinine 4.13 (baseline 2-2.2).  Also found to have hyperkalemia 5.4.  Troponin was 40 on presentation which down trended to 22.  Elevated procalcitonin of 0.43.  UA was negative for any UTI.Chest x-ray showed no acute cardiopulmonary findings.  Patient was admitted for further evaluation and management.  Nephrology was consulted. Patient's DARIEL on CKD resolved. Was also on Bicarb drip for metabolic acidosis, discontinued today given it resolved. Patient' blood pressure low normal even without home antihypertensive medications for which it was held throughout the hospitalization. His presenting symptoms resolved. His home lisinopril and metoprolol succinate discontinued at the time of  discharge.    Patient is being discharged home today. He stable at the time of discharge. He will follow up with his PCP in 2-3 days; needs CBC and chemistries trended during follow up. Follow up with Nephrology in 1 week Advised to make log book of his daily blood pressure and provide it during his follow up visit. Prednisone being taper; will take 10 mg daily for 5 days and then stop. Fall precautions.        DISCHARGE Follow Up Recommendations for labs and diagnostics: as mentioned above.      Day of Discharge     Vital Signs:  Temp:  [97.7 °F (36.5 °C)-98.4 °F (36.9 °C)] 98.1 °F (36.7 °C)  Heart Rate:  [75-85] 84  Resp:  [16-18] 16  BP: ()/(45-57) 112/49  Physical Exam:   General: Awake, alert, NAD  Cardiovascular: RRR, no murmurs   Pulmonary: CTA bilaterally; no wheezes; no conversational dyspnea  Gastrointestinal: S/ND/NT, +BS  Neuro: Alert, awake, oriented x 3; speech clear; no tremor     Discharge Details        Discharge Medications        Changes to Medications        Instructions Start Date   predniSONE 20 MG tablet  Commonly known as: DELTASONE  What changed: See the new instructions.   10 mg, Oral, Daily             Continue These Medications        Instructions Start Date   albuterol sulfate  (90 Base) MCG/ACT inhaler  Commonly known as: PROVENTIL HFA;VENTOLIN HFA;PROAIR HFA   2 puffs, Inhalation, 4 Times Daily - RT      atorvastatin 80 MG tablet  Commonly known as: LIPITOR   80 mg, Oral, Daily      Breztri Aerosphere 160-9-4.8 MCG/ACT aerosol inhaler  Generic drug: Budeson-Glycopyrrol-Formoterol   2 puffs, Inhalation, 2 Times Daily      cetirizine 10 MG tablet  Commonly known as: zyrTEC   10 mg, Oral, Daily      ketotifen 0.025 % ophthalmic solution  Commonly known as: ZADITOR   2 drops, Both Eyes, 2 Times Daily      meclizine 25 MG tablet  Commonly known as: ANTIVERT   25 mg, Oral, 3 Times Daily PRN             Stop These Medications      diclofenac 75 MG EC tablet  Commonly known as:  VOLTAREN     levoFLOXacin 500 MG tablet  Commonly known as: LEVAQUIN     lisinopril 20 MG tablet  Commonly known as: PRINIVIL,ZESTRIL     methocarbamol 750 MG tablet  Commonly known as: ROBAXIN     metoprolol succinate XL 25 MG 24 hr tablet  Commonly known as: Toprol XL     omeprazole 20 MG capsule  Commonly known as: priLOSEC              Allergies   Allergen Reactions   • Azithromycin Unknown - High Severity   • Codeine Unknown - High Severity   • Penicillins Unknown - High Severity       Discharge Disposition:  Home or Self Care    Diet:  Hospital:  No active diet order      Discharge Activity:       CODE STATUS:  Code Status and Medical Interventions:   Ordered at: 05/25/24 2131     Level Of Support Discussed With:    Patient     Code Status (Patient has no pulse and is not breathing):    CPR (Attempt to Resuscitate)     Medical Interventions (Patient has pulse or is breathing):    Full Support       No future appointments.    Additional Instructions for the Follow-ups that You Need to Schedule       Discharge Follow-up with PCP   As directed       Currently Documented PCP:    Provider, No Known    PCP Phone Number:    None     Follow Up Details: In 2-3 days; needs CBC and chemistires trended during follow up.                Pertinent  and/or Most Recent Results     PROCEDURES:       LAB RESULTS:      Lab 05/27/24  1450 05/27/24  0542 05/26/24  0136 05/26/24  0021 05/25/24 2112 05/25/24  1943   WBC 13.53* 10.51 8.08  --   --  7.88   HEMOGLOBIN 10.3* 9.7* 10.1*  --   --  11.4*   HEMATOCRIT 30.0* 28.6* 30.5*  --   --  35.3*   PLATELETS 180 176 159  --   --  202   NEUTROS ABS  --  7.62*  --   --   --  7.01*   IMMATURE GRANS (ABS)  --  0.05  --   --   --  0.09*   LYMPHS ABS  --  1.46  --   --   --  0.45*   MONOS ABS  --  0.86  --   --   --  0.26   EOS ABS  --  0.50*  --   --   --  0.06   MCV 90.6 89.9 93.0  --   --  94.9   PROCALCITONIN  --   --   --   --   --  0.43*   LACTATE  --   --   --  0.7  --   --     LACTATE, ARTERIAL  --   --   --   --  1.26  --          Lab 05/27/24  0542 05/26/24  0136 05/25/24 2112 05/25/24 1943   SODIUM 141 138  --  137   SODIUM, VENOUS  --   --  136.6  --    POTASSIUM 3.1* 4.1  --  5.4*   POTASSIUM, VENOUS  --   --  5.0  --    CHLORIDE 101 111*  --  112*   CHLORIDE, VENOUS  --   --  112*  --    CO2 29.0 14.6*  --  11.5*   ANION GAP 11.0 12.4  --  13.5   BUN 39* 64*  --  80*   CREATININE 1.85* 3.45*  --  4.13*   EGFR 39.7* 18.8*  --  15.1*   GLUCOSE 113* 160*  --  149*   GLUCOSE, ARTERIAL  --   --  103*  --    CALCIUM 8.1* 8.5*  --  9.1   IONIZED CALCIUM  --   --  1.27  --    MAGNESIUM 1.4* 1.7  --  1.9   PHOSPHORUS 3.6 3.8  --   --    TSH  --   --   --  1.510         Lab 05/26/24  0136 05/25/24 1943   TOTAL PROTEIN 5.2* 6.6   ALBUMIN 3.0* 3.6   GLOBULIN 2.2 3.0   ALT (SGPT) 11 14   AST (SGOT) 9 11   BILIRUBIN 0.2 0.3   ALK PHOS 104 115         Lab 05/26/24  0438 05/26/24  0136 05/25/24 1943   HSTROP T 21 22* 40*             Lab 05/27/24  0542   IRON 98   IRON SATURATION (TSAT) 38   TIBC 258*   TRANSFERRIN 173*         Lab 05/25/24 2112   CARBOXYHEMOGLOBIN 0.7     Brief Urine Lab Results  (Last result in the past 365 days)        Color   Clarity   Blood   Leuk Est   Nitrite   Protein   CREAT   Urine HCG        05/25/24 2109 Yellow   Clear   Negative   Negative   Negative   30 mg/dL (1+)                 Microbiology Results (last 10 days)       ** No results found for the last 240 hours. **            CT Head Without Contrast    Result Date: 5/26/2024  No acute intracranial abnormality.  White matter changes most compatible small vessel ischemic disease in this age group   Electronically Signed By-Karl Pickett On:5/26/2024 2:29 PM      US Renal Bilateral    Result Date: 5/26/2024  Unremarkable bilateral renal and bladder ultrasound   Electronically Signed By-Karl Pickett On:5/26/2024 10:03 AM      XR Chest 1 View    Result Date: 5/25/2024  No acute cardiopulmonary findings.   Electronically Signed By-Dr. Arjun Andrade MD On:5/25/2024 7:43 PM        Results for orders placed during the hospital encounter of 06/22/23    Duplex Carotid Ultrasound CAR    Interpretation Summary  •  Right internal carotid artery demonstrates a 50-69% stenosis.  •  Proximal left internal carotid artery plaque without significant stenosis.  •  Moderate carotid bulb bifurcation plaque bilaterally.  •  Vertebral flow is antegrade bilaterally.  •  No significant change from study dated 12/16/2022.      Results for orders placed during the hospital encounter of 06/22/23    Duplex Carotid Ultrasound CAR    Interpretation Summary  •  Right internal carotid artery demonstrates a 50-69% stenosis.  •  Proximal left internal carotid artery plaque without significant stenosis.  •  Moderate carotid bulb bifurcation plaque bilaterally.  •  Vertebral flow is antegrade bilaterally.  •  No significant change from study dated 12/16/2022.      Results for orders placed in visit on 03/22/23    Adult Transthoracic Echo Complete w/ Color, Spectral and Contrast if necessary per protocol    Interpretation Summary  •  Left ventricular systolic function is normal. Left ventricular ejection fraction appears to be 61 - 65%.  •  Left ventricular diastolic function is consistent with (grade I) impaired relaxation.  •  There are no hemodynamically significant valvular abnormalities.  •  Estimated right ventricular systolic pressure from tricuspid regurgitation is normal (<35 mmHg).      Labs Pending at Discharge:        Time spent on Discharge including face to face service:  35 minutes    Electronically signed by Caleb Contreras MD, 05/27/24, 4:54 PM EDT.

## 2024-05-27 NOTE — PROGRESS NOTES
[facility]     Progress Note    Patient Name: Jonathan Guerra  : 1955  MRN: [id number]  Primary Care Physician:  Provider, No Known  Date of admission: 2024    Subjective     Patient was seen today.  There were no acute events overnight.    Objective   Objective     Vitals:   Temp:  [97.7 °F (36.5 °C)-98.4 °F (36.9 °C)] 97.7 °F (36.5 °C)  Heart Rate:  [] 79  Resp:  [16-18] 16  BP: ()/(50-80) 99/52    Physical Exam   Physical Exam    Constitutional:       Appearance: Normal appearance.   HENT:      Head: Normocephalic and atraumatic.   Eyes:      General: No scleral icterus.     Pupils: Pupils are equal, round, and reactive to light.   Cardiovascular:      Rate and Rhythm: Normal rate and regular rhythm.      Heart sounds: Normal heart sounds. No murmur heard.  Pulmonary:      Effort: Pulmonary effort is normal.      Breath sounds: Normal breath sounds.   Abdominal:      General: There is no distension.      Palpations: Abdomen is soft.      Tenderness: There is no abdominal tenderness. There is no guarding.   Musculoskeletal:      Cervical back: Neck supple.      Right lower leg: No edema.      Left lower leg: No edema.   Skin:     General: Skin is warm and dry.      Findings: No rash.   Neurological:      General: No focal deficit present.      Mental Status: He is alert and oriented to person, place, and time.   Psychiatric:         Mood and Affect: Mood normal.         Behavior: Behavior normal.     Result Review    Result Review:  I have personally reviewed the results from the time of this admission to 2024 08:07 EDT and agree with these findings:  [x]  Laboratory  []  Microbiology  []  Radiology  []  EKG/Telemetry   []  Cardiology/Vascular   []  Pathology  []  Old records  []  Other:    Assessment & Plan   Assessment / Plan       Active Hospital Problems:    Active Hospital Problems    Diagnosis  POA    **Syncope and collapse [R55]  Yes    CKD stage 3b, GFR 30-44 ml/min  [N18.32]  Unknown    DARIEL (acute kidney injury) [N17.9]  Yes       Plan:   DARIEL on CKD 3B     -Baseline Cr ~2-2.2  -Creatinine on presentation was 4.1 which has down trended to 1.8 with IV fluid resuscitation  -Urinalysis shows 30 proteinuria and hyaline casts.  This is suggestive of prerenal etiology  -Renal ultrasound was unremarkable  -Etiology of DARIEL: Pre-renal secondary to hypotention. Patient is also on ACEi and NSAIDs as per home meds  -Patient has good urine output  -Avoid nephrotoxic medications like NSAIDs and IV contrasts and maintain a MAP goal of >65  -Hold home lisinopril  -Continue bicarb containing IV fluids.  Promote oral hydration        [id number]        Hypokalemia  -Secondary to bicarb infusion and hypomagnesemia  -Repletion ordered       Electronically signed by Raúl Myles MD, 05/27/24, 8:06 AM EDT.

## 2024-06-05 ENCOUNTER — TRANSCRIBE ORDERS (OUTPATIENT)
Dept: VASCULAR SURGERY | Facility: HOSPITAL | Age: 69
End: 2024-06-05
Payer: MEDICARE

## 2024-06-05 DIAGNOSIS — I65.21 STENOSIS OF RIGHT CAROTID ARTERY: Primary | ICD-10-CM

## 2024-06-18 ENCOUNTER — READMISSION MANAGEMENT (OUTPATIENT)
Dept: CALL CENTER | Facility: HOSPITAL | Age: 69
End: 2024-06-18
Payer: MEDICARE

## 2024-06-18 ENCOUNTER — TELEPHONE (OUTPATIENT)
Dept: VASCULAR SURGERY | Facility: HOSPITAL | Age: 69
End: 2024-06-18
Payer: MEDICARE

## 2024-06-18 NOTE — TELEPHONE ENCOUNTER
PATIENT HAS [facility] MEDICARE HMO. HE WILL CALL US IF HE CHANGES INSURANCE.

## 2024-06-18 NOTE — OUTREACH NOTE
Medical Week 3 Survey      Flowsheet Row Responses   [facility] patient discharged from? [facility]   Does the patient have one of the following disease processes/diagnoses(primary or secondary)? Other   Week 3 attempt successful? Yes   Call start time 1344   Call end time 1348   Discharge diagnosis Syncope and collapse   Meds reviewed with patient/caregiver? Yes   Is the patient having any side effects they believe may be caused by any medication additions or changes? No   Does the patient have all medications ordered at discharge? N/A   Is the patient taking all medications as directed (includes completed medication regime)? Yes   Medication comments Pt is aware of medication changes made at discharge but since discharge omeprazole, metoprolol and a statin med   Does the patient have a primary care provider?  Yes   Comments regarding PCP Pt has   Has the patient kept scheduled appointments due by today? Yes   Has home health visited the patient within 72 hours of discharge? N/A   Psychosocial issues? No   Did the patient receive a copy of their discharge instructions? Yes   Nursing interventions Reviewed instructions with patient   What is the patient's perception of their health status since discharge? Improving  [Denies any ongoing issues with feeling faint since BP meds changed. keeping a log and will update PCP for concerns.  Returned to his regular PCP and request appt cancelled with new PCP--will send a note.]   Is the patient/caregiver able to teach back signs and symptoms related to disease process for when to call PCP? Yes   Is the patient/caregiver able to teach back signs and symptoms related to disease process for when to call 911? Yes   Week 3 Call Completed? Yes   Graduated Yes   Call end time 1348            JAYNA WORLEY - Registered Nurse

## 2024-11-17 ENCOUNTER — HOSPITAL ENCOUNTER (EMERGENCY)
Facility: HOSPITAL | Age: 69
Discharge: HOME | End: 2024-11-17
Attending: EMERGENCY MEDICINE | Admitting: EMERGENCY MEDICINE
Payer: MEDICARE

## 2024-11-17 ENCOUNTER — APPOINTMENT (OUTPATIENT)
Dept: GENERAL RADIOLOGY | Facility: HOSPITAL | Age: 69
End: 2024-11-17
Payer: MEDICARE

## 2024-11-17 VITALS
HEART RATE: 93 BPM | RESPIRATION RATE: 19 BRPM | DIASTOLIC BLOOD PRESSURE: 85 MMHG | HEIGHT: 65 IN | OXYGEN SATURATION: 92 % | TEMPERATURE: 97.6 F | WEIGHT: 158.29 LBS | BODY MASS INDEX: 26.37 KG/M2 | SYSTOLIC BLOOD PRESSURE: 148 MMHG

## 2024-11-17 DIAGNOSIS — J44.1 ACUTE EXACERBATION OF CHRONIC OBSTRUCTIVE PULMONARY DISEASE (COPD): Primary | ICD-10-CM

## 2024-11-17 LAB
ALBUMIN SERPL-MCNC: 4 G/DL (ref 3.5–5.2)
ALBUMIN/GLOB SERPL: 1.5 G/DL
ALP SERPL-CCNC: 118 U/L (ref 39–117)
ALT SERPL W P-5'-P-CCNC: 16 U/L (ref 1–41)
ANION GAP SERPL CALCULATED.3IONS-SCNC: 10.1 MMOL/L (ref 5–15)
AST SERPL-CCNC: 22 U/L (ref 1–40)
BASOPHILS # BLD AUTO: 0.04 10*3/MM3 (ref 0–0.2)
BASOPHILS NFR BLD AUTO: 0.5 % (ref 0–1.5)
BILIRUB SERPL-MCNC: 0.6 MG/DL (ref 0–1.2)
BUN SERPL-MCNC: 9 MG/DL (ref 8–23)
BUN/CREAT SERPL: 6.9 (ref 7–25)
CALCIUM SPEC-SCNC: 8.9 MG/DL (ref 8.6–10.5)
CHLORIDE SERPL-SCNC: 106 MMOL/L (ref 98–107)
CO2 SERPL-SCNC: 24.9 MMOL/L (ref 22–29)
CREAT SERPL-MCNC: 1.31 MG/DL (ref 0.76–1.27)
DEPRECATED RDW RBC AUTO: 48.4 FL (ref 37–54)
EGFRCR SERPLBLD CKD-EPI 2021: 59.7 ML/MIN/1.73
EOSINOPHIL # BLD AUTO: 0.44 10*3/MM3 (ref 0–0.4)
EOSINOPHIL NFR BLD AUTO: 5.8 % (ref 0.3–6.2)
ERYTHROCYTE [DISTWIDTH] IN BLOOD BY AUTOMATED COUNT: 14 % (ref 12.3–15.4)
FLUAV SUBTYP SPEC NAA+PROBE: NOT DETECTED
FLUBV RNA ISLT QL NAA+PROBE: NOT DETECTED
GLOBULIN UR ELPH-MCNC: 2.6 GM/DL
GLUCOSE SERPL-MCNC: 101 MG/DL (ref 65–99)
HCT VFR BLD AUTO: 43.6 % (ref 37.5–51)
HGB BLD-MCNC: 14.2 G/DL (ref 13–17.7)
HOLD SPECIMEN: NORMAL
HOLD SPECIMEN: NORMAL
IMM GRANULOCYTES # BLD AUTO: 0.02 10*3/MM3 (ref 0–0.05)
IMM GRANULOCYTES NFR BLD AUTO: 0.3 % (ref 0–0.5)
LYMPHOCYTES # BLD AUTO: 1.07 10*3/MM3 (ref 0.7–3.1)
LYMPHOCYTES NFR BLD AUTO: 14 % (ref 19.6–45.3)
MCH RBC QN AUTO: 30.7 PG (ref 26.6–33)
MCHC RBC AUTO-ENTMCNC: 32.6 G/DL (ref 31.5–35.7)
MCV RBC AUTO: 94.2 FL (ref 79–97)
MONOCYTES # BLD AUTO: 0.36 10*3/MM3 (ref 0.1–0.9)
MONOCYTES NFR BLD AUTO: 4.7 % (ref 5–12)
NEUTROPHILS NFR BLD AUTO: 5.72 10*3/MM3 (ref 1.7–7)
NEUTROPHILS NFR BLD AUTO: 74.7 % (ref 42.7–76)
NRBC BLD AUTO-RTO: 0 /100 WBC (ref 0–0.2)
NT-PROBNP SERPL-MCNC: 233.2 PG/ML (ref 0–900)
PLATELET # BLD AUTO: 161 10*3/MM3 (ref 140–450)
PMV BLD AUTO: 11.9 FL (ref 6–12)
POTASSIUM SERPL-SCNC: 4.3 MMOL/L (ref 3.5–5.2)
PROT SERPL-MCNC: 6.6 G/DL (ref 6–8.5)
QT INTERVAL: 384 MS
QTC INTERVAL: 415 MS
RBC # BLD AUTO: 4.63 10*6/MM3 (ref 4.14–5.8)
RSV RNA NPH QL NAA+NON-PROBE: NOT DETECTED
SARS-COV-2 RNA RESP QL NAA+PROBE: NOT DETECTED
SODIUM SERPL-SCNC: 141 MMOL/L (ref 136–145)
TROPONIN T SERPL HS-MCNC: 20 NG/L
WBC NRBC COR # BLD AUTO: 7.65 10*3/MM3 (ref 3.4–10.8)
WHOLE BLOOD HOLD COAG: NORMAL
WHOLE BLOOD HOLD SPECIMEN: NORMAL

## 2024-11-17 PROCEDURE — 87637 SARSCOV2&INF A&B&RSV AMP PRB: CPT | Performed by: EMERGENCY MEDICINE

## 2024-11-17 PROCEDURE — 25010000002 METHYLPREDNISOLONE PER 125 MG: Performed by: EMERGENCY MEDICINE

## 2024-11-17 PROCEDURE — 85025 COMPLETE CBC W/AUTO DIFF WBC: CPT | Performed by: EMERGENCY MEDICINE

## 2024-11-17 PROCEDURE — 71045 X-RAY EXAM CHEST 1 VIEW: CPT

## 2024-11-17 PROCEDURE — 94799 UNLISTED PULMONARY SVC/PX: CPT

## 2024-11-17 PROCEDURE — 84484 ASSAY OF TROPONIN QUANT: CPT | Performed by: EMERGENCY MEDICINE

## 2024-11-17 PROCEDURE — 94640 AIRWAY INHALATION TREATMENT: CPT

## 2024-11-17 PROCEDURE — 93005 ELECTROCARDIOGRAM TRACING: CPT

## 2024-11-17 PROCEDURE — 96374 THER/PROPH/DIAG INJ IV PUSH: CPT

## 2024-11-17 PROCEDURE — 83880 ASSAY OF NATRIURETIC PEPTIDE: CPT | Performed by: EMERGENCY MEDICINE

## 2024-11-17 PROCEDURE — 93005 ELECTROCARDIOGRAM TRACING: CPT | Performed by: EMERGENCY MEDICINE

## 2024-11-17 PROCEDURE — 99284 EMERGENCY DEPT VISIT MOD MDM: CPT

## 2024-11-17 PROCEDURE — 80053 COMPREHEN METABOLIC PANEL: CPT | Performed by: EMERGENCY MEDICINE

## 2024-11-17 RX ORDER — IPRATROPIUM BROMIDE AND ALBUTEROL SULFATE 2.5; .5 MG/3ML; MG/3ML
6 SOLUTION RESPIRATORY (INHALATION) ONCE
Status: DISCONTINUED | OUTPATIENT
Start: 2024-11-17 | End: 2024-11-17 | Stop reason: HOSPADM

## 2024-11-17 RX ORDER — PREDNISONE 50 MG/1
50 TABLET ORAL DAILY
Qty: 5 TABLET | Refills: 0 | Status: SHIPPED | OUTPATIENT
Start: 2024-11-17

## 2024-11-17 RX ORDER — METHYLPREDNISOLONE SODIUM SUCCINATE 125 MG/2ML
80 INJECTION, POWDER, LYOPHILIZED, FOR SOLUTION INTRAMUSCULAR; INTRAVENOUS ONCE
Status: COMPLETED | OUTPATIENT
Start: 2024-11-17 | End: 2024-11-17

## 2024-11-17 RX ORDER — ALBUTEROL SULFATE 0.63 MG/3ML
1 SOLUTION RESPIRATORY (INHALATION) EVERY 6 HOURS PRN
Qty: 90 EACH | Refills: 2 | Status: SHIPPED | OUTPATIENT
Start: 2024-11-17

## 2024-11-17 RX ORDER — SODIUM CHLORIDE 0.9 % (FLUSH) 0.9 %
10 SYRINGE (ML) INJECTION AS NEEDED
Status: DISCONTINUED | OUTPATIENT
Start: 2024-11-17 | End: 2024-11-17 | Stop reason: HOSPADM

## 2024-11-17 RX ORDER — ALBUTEROL SULFATE 90 UG/1
2 INHALANT RESPIRATORY (INHALATION) ONCE
Status: DISCONTINUED | OUTPATIENT
Start: 2024-11-17 | End: 2024-11-17 | Stop reason: HOSPADM

## 2024-11-17 RX ORDER — IPRATROPIUM BROMIDE AND ALBUTEROL SULFATE 2.5; .5 MG/3ML; MG/3ML
SOLUTION RESPIRATORY (INHALATION)
Status: DISCONTINUED
Start: 2024-11-17 | End: 2024-11-17 | Stop reason: HOSPADM

## 2024-11-17 RX ORDER — DOXYCYCLINE 100 MG/1
100 CAPSULE ORAL 2 TIMES DAILY
Qty: 14 CAPSULE | Refills: 0 | Status: SHIPPED | OUTPATIENT
Start: 2024-11-17

## 2024-11-17 RX ADMIN — METHYLPREDNISOLONE SODIUM SUCCINATE 80 MG: 125 INJECTION, POWDER, FOR SOLUTION INTRAMUSCULAR; INTRAVENOUS at 11:27

## 2024-11-17 NOTE — DISCHARGE INSTRUCTIONS
All of your workup here looks normal today including your chest x-ray showed no pneumonia, your COVID and flu swabs were negative, all of your blood work looks good and even your kidney function is almost back to normal.    It sounds like you were just having a bad flareup of your COPD and need to be on breathing treatments, steroids this week, possibly even some antibiotics.    You were given steroids today so do not take your next dose of prednisone until tomorrow morning.      Return to the ER if your shortness of breath worsens.

## 2024-11-17 NOTE — ED TRIAGE NOTES
Pt arrives to ED with complaints of shortness of breath that's been going on for two weeks .. Pt has COPD and states he doesn't wear oxygen at home .. 02 96% on room air and wheezes heard when pt is breathing in triage.. pt states he has a productive cough too

## 2024-11-17 NOTE — ED PROVIDER NOTES
Time: 11:09 AM EST  Date of encounter:  2024  Independent Historian/Clinical History and Information was obtained by:   Patient and Family    History is limited by: N/A    Chief Complaint: Shortness of breath, cough, wheezing      History of Present Illness:  Patient is a 69 y.o. year old male with history of COPD not on home oxygen, who presents to the emergency department for evaluation of worsening shortness of breath and cough for the past week or 2, with associated wheezing that got worse last night and today.    No leg swelling or chest pain.    No fevers or signs of infection.    He is audibly wheezing here and had sats of 92% and placed on nasal cannula oxygen of 2 L for comfort.    He has tried using a prednisone tablet he had leftover, but just took it an hour or 2 ago prior to arrival.    He ran out of his albuterol inhaler and all of his albuterol for his nebulizer machine is long past .      Patient Care Team  Primary Care Provider: Ruel Hung MD    Past Medical History:     Allergies   Allergen Reactions    Azithromycin Unknown - High Severity    Codeine Unknown - High Severity    Penicillins Unknown - High Severity     Past Medical History:   Diagnosis Date    Acid reflux     Acid reflux disease     Anemia, unspecified     Anxiety     Arthritis     Asthma     Broken bones     Calcaneus fracture, left     Chronic bronchitis     COPD (chronic obstructive pulmonary disease)     Depression     Diverticulitis     Emphysema lung     Head injury     skull fracture     Hernia cerebri     HTN (hypertension)     Hyperlipemia     Lung disease      Past Surgical History:   Procedure Laterality Date    COLONOSCOPY  ,     ENDOSCOPY       Family History   Problem Relation Age of Onset    Other Mother         Thyroid Gland Neoplasm    Heart disease Mother     Colon cancer Mother     Diabetes Sister     Colon cancer Paternal Grandmother        Home Medications:  Prior to Admission  "medications    Medication Sig Start Date End Date Taking? Authorizing Provider   albuterol sulfate  (90 Base) MCG/ACT inhaler Inhale 2 puffs 4 (Four) Times a Day. 10/5/23   Casey Mosley MD   atorvastatin (LIPITOR) 80 MG tablet Take 1 tablet by mouth Daily. 23   Casey Mosley MD   Budeson-Glycopyrrol-Formoterol (Breztri Aerosphere) 160-9-4.8 MCG/ACT aerosol inhaler Inhale 2 puffs 2 (Two) Times a Day. 23   Casey Mosley MD   cetirizine (zyrTEC) 10 MG tablet TAKE 1 TABLET BY MOUTH DAILY. 24   Casey Mosley MD   ketotifen (ZADITOR) 0.025 % ophthalmic solution Administer 2 drops to both eyes 2 (Two) Times a Day. 22   ProviderDillon MD   meclizine (ANTIVERT) 25 MG tablet TAKE 1 TABLET BY MOUTH 3 (THREE) TIMES A DAY AS NEEDED FOR DIZZINESS. 1/3/23 11/17/24  Casey Mosley MD        Social History:   Social History     Tobacco Use    Smoking status: Former     Current packs/day: 0.00     Average packs/day: 1.5 packs/day for 40.0 years (60.0 ttl pk-yrs)     Types: Cigarettes     Start date:      Quit date:      Years since quittin.8     Passive exposure: Past    Smokeless tobacco: Never   Vaping Use    Vaping status: Never Used   Substance Use Topics    Alcohol use: Yes     Comment: Occassional    Drug use: Never         Review of Systems:  Review of Systems   I performed a 10 point review of systems which was all negative, except for the positives found in the HPI above.      Physical Exam:  /85 (BP Location: Left arm, Patient Position: Sitting)   Pulse 96   Temp 97.6 °F (36.4 °C) (Oral)   Resp 16   Ht 165.1 cm (65\")   Wt 71.8 kg (158 lb 4.6 oz)   SpO2 95%   BMI 26.34 kg/m²       Physical Exam   General: Awake alert and in mild to moderate respiratory distress with audible wheezing    HEENT: Head normocephalic atraumatic, eyes PERRLA EOMI, nose normal, oropharynx normal.    Neck: Supple full range of motion, no meningismus, no lymphadenopathy    Heart: Regular " rate and rhythm, no murmurs or rubs, 2+ radial pulses bilaterally    Lungs: Mild respiratory distress with significant wheezing present, prolonged expirations and expiratory wheezing but no crackles    Abdomen: Soft, nontender, nondistended, no rebound or guarding    Skin: Warm, dry, no rash    Musculoskeletal: Normal range of motion, no lower extremity edema    Neurologic: Oriented x3, no motor deficits no sensory deficits    Psychiatric: Mood appears stable, no psychosis          Procedures:  Procedures      Medical Decision Making:      Comorbidities that affect care:    COPD    External Notes reviewed:    None      The following orders were placed and all results were independently analyzed by me:  Orders Placed This Encounter   Procedures    COVID-19, FLU A/B, RSV PCR 1 HR TAT - Swab, Nasopharynx    XR Chest 1 View    [place] Draw    Comprehensive Metabolic Panel    BNP    Single High Sensitivity Troponin T    CBC Auto Differential    NPO Diet NPO Type: Strict NPO    Undress & Gown    Continuous Pulse Oximetry    Vital Signs    Oxygen Therapy- Nasal Cannula; Titrate 1-6 LPM Per SpO2; 90 - 95%    ECG 12 Lead ED Triage Standing Order; SOA    Insert Peripheral IV    CBC & Differential    Green Top (Gel)    Lavender Top    Gold Top - SST    Light Blue Top       Medications Given in the Emergency Department:  Medications   sodium chloride 0.9 % flush 10 mL (has no administration in time range)   ipratropium-albuterol (DUO-NEB) nebulizer solution 6 mL (6 mL Nebulization Not Given 11/17/24 1130)   ipratropium-albuterol (DUO-NEB) 0.5-2.5 mg/3 ml nebulizer solution  - ADS Override Pull (has no administration in time range)   albuterol sulfate HFA (PROVENTIL HFA;VENTOLIN HFA;PROAIR HFA) inhaler 2 puff (has no administration in time range)   methylPREDNISolone sodium succinate (SOLU-Medrol) injection 80 mg (80 mg Intravenous Given 11/17/24 1127)        ED Course:    ED Course as of 11/17/24 1259   Sun Nov 17, 2024   1111  EKG: I interpreted his twelve-lead EKG as sinus rhythm at 70 bpm with 1 PVC noted, normal P waves, normal QRS, normal ST segments and T waves; no acute ischemia.  Normal intervals. [VS]      ED Course User Index  [VS] Garrett Hill MD       Labs:    Lab Results (last 24 hours)       Procedure Component Value Units Date/Time    CBC & Differential [[id number]]  (Abnormal) Collected: 11/17/24 1047    Specimen: Blood Updated: 11/17/24 1056    Narrative:      The following orders were created for panel order CBC & Differential.  Procedure                               Abnormality         Status                     ---------                               -----------         ------                     CBC Auto Differential[[id number]]        Abnormal            Final result                 Please view results for these tests on the individual orders.    Comprehensive Metabolic Panel [[id number]]  (Abnormal) Collected: 11/17/24 1047    Specimen: Blood Updated: 11/17/24 1116     Glucose 101 mg/dL      BUN 9 mg/dL      Creatinine 1.31 mg/dL      Sodium 141 mmol/L      Potassium 4.3 mmol/L      Chloride 106 mmol/L      CO2 24.9 mmol/L      Calcium 8.9 mg/dL      Total Protein 6.6 g/dL      Albumin 4.0 g/dL      ALT (SGPT) 16 U/L      AST (SGOT) 22 U/L      Alkaline Phosphatase 118 U/L      Total Bilirubin 0.6 mg/dL      Globulin 2.6 gm/dL      A/G Ratio 1.5 g/dL      BUN/Creatinine Ratio 6.9     Anion Gap 10.1 mmol/L      eGFR 59.7 mL/min/1.73     Narrative:      GFR Normal >60  Chronic Kidney Disease <60  Kidney Failure <15      BNP [[id number]]  (Normal) Collected: 11/17/24 1047    Specimen: Blood Updated: 11/17/24 1114     proBNP 233.2 pg/mL     Narrative:      This assay is used as an aid in the diagnosis of individuals suspected of having heart failure. It can be used as an aid in the diagnosis of acute decompensated heart failure (ADHF) in patients presenting with signs and symptoms of ADHF to the emergency department  (ED). In addition, NT-proBNP of <300 pg/mL indicates ADHF is not likely.    Age Range Result Interpretation  NT-proBNP Concentration (pg/mL:      <50             Positive            >450                   Gray                 300-450                    Negative             <300    50-75           Positive            >900                  Gray                300-900                  Negative            <300      >75             Positive            >1800                  Gray                300-1800                  Negative            <300    Single High Sensitivity Troponin T [[id number]]  (Normal) Collected: 11/17/24 1047    Specimen: Blood Updated: 11/17/24 1116     HS Troponin T 20 ng/L     Narrative:      High Sensitive Troponin T Reference Range:  <14.0 ng/L- Negative Female for AMI  <22.0 ng/L- Negative Male for AMI  >=14 - Abnormal Female indicating possible myocardial injury.  >=22 - Abnormal Male indicating possible myocardial injury.   Clinicians would have to utilize clinical acumen, EKG, Troponin, and serial changes to determine if it is an Acute Myocardial Infarction or myocardial injury due to an underlying chronic condition.         CBC Auto Differential [[id number]]  (Abnormal) Collected: 11/17/24 1047    Specimen: Blood Updated: 11/17/24 1056     WBC 7.65 10*3/mm3      RBC 4.63 10*6/mm3      Hemoglobin 14.2 g/dL      Hematocrit 43.6 %      MCV 94.2 fL      MCH 30.7 pg      MCHC 32.6 g/dL      RDW 14.0 %      RDW-SD 48.4 fl      MPV 11.9 fL      Platelets 161 10*3/mm3      Neutrophil % 74.7 %      Lymphocyte % 14.0 %      Monocyte % 4.7 %      Eosinophil % 5.8 %      Basophil % 0.5 %      Immature Grans % 0.3 %      Neutrophils, Absolute 5.72 10*3/mm3      Lymphocytes, Absolute 1.07 10*3/mm3      Monocytes, Absolute 0.36 10*3/mm3      Eosinophils, Absolute 0.44 10*3/mm3      Basophils, Absolute 0.04 10*3/mm3      Immature Grans, Absolute 0.02 10*3/mm3      nRBC 0.0 /100 WBC     COVID-19, FLU A/B, RSV  PCR 1 HR TAT - Swab, Nasopharynx [[id number]]  (Normal) Collected: 11/17/24 1101    Specimen: Swab from Nasopharynx Updated: 11/17/24 1149     COVID19 Not Detected     Influenza A PCR Not Detected     Influenza B PCR Not Detected     RSV, PCR Not Detected    Narrative:      Fact sheet for providers: https://www.fda.gov/media/686310/download    Fact sheet for patients: https://www.fda.gov/media/719256/download    Test performed by PCR.             Imaging:    XR Chest 1 View    Result Date: 11/17/2024  XR CHEST 1 VW Date of Exam: 11/17/2024 10:56 AM EST Indication: SOA Triage Protocol Comparison: Chest radiograph 5/25/2024. Findings: Cardiomediastinal silhouette is within normal limits. No focal consolidation or overt pulmonary edema. No pleural effusion or pneumothorax. Osseous structures are unremarkable.     Impression: No evidence of acute cardiopulmonary disease. Electronically Signed: Oliver Jose MD  11/17/2024 11:23 AM EST  Workstation ID: [id number]       Differential Diagnosis and Discussion:    Cough: Differential diagnosis includes but is not limited to pneumonia, acute bronchitis, upper respiratory infection, ACE inhibitor use, allergic reaction, epiglottitis, seasonal allergies, chemical irritants, exercise-induced asthma, viral syndrome.  Dyspnea: Differential diagnosis includes but is not limited to metabolic acidosis, neurological disorders, psychogenic, asthma, pneumothorax, upper airway obstruction, COPD, pneumonia, noncardiogenic pulmonary edema, interstitial lung disease, anemia, congestive heart failure, and pulmonary embolism    All labs were reviewed and interpreted by me.  All X-rays impressions were independently interpreted by me.    MDM           This patient is a 69-year-old male with COPD who quit smoking years ago, now presents with shortness of breath wheezing and cough.    Clinically he sounds very wheezy with prolonged expirations most likely indicative of COPD exacerbation.    I am  giving him a medium dose of IV Solu-Medrol here along with a couple nebulized DuoNeb breathing treatments, as he already took a leftover prednisone pill earlier this morning.    We will also get chest x-ray to rule out any lobar infiltrate concerning for pneumonia or effusion or edema.    On my review of his chest x-ray looks essentially normal and I do not see any effusions or edema or infiltrates, and so far lab work is coming back reassuring including normal white blood cell count of 7.6.    I will reassess following steroids and breathing treatments here to see if he needs to be admitted or can be managed on prednisone burst and otherwise we will refill his  albuterol.    Patient states he feels significantly improved and we had him ambulate and sats stayed 93 to 94% on room air so I think it be discharged home with the above treatment plan and return precautions given.                Patient Care Considerations:          Consultants/Shared Management Plan:        Social Determinants of Health:    Patient has presented with family members who are responsible, reliable and will ensure follow up care.      Disposition and Care Coordination:    Discharged: I considered escalation of care by admitting this patient to the hospital, however patient looking improved after breathing treatments and steroids and has no oxygen requirement I think he be managed as an outpatient on prednisone burst with breathing treatments.    I have explained the patient´s condition, diagnoses and treatment plan based on the information available to me at this time. I have answered questions and addressed any concerns. The patient has a good  understanding of the patient´s diagnosis, condition, and treatment plan as can be expected at this point. The vital signs have been stable. The patient´s condition is stable and appropriate for discharge from the emergency department.      The patient will pursue further outpatient evaluation  with the primary care physician or other designated or consulting physician as outlined in the discharge instructions. They are agreeable to this plan of care and follow-up instructions have been explained in detail. The patient has received these instructions in written format and has expressed an understanding of the discharge instructions. The patient is aware that any significant change in condition or worsening of symptoms should prompt an immediate return to this or the closest emergency department or call to 911.  I have explained discharge medications and the need for follow up with the patient/caretakers. This was also printed in the discharge instructions. Patient was discharged with the following medications and follow up:      Medication List        New Prescriptions      albuterol 0.63 MG/3ML nebulizer solution  Commonly known as: ACCUNEB  Take 3 mL by nebulization Every 6 (Six) Hours As Needed for Wheezing or Shortness of Air.  Replaces: albuterol sulfate  (90 Base) MCG/ACT inhaler     doxycycline 100 MG capsule  Commonly known as: MONODOX  Take 1 capsule by mouth 2 (Two) Times a Day.     predniSONE 50 MG tablet  Commonly known as: DELTASONE  Take 1 tablet by mouth Daily.            Stop      albuterol sulfate  (90 Base) MCG/ACT inhaler  Commonly known as: PROVENTIL HFA;VENTOLIN HFA;PROAIR HFA  Replaced by: albuterol 0.63 MG/3ML nebulizer solution               Where to Get Your Medications        These medications were sent to [facility] [id number]  [place] [address] [facility] [address] - [phone]  - [phone] [facility], [facility] [place] [address]      Phone: [phone]   albuterol 0.63 MG/3ML nebulizer solution  doxycycline 100 MG capsule  predniSONE 50 MG tablet      Ruel Hung MD  [address] [id number] [phone]    Call in 2 days  As needed, If symptoms worsen, for a follow-up appointment       Final  diagnoses:   Acute exacerbation of chronic obstructive pulmonary disease (COPD)        ED Disposition       ED Disposition   Discharge    Condition   Stable    Comment   --               This medical record created using voice recognition software.             Garrett Hill MD  11/17/24 1300

## 2024-12-01 LAB
QT INTERVAL: 384 MS
QTC INTERVAL: 415 MS

## 2025-01-01 ENCOUNTER — APPOINTMENT (OUTPATIENT)
Dept: GENERAL RADIOLOGY | Facility: HOSPITAL | Age: 70
End: 2025-01-01
Payer: MEDICARE

## 2025-01-01 ENCOUNTER — HOSPITAL ENCOUNTER (EMERGENCY)
Facility: HOSPITAL | Age: 70
Discharge: HOME | End: 2025-01-01
Attending: EMERGENCY MEDICINE
Payer: MEDICARE

## 2025-01-01 ENCOUNTER — APPOINTMENT (OUTPATIENT)
Dept: CT IMAGING | Facility: HOSPITAL | Age: 70
End: 2025-01-01
Payer: MEDICARE

## 2025-01-01 VITALS
OXYGEN SATURATION: 94 % | TEMPERATURE: 98.5 F | DIASTOLIC BLOOD PRESSURE: 66 MMHG | SYSTOLIC BLOOD PRESSURE: 174 MMHG | HEIGHT: 65 IN | BODY MASS INDEX: 26.48 KG/M2 | HEART RATE: 93 BPM | WEIGHT: 158.95 LBS | RESPIRATION RATE: 20 BRPM

## 2025-01-01 DIAGNOSIS — R10.31 RLQ ABDOMINAL PAIN: Primary | ICD-10-CM

## 2025-01-01 DIAGNOSIS — D72.829 LEUKOCYTOSIS, UNSPECIFIED TYPE: ICD-10-CM

## 2025-01-01 LAB
ALBUMIN SERPL-MCNC: 4.2 G/DL (ref 3.5–5.2)
ALBUMIN/GLOB SERPL: 1.6 G/DL
ALP SERPL-CCNC: 119 U/L (ref 39–117)
ALT SERPL W P-5'-P-CCNC: 13 U/L (ref 1–41)
ANION GAP SERPL CALCULATED.3IONS-SCNC: 12.1 MMOL/L (ref 5–15)
AST SERPL-CCNC: 20 U/L (ref 1–40)
BASOPHILS # BLD AUTO: 0.06 10*3/MM3 (ref 0–0.2)
BASOPHILS NFR BLD AUTO: 0.2 % (ref 0–1.5)
BILIRUB SERPL-MCNC: 0.9 MG/DL (ref 0–1.2)
BILIRUB UR QL STRIP: NEGATIVE
BUN SERPL-MCNC: 15 MG/DL (ref 8–23)
BUN/CREAT SERPL: 9.9 (ref 7–25)
CALCIUM SPEC-SCNC: 8.9 MG/DL (ref 8.6–10.5)
CHLORIDE SERPL-SCNC: 108 MMOL/L (ref 98–107)
CLARITY UR: CLEAR
CO2 SERPL-SCNC: 21.9 MMOL/L (ref 22–29)
COLOR UR: YELLOW
CREAT SERPL-MCNC: 1.51 MG/DL (ref 0.76–1.27)
D-LACTATE SERPL-SCNC: 1.1 MMOL/L (ref 0.5–2)
D-LACTATE SERPL-SCNC: 2.3 MMOL/L (ref 0.5–2)
DEPRECATED RDW RBC AUTO: 45.4 FL (ref 37–54)
EGFRCR SERPLBLD CKD-EPI 2021: 50.3 ML/MIN/1.73
EOSINOPHIL # BLD AUTO: 0.05 10*3/MM3 (ref 0–0.4)
EOSINOPHIL NFR BLD AUTO: 0.2 % (ref 0.3–6.2)
ERYTHROCYTE [DISTWIDTH] IN BLOOD BY AUTOMATED COUNT: 12.7 % (ref 12.3–15.4)
GLOBULIN UR ELPH-MCNC: 2.7 GM/DL
GLUCOSE SERPL-MCNC: 146 MG/DL (ref 65–99)
GLUCOSE UR STRIP-MCNC: NEGATIVE MG/DL
HCT VFR BLD AUTO: 41.2 % (ref 37.5–51)
HGB BLD-MCNC: 13.5 G/DL (ref 13–17.7)
HGB UR QL STRIP.AUTO: NEGATIVE
HOLD SPECIMEN: NORMAL
HOLD SPECIMEN: NORMAL
IMM GRANULOCYTES # BLD AUTO: 0.09 10*3/MM3 (ref 0–0.05)
IMM GRANULOCYTES NFR BLD AUTO: 0.4 % (ref 0–0.5)
KETONES UR QL STRIP: NEGATIVE
LEUKOCYTE ESTERASE UR QL STRIP.AUTO: NEGATIVE
LIPASE SERPL-CCNC: 19 U/L (ref 13–60)
LYMPHOCYTES # BLD AUTO: 1.42 10*3/MM3 (ref 0.7–3.1)
LYMPHOCYTES NFR BLD AUTO: 5.8 % (ref 19.6–45.3)
MCH RBC QN AUTO: 31.8 PG (ref 26.6–33)
MCHC RBC AUTO-ENTMCNC: 32.8 G/DL (ref 31.5–35.7)
MCV RBC AUTO: 97.2 FL (ref 79–97)
MONOCYTES # BLD AUTO: 1.39 10*3/MM3 (ref 0.1–0.9)
MONOCYTES NFR BLD AUTO: 5.7 % (ref 5–12)
NEUTROPHILS NFR BLD AUTO: 21.41 10*3/MM3 (ref 1.7–7)
NEUTROPHILS NFR BLD AUTO: 87.7 % (ref 42.7–76)
NITRITE UR QL STRIP: NEGATIVE
NRBC BLD AUTO-RTO: 0 /100 WBC (ref 0–0.2)
PH UR STRIP.AUTO: 6 [PH] (ref 5–8)
PLATELET # BLD AUTO: 207 10*3/MM3 (ref 140–450)
PMV BLD AUTO: 11.3 FL (ref 6–12)
POTASSIUM SERPL-SCNC: 4.5 MMOL/L (ref 3.5–5.2)
PROT SERPL-MCNC: 6.9 G/DL (ref 6–8.5)
PROT UR QL STRIP: ABNORMAL
RBC # BLD AUTO: 4.24 10*6/MM3 (ref 4.14–5.8)
SODIUM SERPL-SCNC: 142 MMOL/L (ref 136–145)
SP GR UR STRIP: >=1.03 (ref 1–1.03)
UROBILINOGEN UR QL STRIP: ABNORMAL
WBC NRBC COR # BLD AUTO: 24.42 10*3/MM3 (ref 3.4–10.8)
WHOLE BLOOD HOLD COAG: NORMAL
WHOLE BLOOD HOLD SPECIMEN: NORMAL

## 2025-01-01 PROCEDURE — 71045 X-RAY EXAM CHEST 1 VIEW: CPT

## 2025-01-01 PROCEDURE — 83690 ASSAY OF LIPASE: CPT | Performed by: EMERGENCY MEDICINE

## 2025-01-01 PROCEDURE — 99285 EMERGENCY DEPT VISIT HI MDM: CPT

## 2025-01-01 PROCEDURE — 87040 BLOOD CULTURE FOR BACTERIA: CPT | Performed by: EMERGENCY MEDICINE

## 2025-01-01 PROCEDURE — 25510000001 IOPAMIDOL PER 1 ML: Performed by: EMERGENCY MEDICINE

## 2025-01-01 PROCEDURE — 81003 URINALYSIS AUTO W/O SCOPE: CPT | Performed by: EMERGENCY MEDICINE

## 2025-01-01 PROCEDURE — 96374 THER/PROPH/DIAG INJ IV PUSH: CPT

## 2025-01-01 PROCEDURE — 96361 HYDRATE IV INFUSION ADD-ON: CPT

## 2025-01-01 PROCEDURE — 85025 COMPLETE CBC W/AUTO DIFF WBC: CPT | Performed by: EMERGENCY MEDICINE

## 2025-01-01 PROCEDURE — 74177 CT ABD & PELVIS W/CONTRAST: CPT

## 2025-01-01 PROCEDURE — 25810000003 SODIUM CHLORIDE 0.9 % SOLUTION: Performed by: EMERGENCY MEDICINE

## 2025-01-01 PROCEDURE — 36415 COLL VENOUS BLD VENIPUNCTURE: CPT

## 2025-01-01 PROCEDURE — 25010000002 HYDROMORPHONE 1 MG/ML SOLUTION: Performed by: EMERGENCY MEDICINE

## 2025-01-01 PROCEDURE — 83605 ASSAY OF LACTIC ACID: CPT | Performed by: EMERGENCY MEDICINE

## 2025-01-01 PROCEDURE — 80053 COMPREHEN METABOLIC PANEL: CPT | Performed by: EMERGENCY MEDICINE

## 2025-01-01 RX ORDER — IOPAMIDOL 755 MG/ML
100 INJECTION, SOLUTION INTRAVASCULAR
Status: COMPLETED | OUTPATIENT
Start: 2025-01-01 | End: 2025-01-01

## 2025-01-01 RX ORDER — SODIUM CHLORIDE 0.9 % (FLUSH) 0.9 %
10 SYRINGE (ML) INJECTION AS NEEDED
Status: DISCONTINUED | OUTPATIENT
Start: 2025-01-01 | End: 2025-01-01 | Stop reason: HOSPADM

## 2025-01-01 RX ORDER — METRONIDAZOLE 500 MG/100ML
500 INJECTION, SOLUTION INTRAVENOUS ONCE
Status: DISCONTINUED | OUTPATIENT
Start: 2025-01-01 | End: 2025-01-01

## 2025-01-01 RX ADMIN — SODIUM CHLORIDE 2163 ML: 9 INJECTION, SOLUTION INTRAVENOUS at 18:23

## 2025-01-01 RX ADMIN — IOPAMIDOL 100 ML: 755 INJECTION, SOLUTION INTRAVENOUS at 18:05

## 2025-01-01 RX ADMIN — HYDROMORPHONE HYDROCHLORIDE 1 MG: 1 INJECTION, SOLUTION INTRAMUSCULAR; INTRAVENOUS; SUBCUTANEOUS at 18:23

## 2025-01-01 NOTE — ED PROVIDER NOTES
Time: 5:30 PM EST  Date of encounter:  1/1/2025  Independent Historian/Clinical History and Information was obtained by:   Patient    History is limited by: N/A    Chief Complaint   Patient presents with    Abdominal Pain         History of Present Illness:  Patient is a 69 y.o. year old male who presents to the emergency department for evaluation of RLQ pain that started last night.  Denies worsening pain with movement.  Patient states it started and then he went to a party and had a couple cocktails and about 8 beers.  He states he then ate and then had 1 episode of vomiting.  He is not sure if the vomiting was due to the pain or from the food or alcohol.  He denies fevers, nausea, vomiting, dysuria, hematuria, flank pain and diarrhea.  Denies prior abdominal surgeries.    Patient Care Team  Primary Care Provider: Ruel Hung MD    Past Medical History:     Allergies   Allergen Reactions    Azithromycin Unknown - High Severity    Codeine Unknown - High Severity    Penicillins Unknown - High Severity     Past Medical History:   Diagnosis Date    Acid reflux     Acid reflux disease     Anemia, unspecified     Anxiety     Arthritis     Asthma     Broken bones     Calcaneus fracture, left     Chronic bronchitis     COPD (chronic obstructive pulmonary disease)     Depression     Diverticulitis     Emphysema lung     Head injury     skull fracture     Hernia cerebri     HTN (hypertension)     Hyperlipemia     Lung disease      Past Surgical History:   Procedure Laterality Date    COLONOSCOPY  2014, 2019    ENDOSCOPY       Family History   Problem Relation Age of Onset    Other Mother         Thyroid Gland Neoplasm    Heart disease Mother     Colon cancer Mother     Diabetes Sister     Colon cancer Paternal Grandmother        Home Medications:  Prior to Admission medications    Medication Sig Start Date End Date Taking? Authorizing Provider   albuterol (ACCUNEB) 0.63 MG/3ML nebulizer solution Take 3 mL by  nebulization Every 6 (Six) Hours As Needed for Wheezing or Shortness of Air. 24   Garrett Hill MD   atorvastatin (LIPITOR) 80 MG tablet Take 1 tablet by mouth Daily. 23   Casey Mosley MD   Budeson-Glycopyrrol-Formoterol (Breztri Aerosphere) 160-9-4.8 MCG/ACT aerosol inhaler Inhale 2 puffs 2 (Two) Times a Day. 23   Casey Mosley MD   cetirizine (zyrTEC) 10 MG tablet TAKE 1 TABLET BY MOUTH DAILY. 24   Casey Mosley MD   doxycycline (MONODOX) 100 MG capsule Take 1 capsule by mouth 2 (Two) Times a Day. 24   Garrett Hill MD   ketotifen (ZADITOR) 0.025 % ophthalmic solution Administer 2 drops to both eyes 2 (Two) Times a Day. 22   ProviderDillon MD   predniSONE (DELTASONE) 50 MG tablet Take 1 tablet by mouth Daily. 24   Garrett Hill MD        Social History:   Social History     Tobacco Use    Smoking status: Former     Current packs/day: 0.00     Average packs/day: 1.5 packs/day for 40.0 years (60.0 ttl pk-yrs)     Types: Cigarettes     Start date:      Quit date:      Years since quittin.0     Passive exposure: Past    Smokeless tobacco: Never   Vaping Use    Vaping status: Never Used   Substance Use Topics    Alcohol use: Yes     Comment: Occassional    Drug use: Never         Review of Systems:  Review of Systems   Constitutional: Negative.  Negative for fever.   HENT: Negative.     Eyes: Negative.    Respiratory: Negative.     Cardiovascular: Negative.    Gastrointestinal: Negative.  Positive for abdominal pain and vomiting (1 episode). Negative for diarrhea and nausea.   Endocrine: Negative.    Genitourinary: Negative.  Negative for dysuria, flank pain and hematuria.   Musculoskeletal: Negative.    Skin: Negative.    Allergic/Immunologic: Negative.    Neurological: Negative.    Hematological: Negative.    Psychiatric/Behavioral: Negative.          Physical Exam:  /66 (BP Location: Left arm, Patient Position: Sitting)   Pulse 93   Temp 98.5  "°F (36.9 °C) (Oral)   Resp 20   Ht 165.1 cm (65\")   Wt 72.1 kg (158 lb 15.2 oz)   SpO2 94%   BMI 26.45 kg/m²         Physical Exam  Vitals and nursing note reviewed.   Constitutional:       Appearance: Normal appearance.   HENT:      Head: Normocephalic and atraumatic.      Nose: Nose normal.      Mouth/Throat:      Mouth: Mucous membranes are moist.   Eyes:      Extraocular Movements: Extraocular movements intact.      Conjunctiva/sclera: Conjunctivae normal.      Pupils: Pupils are equal, round, and reactive to light.   Cardiovascular:      Rate and Rhythm: Normal rate and regular rhythm.      Heart sounds: Normal heart sounds.   Pulmonary:      Effort: Pulmonary effort is normal.      Breath sounds: Normal breath sounds.   Abdominal:      General: Abdomen is flat. Bowel sounds are normal.      Palpations: Abdomen is soft.      Tenderness: There is no abdominal tenderness. There is no right CVA tenderness, left CVA tenderness, guarding or rebound. Negative signs include Rovsing's sign, psoas sign and obturator sign.   Musculoskeletal:         General: Normal range of motion.      Cervical back: Normal range of motion and neck supple.   Skin:     General: Skin is warm and dry.   Neurological:      General: No focal deficit present.      Mental Status: He is alert and oriented to person, place, and time.   Psychiatric:         Mood and Affect: Mood normal.         Behavior: Behavior normal.                        Medical Decision Making:      Comorbidities that affect care:    Anxiety, anemia, Asthma, COPD, Hypertension    External Notes reviewed:    Previous Clinic Note: Office visit with PCP 11/8/2023      The following orders were placed and all results were independently analyzed by me:  Orders Placed This Encounter   Procedures    Blood Culture - Blood,    Blood Culture - Blood,    CT Abdomen Pelvis With Contrast    XR Chest 1 View    [place] Draw    Comprehensive Metabolic Panel    Lipase    Urinalysis " With Microscopic If Indicated (No Culture) - Urine, Clean Catch    Lactic Acid, Plasma    CBC Auto Differential    STAT Lactic Acid, Reflex    NPO Diet NPO Type: Strict NPO    Undress & Gown    Insert Peripheral IV    CBC & Differential    Green Top (Gel)    Lavender Top    Gold Top - SST    Light Blue Top       Medications Given in the Emergency Department:  Medications   sodium chloride 0.9 % flush 10 mL (has no administration in time range)   sodium chloride 0.9 % bolus 2,163 mL (0 mL Intravenous Stopped 1/1/25 2014)   HYDROmorphone (DILAUDID) injection 1 mg (1 mg Intravenous Given 1/1/25 1823)   iopamidol (ISOVUE-370) 76 % injection 100 mL (100 mL Intravenous Given 1/1/25 1805)        ED Course:    The patient was initially evaluated in the triage area where orders were placed. The patient was later dispositioned by Libby Petersen PA-C.      The patient was advised to stay for completion of workup which includes but is not limited to communication of labs and radiological results, reassessment and plan. The patient was advised that leaving prior to disposition by a provider could result in critical findings that are not communicated to the patient.     ED Course as of 01/01/25 2021 Wed Jan 01, 2025 1819 CT scan negative, IV antibiotics canceled no infectious process or bacterial process identified.    Patient meets SIRS criteria and has sepsis bolus ordered. [AJ]   2018 Chest x-ray negative.  Blood cultures completed due to patient's elevated WBC.  Discussed with the patient that if cultures are positive we will give him a call. [AJ]      ED Course User Index  [AJ] Libby Petersen PA-C       Labs:    Lab Results (last 24 hours)       Procedure Component Value Units Date/Time    CBC & Differential [[id number]]  (Abnormal) Collected: 01/01/25 1631    Specimen: Blood from Arm, Left Updated: 01/01/25 1639    Narrative:      The following orders were created for panel order CBC & Differential.  Procedure                                Abnormality         Status                     ---------                               -----------         ------                     CBC Auto Differential[[id number]]        Abnormal            Final result                 Please view results for these tests on the individual orders.    Comprehensive Metabolic Panel [[id number]]  (Abnormal) Collected: 01/01/25 1631    Specimen: Blood from Arm, Left Updated: 01/01/25 1658     Glucose 146 mg/dL      BUN 15 mg/dL      Creatinine 1.51 mg/dL      Sodium 142 mmol/L      Potassium 4.5 mmol/L      Chloride 108 mmol/L      CO2 21.9 mmol/L      Calcium 8.9 mg/dL      Total Protein 6.9 g/dL      Albumin 4.2 g/dL      ALT (SGPT) 13 U/L      AST (SGOT) 20 U/L      Alkaline Phosphatase 119 U/L      Total Bilirubin 0.9 mg/dL      Globulin 2.7 gm/dL      A/G Ratio 1.6 g/dL      BUN/Creatinine Ratio 9.9     Anion Gap 12.1 mmol/L      eGFR 50.3 mL/min/1.73     Narrative:      GFR Categories in Chronic Kidney Disease (CKD)      GFR Category          GFR (mL/min/1.73)    Interpretation  G1                     90 or greater         Normal or high (1)  G2                      60-89                Mild decrease (1)  G3a                   45-59                Mild to moderate decrease  G3b                   30-44                Moderate to severe decrease  G4                    15-29                Severe decrease  G5                    14 or less           Kidney failure          (1)In the absence of evidence of kidney disease, neither GFR category G1 or G2 fulfill the criteria for CKD.    eGFR calculation 2021 CKD-EPI creatinine equation, which does not include race as a factor    Lipase [[id number]]  (Normal) Collected: 01/01/25 1631    Specimen: Blood from Arm, Left Updated: 01/01/25 1658     Lipase 19 U/L     Lactic Acid, Plasma [[id number]]  (Abnormal) Collected: 01/01/25 1631    Specimen: Blood from Arm, Left Updated: 01/01/25 1705     Lactate 2.3 mmol/L      CBC Auto Differential [[id number]]  (Abnormal) Collected: 01/01/25 1631    Specimen: Blood from Arm, Left Updated: 01/01/25 1639     WBC 24.42 10*3/mm3      RBC 4.24 10*6/mm3      Hemoglobin 13.5 g/dL      Hematocrit 41.2 %      MCV 97.2 fL      MCH 31.8 pg      MCHC 32.8 g/dL      RDW 12.7 %      RDW-SD 45.4 fl      MPV 11.3 fL      Platelets 207 10*3/mm3      Neutrophil % 87.7 %      Lymphocyte % 5.8 %      Monocyte % 5.7 %      Eosinophil % 0.2 %      Basophil % 0.2 %      Immature Grans % 0.4 %      Neutrophils, Absolute 21.41 10*3/mm3      Lymphocytes, Absolute 1.42 10*3/mm3      Monocytes, Absolute 1.39 10*3/mm3      Eosinophils, Absolute 0.05 10*3/mm3      Basophils, Absolute 0.06 10*3/mm3      Immature Grans, Absolute 0.09 10*3/mm3      nRBC 0.0 /100 WBC     Urinalysis With Microscopic If Indicated (No Culture) - Urine, Clean Catch [[id number]]  (Abnormal) Collected: 01/01/25 1644    Specimen: Urine, Clean Catch Updated: 01/01/25 1650     Color, UA Yellow     Appearance, UA Clear     pH, UA 6.0     Specific Gravity, UA >=1.030     Glucose, UA Negative     Ketones, UA Negative     Bilirubin, UA Negative     Blood, UA Negative     Protein, UA Trace     Leuk Esterase, UA Negative     Nitrite, UA Negative     Urobilinogen, UA 0.2 E.U./dL    Narrative:      Urine microscopic not indicated.    Blood Culture - Blood, Arm, Left [[id number]] Collected: 01/01/25 1747    Specimen: Blood from Arm, Left Updated: 01/01/25 1756    Blood Culture - Blood, Arm, Right [[id number]] Collected: 01/01/25 1747    Specimen: Blood from Arm, Right Updated: 01/01/25 1756    STAT Lactic Acid, Reflex [[id number]]  (Normal) Collected: 01/01/25 1929    Specimen: Blood from Hand, Left Updated: 01/01/25 1958     Lactate 1.1 mmol/L              Imaging:    XR Chest 1 View    Result Date: 1/1/2025  XR CHEST 1 VW-  Date of Exam: 1/1/2025 7:37 PM  Indication: Leukocytosis  Comparison: 11/17/2024.  FINDINGS: A single AP (or PA) upright portable  chest radiograph was performed. No cardiac enlargement is seen. No acute infiltrate is appreciated. No pleural effusion or pneumothorax is identified. The thoracic aorta is atherosclerotic. Chronic calcified granulomatous disease involves the chest. No significant interval change is seen since the prior study (or studies).       No acute infiltrate is appreciated.    Portions of this note were completed with a voice recognition program.  Electronically Signed By-Jayjay Varner MD On:1/1/2025 8:00 PM      CT Abdomen Pelvis With Contrast    Result Date: 1/1/2025  CT ABDOMEN PELVIS W CONTRAST Date of Exam: 1/1/2025 5:53 PM EST Indication: RLQ pain. Comparison: July 31, 2023 Technique: Axial CT images were obtained of the abdomen and pelvis after the uneventful intravenous administration of iodinated contrast. Reconstructed coronal and sagittal images were also obtained. Automated exposure control and iterative construction methods were used. Findings: Within the lung bases mild linear scarring within the right lower lobe. The liver, gallbladder, adrenal glands, kidneys, spleen, and pancreas are unremarkable. The stomach appears normal. The small bowel appears normal in caliber and configuration. There is sigmoid diverticulosis. The appendix appears normal. There is no ascites or loculated collection. No abnormally enlarged lymph nodes are identified. The rectum, prostate, and urinary bladder are unremarkable. No aggressive osseous lesions are identified.     Impression: 1.No acute process identified within abdomen/pelvis. 2.Sigmoid diverticulosis. Electronically Signed: Gregg Hunt MD  1/1/2025 6:14 PM EST  Workstation ID: [id number]       Differential Diagnosis and Discussion:      Abdominal Pain: Based on the patient's signs and symptoms, I considered abdominal aortic aneurysm, small bowel obstruction, pancreatitis, acute cholecystitis, acute appendecitis, peptic ulcer disease, gastritis, colitis, endocrine  disorders, irritable bowel syndrome and other differential diagnosis an etiology of the patient's abdominal pain.    PROCEDURES:    Labs were collected in the emergency department and all labs were reviewed and interpreted by me.  CT scan was performed in the emergency department and the CT scan radiology impression was interpreted by me.    No orders to display        Procedures    MDM                   Patient Care Considerations:    SEPSIS IS NOT PRESENT IN THE EMERGENCY DEPARTMENT: Patient meets SIRS criteria in the emergency department however the patient does not have a known source of bacterial infection to confirm the diagnosis of sepsis.   CONSULT: I considered consulting general surgery, however CT negative ANTIBIOTICS: I considered prescribing antibiotics as an outpatient however no bacterial focus of infection was found.      Consultants/Shared Management Plan:    None    Social Determinants of Health:    Patient is independent, reliable, and has access to care.       Disposition and Care Coordination:    Discharged: I considered escalation of care by admitting this patient to the hospital, however patient CT and chest x-ray negative.  Patient be SIRS criteria however no source of infection and patient states he is no longer in pain.  He is in no acute distress and does not look ill.    I have explained the patient´s condition, diagnoses and treatment plan based on the information available to me at this time. I have answered questions and addressed any concerns. The patient has a good  understanding of the patient´s diagnosis, condition, and treatment plan as can be expected at this point. The vital signs have been stable. The patient´s condition is stable and appropriate for discharge from the emergency department.      The patient will pursue further outpatient evaluation with the primary care physician or other designated or consulting physician as outlined in the discharge instructions. They are  agreeable to this plan of care and follow-up instructions have been explained in detail. The patient has received these instructions in written format and has expressed an understanding of the discharge instructions. The patient is aware that any significant change in condition or worsening of symptoms should prompt an immediate return to this or the closest emergency department or call to 911.  I have explained discharge medications and the need for follow up with the patient/caretakers. This was also printed in the discharge instructions. Patient was discharged with the following medications and follow up:      Medication List      No changes were made to your prescriptions during this visit.      Ruel Hung MD  [address] [id number]  [phone]    In 2 days         Final diagnoses:   RLQ abdominal pain   Leukocytosis, unspecified type        ED Disposition       ED Disposition   Discharge    Condition   Stable    Comment   --               This medical record created using voice recognition software.             Libby Petersen PA-C  01/01/25 2022

## 2025-01-02 NOTE — DISCHARGE INSTRUCTIONS
Your lab work shows that your white count is elevated.  We have drawn blood cultures, if positive we will call you.  Your CT scan was negative for kidney stones and appendicitis and no other acute findings.  Chest x-ray negative for underlying pneumonia.    Please follow-up with your PCP in 1 to 2 days for repeat labs

## 2025-01-06 LAB
BACTERIA SPEC AEROBE CULT: NORMAL
BACTERIA SPEC AEROBE CULT: NORMAL

## 2025-01-25 ENCOUNTER — APPOINTMENT (OUTPATIENT)
Dept: GENERAL RADIOLOGY | Facility: HOSPITAL | Age: 70
DRG: 191 | End: 2025-01-25
Payer: MEDICARE

## 2025-01-25 ENCOUNTER — HOSPITAL ENCOUNTER (INPATIENT)
Facility: HOSPITAL | Age: 70
LOS: 3 days | Discharge: HOME | DRG: 191 | End: 2025-01-28
Attending: EMERGENCY MEDICINE | Admitting: STUDENT IN AN ORGANIZED HEALTH CARE EDUCATION/TRAINING PROGRAM
Payer: MEDICARE

## 2025-01-25 DIAGNOSIS — J44.1 ACUTE EXACERBATION OF CHRONIC OBSTRUCTIVE PULMONARY DISEASE (COPD): Primary | ICD-10-CM

## 2025-01-25 DIAGNOSIS — R26.2 DIFFICULTY WALKING: ICD-10-CM

## 2025-01-25 DIAGNOSIS — R09.02 HYPOXIA: ICD-10-CM

## 2025-01-25 DIAGNOSIS — J44.1 COPD EXACERBATION: ICD-10-CM

## 2025-01-25 LAB
ALBUMIN SERPL-MCNC: 3.8 G/DL (ref 3.5–5.2)
ALBUMIN SERPL-MCNC: 4 G/DL (ref 3.5–5.2)
ALBUMIN/GLOB SERPL: 1.5 G/DL
ALBUMIN/GLOB SERPL: 1.7 G/DL
ALP SERPL-CCNC: 120 U/L (ref 39–117)
ALP SERPL-CCNC: 125 U/L (ref 39–117)
ALT SERPL W P-5'-P-CCNC: 17 U/L (ref 1–41)
ALT SERPL W P-5'-P-CCNC: 17 U/L (ref 1–41)
ANION GAP SERPL CALCULATED.3IONS-SCNC: 15.5 MMOL/L (ref 5–15)
ANION GAP SERPL CALCULATED.3IONS-SCNC: 20.1 MMOL/L (ref 5–15)
AST SERPL-CCNC: 20 U/L (ref 1–40)
AST SERPL-CCNC: 23 U/L (ref 1–40)
BASOPHILS # BLD AUTO: 0.03 10*3/MM3 (ref 0–0.2)
BASOPHILS # BLD AUTO: 0.05 10*3/MM3 (ref 0–0.2)
BASOPHILS NFR BLD AUTO: 0.2 % (ref 0–1.5)
BASOPHILS NFR BLD AUTO: 0.4 % (ref 0–1.5)
BILIRUB SERPL-MCNC: 0.3 MG/DL (ref 0–1.2)
BILIRUB SERPL-MCNC: 0.4 MG/DL (ref 0–1.2)
BUN SERPL-MCNC: 14 MG/DL (ref 8–23)
BUN SERPL-MCNC: 19 MG/DL (ref 8–23)
BUN/CREAT SERPL: 12.5 (ref 7–25)
BUN/CREAT SERPL: 8.6 (ref 7–25)
CALCIUM SPEC-SCNC: 8.7 MG/DL (ref 8.6–10.5)
CALCIUM SPEC-SCNC: 8.9 MG/DL (ref 8.6–10.5)
CHLORIDE SERPL-SCNC: 106 MMOL/L (ref 98–107)
CHLORIDE SERPL-SCNC: 107 MMOL/L (ref 98–107)
CO2 SERPL-SCNC: 15.9 MMOL/L (ref 22–29)
CO2 SERPL-SCNC: 19.5 MMOL/L (ref 22–29)
CREAT SERPL-MCNC: 1.52 MG/DL (ref 0.76–1.27)
CREAT SERPL-MCNC: 1.62 MG/DL (ref 0.76–1.27)
D-LACTATE SERPL-SCNC: 3.1 MMOL/L (ref 0.5–2)
D-LACTATE SERPL-SCNC: 3.3 MMOL/L (ref 0.5–2)
D-LACTATE SERPL-SCNC: 3.8 MMOL/L (ref 0.5–2)
D-LACTATE SERPL-SCNC: 4.4 MMOL/L (ref 0.5–2)
D-LACTATE SERPL-SCNC: 7.5 MMOL/L (ref 0.5–2)
DEPRECATED RDW RBC AUTO: 44.2 FL (ref 37–54)
DEPRECATED RDW RBC AUTO: 46.5 FL (ref 37–54)
EGFRCR SERPLBLD CKD-EPI 2021: 46.2 ML/MIN/1.73
EGFRCR SERPLBLD CKD-EPI 2021: 49.9 ML/MIN/1.73
EOSINOPHIL # BLD AUTO: 0 10*3/MM3 (ref 0–0.4)
EOSINOPHIL # BLD AUTO: 0.06 10*3/MM3 (ref 0–0.4)
EOSINOPHIL NFR BLD AUTO: 0 % (ref 0.3–6.2)
EOSINOPHIL NFR BLD AUTO: 0.5 % (ref 0.3–6.2)
ERYTHROCYTE [DISTWIDTH] IN BLOOD BY AUTOMATED COUNT: 12.5 % (ref 12.3–15.4)
ERYTHROCYTE [DISTWIDTH] IN BLOOD BY AUTOMATED COUNT: 12.7 % (ref 12.3–15.4)
FLUAV SUBTYP SPEC NAA+PROBE: NOT DETECTED
FLUBV RNA ISLT QL NAA+PROBE: NOT DETECTED
GEN 5 1HR TROPONIN T REFLEX: 20 NG/L
GLOBULIN UR ELPH-MCNC: 2.3 GM/DL
GLOBULIN UR ELPH-MCNC: 2.7 GM/DL
GLUCOSE SERPL-MCNC: 151 MG/DL (ref 65–99)
GLUCOSE SERPL-MCNC: 98 MG/DL (ref 65–99)
HCT VFR BLD AUTO: 39.3 % (ref 37.5–51)
HCT VFR BLD AUTO: 42.3 % (ref 37.5–51)
HGB BLD-MCNC: 12.7 G/DL (ref 13–17.7)
HGB BLD-MCNC: 13.3 G/DL (ref 13–17.7)
HOLD SPECIMEN: NORMAL
HOLD SPECIMEN: NORMAL
IMM GRANULOCYTES # BLD AUTO: 0.04 10*3/MM3 (ref 0–0.05)
IMM GRANULOCYTES # BLD AUTO: 0.04 10*3/MM3 (ref 0–0.05)
IMM GRANULOCYTES NFR BLD AUTO: 0.3 % (ref 0–0.5)
IMM GRANULOCYTES NFR BLD AUTO: 0.3 % (ref 0–0.5)
LYMPHOCYTES # BLD AUTO: 0.53 10*3/MM3 (ref 0.7–3.1)
LYMPHOCYTES # BLD AUTO: 1.79 10*3/MM3 (ref 0.7–3.1)
LYMPHOCYTES NFR BLD AUTO: 13.5 % (ref 19.6–45.3)
LYMPHOCYTES NFR BLD AUTO: 4 % (ref 19.6–45.3)
MAGNESIUM SERPL-MCNC: 1.9 MG/DL (ref 1.6–2.4)
MCH RBC QN AUTO: 31.2 PG (ref 26.6–33)
MCH RBC QN AUTO: 31.4 PG (ref 26.6–33)
MCHC RBC AUTO-ENTMCNC: 31.4 G/DL (ref 31.5–35.7)
MCHC RBC AUTO-ENTMCNC: 32.3 G/DL (ref 31.5–35.7)
MCV RBC AUTO: 96.6 FL (ref 79–97)
MCV RBC AUTO: 99.8 FL (ref 79–97)
MONOCYTES # BLD AUTO: 0.23 10*3/MM3 (ref 0.1–0.9)
MONOCYTES # BLD AUTO: 1.25 10*3/MM3 (ref 0.1–0.9)
MONOCYTES NFR BLD AUTO: 1.7 % (ref 5–12)
MONOCYTES NFR BLD AUTO: 9.4 % (ref 5–12)
NEUTROPHILS NFR BLD AUTO: 10.05 10*3/MM3 (ref 1.7–7)
NEUTROPHILS NFR BLD AUTO: 12.55 10*3/MM3 (ref 1.7–7)
NEUTROPHILS NFR BLD AUTO: 75.9 % (ref 42.7–76)
NEUTROPHILS NFR BLD AUTO: 93.8 % (ref 42.7–76)
NRBC BLD AUTO-RTO: 0 /100 WBC (ref 0–0.2)
NRBC BLD AUTO-RTO: 0 /100 WBC (ref 0–0.2)
NT-PROBNP SERPL-MCNC: 294.5 PG/ML (ref 0–900)
PHOSPHATE SERPL-MCNC: 3.2 MG/DL (ref 2.5–4.5)
PLATELET # BLD AUTO: 217 10*3/MM3 (ref 140–450)
PLATELET # BLD AUTO: 229 10*3/MM3 (ref 140–450)
PMV BLD AUTO: 10.9 FL (ref 6–12)
PMV BLD AUTO: 11.2 FL (ref 6–12)
POTASSIUM SERPL-SCNC: 3.5 MMOL/L (ref 3.5–5.2)
POTASSIUM SERPL-SCNC: 4.1 MMOL/L (ref 3.5–5.2)
PROT SERPL-MCNC: 6.1 G/DL (ref 6–8.5)
PROT SERPL-MCNC: 6.7 G/DL (ref 6–8.5)
QT INTERVAL: 280 MS
QT INTERVAL: 311 MS
QTC INTERVAL: 407 MS
QTC INTERVAL: 433 MS
RBC # BLD AUTO: 4.07 10*6/MM3 (ref 4.14–5.8)
RBC # BLD AUTO: 4.24 10*6/MM3 (ref 4.14–5.8)
RSV RNA NPH QL NAA+NON-PROBE: DETECTED
SARS-COV-2 RNA RESP QL NAA+PROBE: NOT DETECTED
SODIUM SERPL-SCNC: 142 MMOL/L (ref 136–145)
SODIUM SERPL-SCNC: 142 MMOL/L (ref 136–145)
TROPONIN T NUMERIC DELTA: 4 NG/L
TROPONIN T SERPL HS-MCNC: 12 NG/L
TROPONIN T SERPL HS-MCNC: 16 NG/L
WBC NRBC COR # BLD AUTO: 13.24 10*3/MM3 (ref 3.4–10.8)
WBC NRBC COR # BLD AUTO: 13.38 10*3/MM3 (ref 3.4–10.8)
WHOLE BLOOD HOLD COAG: NORMAL
WHOLE BLOOD HOLD SPECIMEN: NORMAL

## 2025-01-25 PROCEDURE — 93010 ELECTROCARDIOGRAM REPORT: CPT | Performed by: INTERNAL MEDICINE

## 2025-01-25 PROCEDURE — 83735 ASSAY OF MAGNESIUM: CPT | Performed by: STUDENT IN AN ORGANIZED HEALTH CARE EDUCATION/TRAINING PROGRAM

## 2025-01-25 PROCEDURE — 94664 DEMO&/EVAL PT USE INHALER: CPT

## 2025-01-25 PROCEDURE — 86738 MYCOPLASMA ANTIBODY: CPT | Performed by: FAMILY MEDICINE

## 2025-01-25 PROCEDURE — 85025 COMPLETE CBC W/AUTO DIFF WBC: CPT | Performed by: STUDENT IN AN ORGANIZED HEALTH CARE EDUCATION/TRAINING PROGRAM

## 2025-01-25 PROCEDURE — 85025 COMPLETE CBC W/AUTO DIFF WBC: CPT | Performed by: EMERGENCY MEDICINE

## 2025-01-25 PROCEDURE — 84484 ASSAY OF TROPONIN QUANT: CPT | Performed by: INTERNAL MEDICINE

## 2025-01-25 PROCEDURE — 94799 UNLISTED PULMONARY SVC/PX: CPT

## 2025-01-25 PROCEDURE — 94761 N-INVAS EAR/PLS OXIMETRY MLT: CPT

## 2025-01-25 PROCEDURE — 84100 ASSAY OF PHOSPHORUS: CPT | Performed by: STUDENT IN AN ORGANIZED HEALTH CARE EDUCATION/TRAINING PROGRAM

## 2025-01-25 PROCEDURE — 83605 ASSAY OF LACTIC ACID: CPT | Performed by: INTERNAL MEDICINE

## 2025-01-25 PROCEDURE — 25810000003 SODIUM CHLORIDE 0.9 % SOLUTION: Performed by: INTERNAL MEDICINE

## 2025-01-25 PROCEDURE — 99285 EMERGENCY DEPT VISIT HI MDM: CPT

## 2025-01-25 PROCEDURE — 84484 ASSAY OF TROPONIN QUANT: CPT | Performed by: EMERGENCY MEDICINE

## 2025-01-25 PROCEDURE — 83605 ASSAY OF LACTIC ACID: CPT | Performed by: EMERGENCY MEDICINE

## 2025-01-25 PROCEDURE — 63710000001 REVEFENACIN 175 MCG/3ML SOLUTION: Performed by: STUDENT IN AN ORGANIZED HEALTH CARE EDUCATION/TRAINING PROGRAM

## 2025-01-25 PROCEDURE — 93005 ELECTROCARDIOGRAM TRACING: CPT | Performed by: INTERNAL MEDICINE

## 2025-01-25 PROCEDURE — 80053 COMPREHEN METABOLIC PANEL: CPT | Performed by: EMERGENCY MEDICINE

## 2025-01-25 PROCEDURE — 25010000002 METHYLPREDNISOLONE PER 40 MG: Performed by: STUDENT IN AN ORGANIZED HEALTH CARE EDUCATION/TRAINING PROGRAM

## 2025-01-25 PROCEDURE — 93005 ELECTROCARDIOGRAM TRACING: CPT | Performed by: EMERGENCY MEDICINE

## 2025-01-25 PROCEDURE — 25010000002 METHYLPREDNISOLONE PER 125 MG: Performed by: EMERGENCY MEDICINE

## 2025-01-25 PROCEDURE — 94640 AIRWAY INHALATION TREATMENT: CPT

## 2025-01-25 PROCEDURE — 87040 BLOOD CULTURE FOR BACTERIA: CPT | Performed by: EMERGENCY MEDICINE

## 2025-01-25 PROCEDURE — 87637 SARSCOV2&INF A&B&RSV AMP PRB: CPT | Performed by: EMERGENCY MEDICINE

## 2025-01-25 PROCEDURE — 80053 COMPREHEN METABOLIC PANEL: CPT | Performed by: STUDENT IN AN ORGANIZED HEALTH CARE EDUCATION/TRAINING PROGRAM

## 2025-01-25 PROCEDURE — 83880 ASSAY OF NATRIURETIC PEPTIDE: CPT | Performed by: EMERGENCY MEDICINE

## 2025-01-25 PROCEDURE — 71045 X-RAY EXAM CHEST 1 VIEW: CPT

## 2025-01-25 PROCEDURE — 36415 COLL VENOUS BLD VENIPUNCTURE: CPT

## 2025-01-25 PROCEDURE — 25010000002 HEPARIN (PORCINE) PER 1000 UNITS: Performed by: STUDENT IN AN ORGANIZED HEALTH CARE EDUCATION/TRAINING PROGRAM

## 2025-01-25 PROCEDURE — 25010000002 MAGNESIUM SULFATE IN D5W 1G/100ML (PREMIX) 1-5 GM/100ML-% SOLUTION: Performed by: INTERNAL MEDICINE

## 2025-01-25 PROCEDURE — 99223 1ST HOSP IP/OBS HIGH 75: CPT | Performed by: STUDENT IN AN ORGANIZED HEALTH CARE EDUCATION/TRAINING PROGRAM

## 2025-01-25 RX ORDER — LEVOCETIRIZINE DIHYDROCHLORIDE 5 MG/1
5 TABLET, FILM COATED ORAL EVERY EVENING
COMMUNITY
Start: 2025-01-03

## 2025-01-25 RX ORDER — ALBUTEROL SULFATE 0.83 MG/ML
2.5 SOLUTION RESPIRATORY (INHALATION) EVERY 6 HOURS PRN
Status: DISCONTINUED | OUTPATIENT
Start: 2025-01-25 | End: 2025-01-28 | Stop reason: HOSPADM

## 2025-01-25 RX ORDER — ACETAMINOPHEN 325 MG/1
650 TABLET ORAL EVERY 4 HOURS PRN
Status: DISCONTINUED | OUTPATIENT
Start: 2025-01-25 | End: 2025-01-28 | Stop reason: HOSPADM

## 2025-01-25 RX ORDER — SODIUM CHLORIDE 0.9 % (FLUSH) 0.9 %
10 SYRINGE (ML) INJECTION AS NEEDED
Status: DISCONTINUED | OUTPATIENT
Start: 2025-01-25 | End: 2025-01-28 | Stop reason: HOSPADM

## 2025-01-25 RX ORDER — AMOXICILLIN 250 MG
2 CAPSULE ORAL 2 TIMES DAILY PRN
Status: DISCONTINUED | OUTPATIENT
Start: 2025-01-25 | End: 2025-01-28 | Stop reason: HOSPADM

## 2025-01-25 RX ORDER — SODIUM CHLORIDE 0.9 % (FLUSH) 0.9 %
10 SYRINGE (ML) INJECTION EVERY 12 HOURS SCHEDULED
Status: DISCONTINUED | OUTPATIENT
Start: 2025-01-25 | End: 2025-01-28 | Stop reason: HOSPADM

## 2025-01-25 RX ORDER — BISACODYL 10 MG
10 SUPPOSITORY, RECTAL RECTAL DAILY PRN
Status: DISCONTINUED | OUTPATIENT
Start: 2025-01-25 | End: 2025-01-28 | Stop reason: HOSPADM

## 2025-01-25 RX ORDER — GUAIFENESIN AND DEXTROMETHORPHAN HYDROBROMIDE 600; 30 MG/1; MG/1
2 TABLET, EXTENDED RELEASE ORAL 2 TIMES DAILY
Status: DISCONTINUED | OUTPATIENT
Start: 2025-01-25 | End: 2025-01-28 | Stop reason: HOSPADM

## 2025-01-25 RX ORDER — IPRATROPIUM BROMIDE AND ALBUTEROL SULFATE 2.5; .5 MG/3ML; MG/3ML
SOLUTION RESPIRATORY (INHALATION)
Status: COMPLETED
Start: 2025-01-25 | End: 2025-01-25

## 2025-01-25 RX ORDER — SODIUM CHLORIDE 9 MG/ML
125 INJECTION, SOLUTION INTRAVENOUS CONTINUOUS
Status: DISCONTINUED | OUTPATIENT
Start: 2025-01-25 | End: 2025-01-26

## 2025-01-25 RX ORDER — IPRATROPIUM BROMIDE AND ALBUTEROL SULFATE 2.5; .5 MG/3ML; MG/3ML
3 SOLUTION RESPIRATORY (INHALATION)
Status: DISCONTINUED | OUTPATIENT
Start: 2025-01-25 | End: 2025-01-28 | Stop reason: HOSPADM

## 2025-01-25 RX ORDER — HEPARIN SODIUM 5000 [USP'U]/ML
5000 INJECTION, SOLUTION INTRAVENOUS; SUBCUTANEOUS EVERY 8 HOURS SCHEDULED
Status: DISCONTINUED | OUTPATIENT
Start: 2025-01-25 | End: 2025-01-28 | Stop reason: HOSPADM

## 2025-01-25 RX ORDER — NITROGLYCERIN 0.4 MG/1
0.4 TABLET SUBLINGUAL
Status: DISCONTINUED | OUTPATIENT
Start: 2025-01-25 | End: 2025-01-28 | Stop reason: HOSPADM

## 2025-01-25 RX ORDER — METOPROLOL SUCCINATE 25 MG/1
25 TABLET, EXTENDED RELEASE ORAL DAILY
Status: DISCONTINUED | OUTPATIENT
Start: 2025-01-26 | End: 2025-01-28 | Stop reason: HOSPADM

## 2025-01-25 RX ORDER — BISACODYL 5 MG/1
5 TABLET, DELAYED RELEASE ORAL DAILY PRN
Status: DISCONTINUED | OUTPATIENT
Start: 2025-01-25 | End: 2025-01-28 | Stop reason: HOSPADM

## 2025-01-25 RX ORDER — HYDROCODONE POLISTIREX AND CHLORPHENIRAMINE POLISTIREX 10; 8 MG/5ML; MG/5ML
5 SUSPENSION, EXTENDED RELEASE ORAL ONCE
Status: COMPLETED | OUTPATIENT
Start: 2025-01-25 | End: 2025-01-25

## 2025-01-25 RX ORDER — OMEPRAZOLE 40 MG/1
40 CAPSULE, DELAYED RELEASE ORAL DAILY
COMMUNITY
Start: 2024-12-27

## 2025-01-25 RX ORDER — ALBUTEROL SULFATE 90 UG/1
2 AEROSOL, METERED RESPIRATORY (INHALATION) EVERY 6 HOURS PRN
COMMUNITY
Start: 2025-01-22

## 2025-01-25 RX ORDER — SODIUM CHLORIDE 9 MG/ML
125 INJECTION, SOLUTION INTRAVENOUS CONTINUOUS
Status: DISCONTINUED | OUTPATIENT
Start: 2025-01-25 | End: 2025-01-25

## 2025-01-25 RX ORDER — POLYETHYLENE GLYCOL 3350 17 G/17G
17 POWDER, FOR SOLUTION ORAL DAILY PRN
Status: DISCONTINUED | OUTPATIENT
Start: 2025-01-25 | End: 2025-01-28 | Stop reason: HOSPADM

## 2025-01-25 RX ORDER — HYDROCODONE POLISTIREX AND CHLORPHENIRAMINE POLISTIREX 10; 8 MG/5ML; MG/5ML
5 SUSPENSION, EXTENDED RELEASE ORAL EVERY 12 HOURS PRN
Status: DISCONTINUED | OUTPATIENT
Start: 2025-01-25 | End: 2025-01-28 | Stop reason: HOSPADM

## 2025-01-25 RX ORDER — METOPROLOL SUCCINATE 25 MG/1
25 TABLET, EXTENDED RELEASE ORAL DAILY
COMMUNITY
Start: 2024-12-27

## 2025-01-25 RX ORDER — PANTOPRAZOLE SODIUM 40 MG/1
40 TABLET, DELAYED RELEASE ORAL
Status: DISCONTINUED | OUTPATIENT
Start: 2025-01-26 | End: 2025-01-28 | Stop reason: HOSPADM

## 2025-01-25 RX ORDER — DICLOFENAC SODIUM 75 MG/1
75 TABLET, DELAYED RELEASE ORAL 2 TIMES DAILY
COMMUNITY
Start: 2024-12-27

## 2025-01-25 RX ORDER — MAGNESIUM SULFATE 1 G/100ML
1 INJECTION INTRAVENOUS ONCE
Status: COMPLETED | OUTPATIENT
Start: 2025-01-25 | End: 2025-01-25

## 2025-01-25 RX ORDER — METHYLPREDNISOLONE SODIUM SUCCINATE 125 MG/2ML
125 INJECTION, POWDER, LYOPHILIZED, FOR SOLUTION INTRAMUSCULAR; INTRAVENOUS ONCE
Status: COMPLETED | OUTPATIENT
Start: 2025-01-25 | End: 2025-01-25

## 2025-01-25 RX ORDER — BUDESONIDE 0.5 MG/2ML
0.5 INHALANT ORAL
Status: DISCONTINUED | OUTPATIENT
Start: 2025-01-25 | End: 2025-01-28 | Stop reason: HOSPADM

## 2025-01-25 RX ORDER — SODIUM CHLORIDE 9 MG/ML
40 INJECTION, SOLUTION INTRAVENOUS AS NEEDED
Status: DISCONTINUED | OUTPATIENT
Start: 2025-01-25 | End: 2025-01-28 | Stop reason: HOSPADM

## 2025-01-25 RX ORDER — ARFORMOTEROL TARTRATE 15 UG/2ML
15 SOLUTION RESPIRATORY (INHALATION)
Status: DISCONTINUED | OUTPATIENT
Start: 2025-01-25 | End: 2025-01-28 | Stop reason: HOSPADM

## 2025-01-25 RX ORDER — IPRATROPIUM BROMIDE AND ALBUTEROL SULFATE 2.5; .5 MG/3ML; MG/3ML
3 SOLUTION RESPIRATORY (INHALATION)
Status: COMPLETED | OUTPATIENT
Start: 2025-01-25 | End: 2025-01-25

## 2025-01-25 RX ADMIN — GUAIFENESIN AND DEXTROMETHORPHAN HYDROBROMIDE 2 TABLET: 600; 30 TABLET, EXTENDED RELEASE ORAL at 11:32

## 2025-01-25 RX ADMIN — Medication 10 ML: at 21:17

## 2025-01-25 RX ADMIN — GUAIFENESIN AND DEXTROMETHORPHAN HYDROBROMIDE 2 TABLET: 600; 30 TABLET, EXTENDED RELEASE ORAL at 21:16

## 2025-01-25 RX ADMIN — METHYLPREDNISOLONE SODIUM SUCCINATE 125 MG: 125 INJECTION, POWDER, FOR SOLUTION INTRAMUSCULAR; INTRAVENOUS at 03:53

## 2025-01-25 RX ADMIN — IPRATROPIUM BROMIDE AND ALBUTEROL SULFATE 3 ML: .5; 3 SOLUTION RESPIRATORY (INHALATION) at 03:51

## 2025-01-25 RX ADMIN — SODIUM CHLORIDE 125 ML/HR: 9 INJECTION, SOLUTION INTRAVENOUS at 11:14

## 2025-01-25 RX ADMIN — WATER 40 MG: 1 INJECTION INTRAMUSCULAR; INTRAVENOUS; SUBCUTANEOUS at 13:11

## 2025-01-25 RX ADMIN — BUDESONIDE 0.5 MG: 0.5 INHALANT RESPIRATORY (INHALATION) at 08:05

## 2025-01-25 RX ADMIN — HEPARIN SODIUM 5000 UNITS: 5000 INJECTION INTRAVENOUS; SUBCUTANEOUS at 08:16

## 2025-01-25 RX ADMIN — SODIUM CHLORIDE 125 ML/HR: 9 INJECTION, SOLUTION INTRAVENOUS at 18:28

## 2025-01-25 RX ADMIN — REVEFENACIN 175 MCG: 175 SOLUTION RESPIRATORY (INHALATION) at 08:05

## 2025-01-25 RX ADMIN — IPRATROPIUM BROMIDE AND ALBUTEROL SULFATE 3 ML: .5; 3 SOLUTION RESPIRATORY (INHALATION) at 18:59

## 2025-01-25 RX ADMIN — HYDROCODONE POLISTIREX AND CHLORPHENIRAMINE POLISTIREX 5 ML: 10; 8 SUSPENSION, EXTENDED RELEASE ORAL at 08:17

## 2025-01-25 RX ADMIN — SODIUM CHLORIDE 125 ML/HR: 9 INJECTION, SOLUTION INTRAVENOUS at 08:16

## 2025-01-25 RX ADMIN — IPRATROPIUM BROMIDE AND ALBUTEROL SULFATE 3 ML: .5; 3 SOLUTION RESPIRATORY (INHALATION) at 11:10

## 2025-01-25 RX ADMIN — Medication 10 ML: at 08:17

## 2025-01-25 RX ADMIN — IPRATROPIUM BROMIDE AND ALBUTEROL SULFATE 3 ML: .5; 3 SOLUTION RESPIRATORY (INHALATION) at 02:02

## 2025-01-25 RX ADMIN — WATER 40 MG: 1 INJECTION INTRAMUSCULAR; INTRAVENOUS; SUBCUTANEOUS at 21:16

## 2025-01-25 RX ADMIN — ARFORMOTEROL TARTRATE 15 MCG: 15 SOLUTION RESPIRATORY (INHALATION) at 08:05

## 2025-01-25 RX ADMIN — ACETAMINOPHEN 650 MG: 325 TABLET ORAL at 14:57

## 2025-01-25 RX ADMIN — HEPARIN SODIUM 5000 UNITS: 5000 INJECTION INTRAVENOUS; SUBCUTANEOUS at 21:16

## 2025-01-25 RX ADMIN — ARFORMOTEROL TARTRATE 15 MCG: 15 SOLUTION RESPIRATORY (INHALATION) at 18:59

## 2025-01-25 RX ADMIN — IPRATROPIUM BROMIDE AND ALBUTEROL SULFATE 3 ML: .5; 3 SOLUTION RESPIRATORY (INHALATION) at 02:03

## 2025-01-25 RX ADMIN — HEPARIN SODIUM 5000 UNITS: 5000 INJECTION INTRAVENOUS; SUBCUTANEOUS at 14:56

## 2025-01-25 RX ADMIN — MAGNESIUM SULFATE 1 G: 1 INJECTION INTRAVENOUS at 11:32

## 2025-01-25 RX ADMIN — SODIUM CHLORIDE 2000 ML: 9 INJECTION, SOLUTION INTRAVENOUS at 09:20

## 2025-01-25 RX ADMIN — IPRATROPIUM BROMIDE AND ALBUTEROL SULFATE 3 ML: .5; 3 SOLUTION RESPIRATORY (INHALATION) at 02:01

## 2025-01-25 RX ADMIN — BUDESONIDE 0.5 MG: 0.5 INHALANT RESPIRATORY (INHALATION) at 18:59

## 2025-01-25 NOTE — H&P
[facility]  Date: [facility]   Patient Name: Jonathan Guerra Jr.  : 1955  MRN: [id number]  Primary Care Physician:  Ruel Hung MD  Date of admission: 2025    Subjective   Subjective     Chief Complaint: Shortness of breath    HPI:    Jonathan Guerra Jr. is a 69 y.o. male  with past medical history of hypertension, hyperlipidemia, CKD stage IIIb, asthma, COPD, depression presented to the ED for evaluation of shortness of breath since 1 day that has been progressively worsening.  Symptoms did not get better despite using the nebulizer treatments at home.  Yesterday patient took 20 mg of prednisone that he had from the previous prescriptions.  Associated with increased cough and clear sputum production.  Denies any fevers, chills, nausea, vomiting, diarrhea.    Upon arrival, vital signs temperature 98.4, pulse 132, respiratory 30, blood pressure 165/68 on room air saturating around 90%.  Labs WBC 13.24, hemoglobin 12.7, platelets 217.  CMP showed bicarb 19.5, anion gap 15.5, creatinine 1.62, around the baseline rest of the CMP with no significant findings.  COVID flu RSV negative.  Chest x-ray showed no acute infiltrate.  Received nebulizer treatments and methylprednisolone in the ED.  Patient has been admitted for further evaluation and management of COPD/asthma exacerbation.    Personal History     Past Medical History:   Diagnosis Date    Acid reflux     Acid reflux disease     Anemia, unspecified     Anxiety     Arthritis     Asthma     Broken bones     Calcaneus fracture, left     Chronic bronchitis     COPD (chronic obstructive pulmonary disease)     Depression     Diverticulitis     Emphysema lung     Head injury     skull fracture     Hernia cerebri     HTN (hypertension)     Hyperlipemia     Lung disease          Past Surgical History:   Procedure Laterality Date    COLONOSCOPY  , 2019    ENDOSCOPY           Family History   Problem Relation  Age of Onset    Other Mother         Thyroid Gland Neoplasm    Heart disease Mother     Colon cancer Mother     Diabetes Sister     Colon cancer Paternal Grandmother          Social History     Socioeconomic History    Marital status: Legally    Tobacco Use    Smoking status: Former     Current packs/day: 0.00     Average packs/day: 1.5 packs/day for 40.0 years (60.0 ttl pk-yrs)     Types: Cigarettes     Start date:      Quit date: 2006     Years since quittin.0     Passive exposure: Past    Smokeless tobacco: Never   Vaping Use    Vaping status: Never Used   Substance and Sexual Activity    Alcohol use: Yes     Comment: Occassional    Drug use: Never    Sexual activity: Defer         Home Medications:  Budeson-Glycopyrrol-Formoterol, albuterol, atorvastatin, cetirizine, doxycycline, ketotifen, and predniSONE    Allergies:  Allergies   Allergen Reactions    Azithromycin Unknown - High Severity    Codeine Unknown - High Severity    Penicillins Unknown - High Severity       Objective   Objective     Vitals:   Temp:  [98.4 °F (36.9 °C)] 98.4 °F (36.9 °C)  Heart Rate:  [113-132] 116  Resp:  [24-30] 24  BP: (131-165)/(61-68) 131/61    Physical Exam    Constitutional: Awake, alert, mild respiratory distress   Eyes: Pupils equal, sclerae anicteric, no conjunctival injection   HENT: NCAT, mucous membranes moist   Respiratory: Bilateral air entry present, significant wheezing diffusely bilaterally, mild respiratory distress   Cardiovascular: Regular, tachycardia, no murmurs   Gastrointestinal: soft, nontender, nondistended   Musculoskeletal: No bilateral ankle edema, no clubbing or cyanosis to extremities   Neurologic: Oriented x 3, speech clear   Skin: No rashes     Result Review    Result Review:  I have personally reviewed the results from the time of this admission to 2025 03:39 EST and agree with these findings:  [x]  Laboratory  []  Microbiology  [x]  Radiology  [x]  EKG/Telemetry   []   Cardiology/Vascular   []  Pathology  []  Old records  []  Other:        Imaging Results (Last 24 Hours)       Procedure Component Value Units Date/Time    XR Chest 1 View [[id number]] Collected: 01/25/25 0200     Updated: 01/25/25 0203    Narrative:      XR CHEST 1 VW-     Date of exam: 1/25/2025, 1:53 A.M.     Indication: SOA/SOB/shortness of air/shortness of breath     Comparison: 1/1/2025.     FINDINGS:  A single AP (or PA) upright portable chest radiograph was performed. No  cardiac enlargement is seen. No acute infiltrate is appreciated. No  pleural effusion or pneumothorax is identified. The thoracic aorta is  atherosclerotic. Chronic calcified granulomatous disease involves the  chest. No significant interval change is seen since the prior study (or  studies).          Impression:      No acute infiltrate is appreciated.           Portions of this note were completed with a voice recognition program.     Electronically Signed By-Jayjay Varner MD On:1/25/2025 2:01 AM                methylPREDNISolone sodium succinate, 125 mg, Intravenous, Once         Assessment & Plan   Assessment / Plan     Assessment/Plan:   COPD/asthma exacerbation  Hypertension  Hyperlipidemia  CKD stage IIIb  Depression    Plan  Admit to inpatient, telemetry  Wean oxygen as tolerated  Brovana, Pulmicort, revefenacin  Bronchodilator protocol  Albuterol every 6 hours as needed  Methylprednisolone 40 mg IV every 8 hours  Consider pulmonology consult based on the clinical response  Heart healthy diet  Resume other appropriate home medications once reconciled      VTE Prophylaxis:  Pharmacologic VTE prophylaxis orders are signed & held.      CODE STATUS:    Level Of Support Discussed With: Patient  Code Status (Patient has no pulse and is not breathing): CPR (Attempt to Resuscitate)  Medical Interventions (Patient has pulse or is breathing): Full Support      Admission Status:  I believe this patient meets inpatient status.    Part of this  note may be an electronic transcription/translation of spoken language to printed text using the Dragon Dictation System    Norma Peterson MD

## 2025-01-25 NOTE — PLAN OF CARE
Goal Outcome Evaluation:  Plan of Care Reviewed With: patient        Progress: improving  Outcome Evaluation: Pt alert and oriented. Pt very SOB, pt is on 3LNC. Elevated HR with activity. Pt complains of pain with coughing. Treated per MAR. Continue with plan of care.

## 2025-01-25 NOTE — ED PROVIDER NOTES
Time: 1:52 AM EST  Date of encounter:  1/25/2025  Independent Historian/Clinical History and Information was obtained by:   Patient  Chief Complaint: Shortness of breath    History is limited by: N/A    History of Present Illness:  Patient is a 69 y.o. year old male who presents to the emergency department for evaluation of shortness of breath.  Patient notes that he has COPD and asthma.  Patient states that he had worsening dyspnea starting yesterday and has progressed over 24 hours.  He states that he initially had shortness of breath with mild exertion and now he is at rest.  Patient notes chest congestion and a nonproductive cough.  Patient had no fever or rigors.  Patient's had no nausea or vomiting.  The patient's had no central chest discomfort or back discomfort.  He has no pain or swelling in his legs.    HPI    Patient Care Team  Primary Care Provider: Ruel Hung MD    Past Medical History:     Allergies   Allergen Reactions    Azithromycin Unknown - High Severity    Codeine Unknown - High Severity    Penicillins Unknown - High Severity     Past Medical History:   Diagnosis Date    Acid reflux     Acid reflux disease     Anemia, unspecified     Anxiety     Arthritis     Asthma     Broken bones     Calcaneus fracture, left     Chronic bronchitis     COPD (chronic obstructive pulmonary disease)     Depression     Diverticulitis     Emphysema lung     Head injury     skull fracture     Hernia cerebri     HTN (hypertension)     Hyperlipemia     Lung disease      Past Surgical History:   Procedure Laterality Date    COLONOSCOPY  2014, 2019    ENDOSCOPY       Family History   Problem Relation Age of Onset    Other Mother         Thyroid Gland Neoplasm    Heart disease Mother     Colon cancer Mother     Diabetes Sister     Colon cancer Paternal Grandmother        Home Medications:  Prior to Admission medications    Medication Sig Start Date End Date Taking? Authorizing Provider   albuterol (ACCUNEB) 0.63  MG/3ML nebulizer solution Take 3 mL by nebulization Every 6 (Six) Hours As Needed for Wheezing or Shortness of Air. 24   Garrett Hill MD   atorvastatin (LIPITOR) 80 MG tablet Take 1 tablet by mouth Daily. 23   Casey Mosley MD   Budeson-Glycopyrrol-Formoterol (Breztri Aerosphere) 160-9-4.8 MCG/ACT aerosol inhaler Inhale 2 puffs 2 (Two) Times a Day. 23   Casey Mosley MD   cetirizine (zyrTEC) 10 MG tablet TAKE 1 TABLET BY MOUTH DAILY. 24   Casey Mosley MD   doxycycline (MONODOX) 100 MG capsule Take 1 capsule by mouth 2 (Two) Times a Day. 24   Garrett Hill MD   ketotifen (ZADITOR) 0.025 % ophthalmic solution Administer 2 drops to both eyes 2 (Two) Times a Day. 22   ProviderDillon MD   predniSONE (DELTASONE) 50 MG tablet Take 1 tablet by mouth Daily. 24   Garrett Hill MD        Social History:   Social History     Tobacco Use    Smoking status: Former     Current packs/day: 0.00     Average packs/day: 1.5 packs/day for 40.0 years (60.0 ttl pk-yrs)     Types: Cigarettes     Start date:      Quit date:      Years since quittin.0     Passive exposure: Past    Smokeless tobacco: Never   Vaping Use    Vaping status: Never Used   Substance Use Topics    Alcohol use: Yes     Comment: Occassional    Drug use: Never         Review of Systems:  Review of Systems   Constitutional:  Positive for fatigue. Negative for chills, diaphoresis and fever.   HENT:  Negative for congestion, postnasal drip, rhinorrhea and sore throat.    Eyes:  Negative for photophobia.   Respiratory:  Positive for cough, chest tightness, shortness of breath and wheezing.    Cardiovascular:  Negative for chest pain, palpitations and leg swelling.   Gastrointestinal:  Negative for abdominal pain, diarrhea, nausea and vomiting.   Genitourinary:  Negative for difficulty urinating, dysuria, flank pain, frequency, hematuria and urgency.   Musculoskeletal:  Negative for neck pain and neck  stiffness.   Skin:  Negative for pallor and rash.   Neurological:  Negative for dizziness, syncope, weakness, numbness and headaches.   Hematological:  Negative for adenopathy. Does not bruise/bleed easily.   Psychiatric/Behavioral: Negative.          Physical Exam:  /61   Pulse 116   Temp 98.4 °F (36.9 °C) (Oral)   Resp 24   SpO2 96%     Physical Exam  Vitals and nursing note reviewed.   Constitutional:       General: He is not in acute distress.     Appearance: Normal appearance. He is not ill-appearing, toxic-appearing or diaphoretic.   HENT:      Head: Normocephalic and atraumatic.      Mouth/Throat:      Mouth: Mucous membranes are moist.   Eyes:      Pupils: Pupils are equal, round, and reactive to light.   Cardiovascular:      Rate and Rhythm: Regular rhythm. Tachycardia present.      Pulses: Normal pulses.           Carotid pulses are 2+ on the right side and 2+ on the left side.       Radial pulses are 2+ on the right side and 2+ on the left side.        Femoral pulses are 2+ on the right side and 2+ on the left side.       Popliteal pulses are 2+ on the right side and 2+ on the left side.        Dorsalis pedis pulses are 2+ on the right side and 2+ on the left side.        Posterior tibial pulses are 2+ on the right side and 2+ on the left side.      Heart sounds: Normal heart sounds. No murmur heard.  Pulmonary:      Effort: Tachypnea and accessory muscle usage present. No respiratory distress or retractions.      Breath sounds: Examination of the right-upper field reveals decreased breath sounds and wheezing. Examination of the left-upper field reveals decreased breath sounds and wheezing. Examination of the right-middle field reveals decreased breath sounds and wheezing. Examination of the left-middle field reveals decreased breath sounds and wheezing. Examination of the right-lower field reveals decreased breath sounds and wheezing. Examination of the left-lower field reveals decreased breath  sounds and wheezing. Decreased breath sounds and wheezing present. No rhonchi or rales.   Chest:      Chest wall: No mass or tenderness.   Abdominal:      General: Abdomen is flat. There is no distension.      Palpations: Abdomen is soft. There is no mass or pulsatile mass.      Tenderness: There is no abdominal tenderness. There is no right CVA tenderness, left CVA tenderness, guarding or rebound.      Comments: No rigidity   Musculoskeletal:         General: No swelling, tenderness or deformity.      Cervical back: Neck supple. No tenderness.      Right lower leg: No tenderness. No edema.      Left lower leg: No tenderness. No edema.   Skin:     General: Skin is warm and dry.      Capillary Refill: Capillary refill takes less than 2 seconds.      Coloration: Skin is not jaundiced or pale.      Findings: No erythema.   Neurological:      General: No focal deficit present.      Mental Status: He is alert and oriented to person, place, and time. Mental status is at baseline.      Cranial Nerves: Cranial nerves 2-12 are intact. No cranial nerve deficit.      Sensory: Sensation is intact. No sensory deficit.      Motor: Motor function is intact. No weakness or pronator drift.      Coordination: Coordination is intact. Coordination normal.   Psychiatric:         Mood and Affect: Mood normal.         Behavior: Behavior normal.                  Procedures:  Procedures      Medical Decision Making:      Comorbidities that affect care:    COPD, depression, hypertension, anxiety, asthma, hyperlipidemia, GERD    External Notes reviewed:    None      The following orders were placed and all results were independently analyzed by me:  Orders Placed This Encounter   Procedures    Blood Culture - Blood,    Blood Culture - Blood,    COVID-19, FLU A/B, RSV PCR 1 HR TAT - Swab, Nasopharynx    XR Chest 1 View    [place] Draw    Comprehensive Metabolic Panel    BNP    High Sensitivity Troponin T    CBC Auto Differential    Lactic  Acid, Plasma    High Sensitivity Troponin T 1Hr    NPO Diet NPO Type: Strict NPO    Undress & Gown    Vital Signs    Undress & Gown    Continuous Pulse Oximetry    Vital Signs    Inpatient Hospitalist Consult    Oxygen Therapy- Nasal Cannula; Titrate 1-6 LPM Per SpO2; 90 - 95%    Oxygen Therapy- Nasal Cannula; Titrate 1-6 LPM Per SpO2; 90 - 95%    ECG 12 Lead ED Triage Standing Order; SOA    Insert Peripheral IV    Insert Peripheral IV    Inpatient Admission    CBC & Differential    Green Top (Gel)    Lavender Top    Gold Top - SST    Light Blue Top       Medications Given in the Emergency Department:  Medications   sodium chloride 0.9 % flush 10 mL (has no administration in time range)   sodium chloride 0.9 % flush 10 mL (has no administration in time range)   methylPREDNISolone sodium succinate (SOLU-Medrol) injection 125 mg (has no administration in time range)   ipratropium-albuterol (DUO-NEB) nebulizer solution 3 mL (3 mL Nebulization Given 1/25/25 0203)        ED Course:    ED Course as of 01/25/25 0325   Sat Jan 25, 2025   0211 EKG:    Rhythm: Sinus tachycardia  Rate: 127  Intervals: Normal MS and QT interval  T-wave: No pathological T wave  ST Segment: There is some respiratory variation but I do not see any obvious pathological ST elevation and reciprocal ST depression to suggest STEMI    EKG Comparison: Probably no change in the QRS and ST morphology from the EKG performed November 17, 2024.  There is sinus tachycardia present today    Interpreted by me   [SD]      ED Course User Index  [SD] Sacha Milton DO       Labs:    Lab Results (last 24 hours)       Procedure Component Value Units Date/Time    CBC & Differential [[id number]]  (Abnormal) Collected: 01/25/25 0245    Specimen: Blood from Arm, Left Updated: 01/25/25 0251    Narrative:      The following orders were created for panel order CBC & Differential.  Procedure                               Abnormality         Status                      ---------                               -----------         ------                     CBC Auto Differential[[id number]]        Abnormal            Final result                 Please view results for these tests on the individual orders.    Comprehensive Metabolic Panel [[id number]]  (Abnormal) Collected: 01/25/25 0245    Specimen: Blood from Arm, Left Updated: 01/25/25 0313     Glucose 98 mg/dL      BUN 14 mg/dL      Creatinine 1.62 mg/dL      Sodium 142 mmol/L      Potassium 3.5 mmol/L      Comment: Slight hemolysis detected by analyzer. Result may be falsely elevated.        Chloride 107 mmol/L      CO2 19.5 mmol/L      Calcium 8.7 mg/dL      Total Protein 6.1 g/dL      Albumin 3.8 g/dL      ALT (SGPT) 17 U/L      AST (SGOT) 20 U/L      Alkaline Phosphatase 120 U/L      Total Bilirubin 0.3 mg/dL      Globulin 2.3 gm/dL      A/G Ratio 1.7 g/dL      BUN/Creatinine Ratio 8.6     Anion Gap 15.5 mmol/L      eGFR 46.2 mL/min/1.73     Narrative:      GFR Categories in Chronic Kidney Disease (CKD)      GFR Category          GFR (mL/min/1.73)    Interpretation  G1                     90 or greater         Normal or high (1)  G2                      60-89                Mild decrease (1)  G3a                   45-59                Mild to moderate decrease  G3b                   30-44                Moderate to severe decrease  G4                    15-29                Severe decrease  G5                    14 or less           Kidney failure          (1)In the absence of evidence of kidney disease, neither GFR category G1 or G2 fulfill the criteria for CKD.    eGFR calculation 2021 CKD-EPI creatinine equation, which does not include race as a factor    BNP [[id number]]  (Normal) Collected: 01/25/25 0245    Specimen: Blood from Arm, Left Updated: 01/25/25 0311     proBNP 294.5 pg/mL     Narrative:      This assay is used as an aid in the diagnosis of individuals suspected of having heart failure. It can be used as an aid  in the diagnosis of acute decompensated heart failure (ADHF) in patients presenting with signs and symptoms of ADHF to the emergency department (ED). In addition, NT-proBNP of <300 pg/mL indicates ADHF is not likely.    Age Range Result Interpretation  NT-proBNP Concentration (pg/mL:      <50             Positive            >450                   Gray                 300-450                    Negative             <300    50-75           Positive            >900                  Gray                300-900                  Negative            <300      >75             Positive            >1800                  Gray                300-1800                  Negative            <300    High Sensitivity Troponin T [[id number]]  (Normal) Collected: 01/25/25 0245    Specimen: Blood from Arm, Left Updated: 01/25/25 0313     HS Troponin T 16 ng/L     Narrative:      High Sensitive Troponin T Reference Range:  <14.0 ng/L- Negative Female for AMI  <22.0 ng/L- Negative Male for AMI  >=14 - Abnormal Female indicating possible myocardial injury.  >=22 - Abnormal Male indicating possible myocardial injury.   Clinicians would have to utilize clinical acumen, EKG, Troponin, and serial changes to determine if it is an Acute Myocardial Infarction or myocardial injury due to an underlying chronic condition.         CBC Auto Differential [[id number]]  (Abnormal) Collected: 01/25/25 0245    Specimen: Blood from Arm, Left Updated: 01/25/25 0251     WBC 13.24 10*3/mm3      RBC 4.07 10*6/mm3      Hemoglobin 12.7 g/dL      Hematocrit 39.3 %      MCV 96.6 fL      MCH 31.2 pg      MCHC 32.3 g/dL      RDW 12.5 %      RDW-SD 44.2 fl      MPV 10.9 fL      Platelets 217 10*3/mm3      Neutrophil % 75.9 %      Lymphocyte % 13.5 %      Monocyte % 9.4 %      Eosinophil % 0.5 %      Basophil % 0.4 %      Immature Grans % 0.3 %      Neutrophils, Absolute 10.05 10*3/mm3      Lymphocytes, Absolute 1.79 10*3/mm3      Monocytes, Absolute 1.25 10*3/mm3       Eosinophils, Absolute 0.06 10*3/mm3      Basophils, Absolute 0.05 10*3/mm3      Immature Grans, Absolute 0.04 10*3/mm3      nRBC 0.0 /100 WBC     COVID-19, FLU A/B, RSV PCR 1 HR TAT - Swab, Nasopharynx [[id number]] Collected: 01/25/25 0245    Specimen: Swab from Nasopharynx Updated: 01/25/25 0248             Imaging:    XR Chest 1 View    Result Date: 1/25/2025  XR CHEST 1 VW-  Date of exam: 1/25/2025, 1:53 A.M.  Indication: SOA/SOB/shortness of air/shortness of breath  Comparison: 1/1/2025.  FINDINGS: A single AP (or PA) upright portable chest radiograph was performed. No cardiac enlargement is seen. No acute infiltrate is appreciated. No pleural effusion or pneumothorax is identified. The thoracic aorta is atherosclerotic. Chronic calcified granulomatous disease involves the chest. No significant interval change is seen since the prior study (or studies).       No acute infiltrate is appreciated.    Portions of this note were completed with a voice recognition program.  Electronically Signed By-Jayjay Varner MD On:1/25/2025 2:01 AM         Differential Diagnosis and Discussion:    Dyspnea: Differential diagnosis includes but is not limited to metabolic acidosis, neurological disorders, psychogenic, asthma, pneumothorax, upper airway obstruction, COPD, pneumonia, noncardiogenic pulmonary edema, interstitial lung disease, anemia, congestive heart failure, and pulmonary embolism    Labs were collected in the emergency department and all labs were reviewed and interpreted by me.  X-ray were performed in the emergency department and all X-ray impressions were independently interpreted by me.  An EKG was performed and the EKG was interpreted by me.    MDM  Number of Diagnoses or Management Options  Acute exacerbation of chronic obstructive pulmonary disease (COPD)  Hypoxia  Diagnosis management comments: The patient's CBC was reviewed and shows no abnormalities of critical concern.  Of note, there is no anemia requiring  a blood transfusion and the platelet count is acceptable    The patient's CMP was reviewed and shows no abnormalities of critical concern.  Of note, the patient's sodium and potassium are acceptable.  The patient's liver enzymes are unremarkable.  The patient's renal function including creatinine is preserved.  The patient has a normal anion gap.    The patient had a normal proBNP.  This makes acute decompensated heart failure unlikely    Patient's COVID flu and RSV detected only RSV.  I do feel this is causing the patient's acute bronchitis and COPD exacerbation  EKG demonstrated sinus tachycardia at a rate of 127.  There is no evidence of STEMI there was a marginal respiratory variation.    The patient's first-troponin 16.  Is repeated an hour later and returned at 20.  This is a delta of 4.  We will continue to monitor this in the hospital.  The patient is denying chest pain at this time      Chest x-ray was performed while in the emergency room.  The chest x-ray demonstrated no acute cardiopulmonary process    The patient was given 3 DuoNebs and Solu-Medrol in the emergency room.    The patient was reassessed.  The patient states that he has not significantly better.  He does appear to have less tachypnea and less accessory muscle use.  The patient is requiring 2 L of oxygen to keep his sats above 90.  Patient will subsequently admitted to the hospital for acute bronchitis secondary to RSV and COPD exacerbation       Amount and/or Complexity of Data Reviewed  Clinical lab tests: reviewed and ordered  Tests in the radiology section of CPT®: reviewed and ordered  Tests in the medicine section of CPT®: ordered and reviewed  Discuss the patient with other providers: yes (03:24 EST  I discussed the case with the hospitalist, Dr. Peterson.  We have discussed the patient's presenting symptoms, laboratory values, imaging and condition at the time of admission.  They will evaluate the patient in the emergency room and admit  the patient to the hospital)             Social Determinants of Health:    Patient is independent, reliable, and has access to care.       Disposition and Care Coordination:    Admit:   Through independent evaluation of the patient's history, physical, and imperical data, the patient meets criteria for inpatient admission to the hospital.        Final diagnoses:   Acute exacerbation of chronic obstructive pulmonary disease (COPD)   Hypoxia        ED Disposition       ED Disposition   Decision to Admit    Condition   --    Comment   Level of Care: Telemetry [5]   Diagnosis: COPD exacerbation [041153]   Certification: I Certify That Inpatient Hospital Services Are Medically Necessary For Greater Than 2 Midnights                 This medical record created using voice recognition software.             Sacha Milton DO  01/27/25 1529

## 2025-01-25 NOTE — PROGRESS NOTES
Patient seen and examined on this morning.  Chart has been reviewed as well.  RRT called secondary to the patient with plaints of chest pain and palpitations on this morning.  Was found to have sinus tachycardia with heart rates into the 110s.  He did receive a 2 L normal saline bolus.  Take trend on this morning is noted to be 3.1=>3.3=> 7.5.  Troponin T noted to be 12 down from 20 earlier this morning.  EKG did show sinus tachycardia with multiple PVCs.  No significant change from EKG on admission.  Will continue hydration with normal saline at 125 cc/h.  Continue to trend lactates.  Continue DuoNebs and IV Solu-Medrol.  Follow-up BMP and CBC in the a.m. as well.    Electronically signed by Kassidy Altman MD, 01/25/25, 4:02 PM EST.

## 2025-01-26 ENCOUNTER — APPOINTMENT (OUTPATIENT)
Dept: CARDIOLOGY | Facility: HOSPITAL | Age: 70
DRG: 191 | End: 2025-01-26
Payer: MEDICARE

## 2025-01-26 ENCOUNTER — APPOINTMENT (OUTPATIENT)
Dept: CT IMAGING | Facility: HOSPITAL | Age: 70
DRG: 191 | End: 2025-01-26
Payer: MEDICARE

## 2025-01-26 LAB
ANION GAP SERPL CALCULATED.3IONS-SCNC: 11.7 MMOL/L (ref 5–15)
BACTERIA SPEC RESP CULT: NORMAL
BASOPHILS # BLD AUTO: 0.01 10*3/MM3 (ref 0–0.2)
BASOPHILS NFR BLD AUTO: 0.1 % (ref 0–1.5)
BUN SERPL-MCNC: 21 MG/DL (ref 8–23)
BUN/CREAT SERPL: 16.9 (ref 7–25)
CALCIUM SPEC-SCNC: 8.3 MG/DL (ref 8.6–10.5)
CHLORIDE SERPL-SCNC: 111 MMOL/L (ref 98–107)
CO2 SERPL-SCNC: 17.3 MMOL/L (ref 22–29)
CREAT SERPL-MCNC: 1.24 MG/DL (ref 0.76–1.27)
D-LACTATE SERPL-SCNC: 2.2 MMOL/L (ref 0.5–2)
DEPRECATED RDW RBC AUTO: 44.9 FL (ref 37–54)
EGFRCR SERPLBLD CKD-EPI 2021: 63.7 ML/MIN/1.73
EOSINOPHIL # BLD AUTO: 0 10*3/MM3 (ref 0–0.4)
EOSINOPHIL NFR BLD AUTO: 0 % (ref 0.3–6.2)
ERYTHROCYTE [DISTWIDTH] IN BLOOD BY AUTOMATED COUNT: 12.7 % (ref 12.3–15.4)
GLUCOSE SERPL-MCNC: 139 MG/DL (ref 65–99)
GRAM STN SPEC: NORMAL
HCT VFR BLD AUTO: 37.4 % (ref 37.5–51)
HGB BLD-MCNC: 12 G/DL (ref 13–17.7)
IMM GRANULOCYTES # BLD AUTO: 0.09 10*3/MM3 (ref 0–0.05)
IMM GRANULOCYTES NFR BLD AUTO: 0.7 % (ref 0–0.5)
L PNEUMO1 AG UR QL IA: NEGATIVE
LYMPHOCYTES # BLD AUTO: 0.4 10*3/MM3 (ref 0.7–3.1)
LYMPHOCYTES NFR BLD AUTO: 3.1 % (ref 19.6–45.3)
M PNEUMO IGM SER QL: NEGATIVE
MCH RBC QN AUTO: 31.3 PG (ref 26.6–33)
MCHC RBC AUTO-ENTMCNC: 32.1 G/DL (ref 31.5–35.7)
MCV RBC AUTO: 97.4 FL (ref 79–97)
MONOCYTES # BLD AUTO: 0.67 10*3/MM3 (ref 0.1–0.9)
MONOCYTES NFR BLD AUTO: 5.2 % (ref 5–12)
NEUTROPHILS NFR BLD AUTO: 11.73 10*3/MM3 (ref 1.7–7)
NEUTROPHILS NFR BLD AUTO: 90.9 % (ref 42.7–76)
NRBC BLD AUTO-RTO: 0 /100 WBC (ref 0–0.2)
PLATELET # BLD AUTO: 185 10*3/MM3 (ref 140–450)
PMV BLD AUTO: 10.9 FL (ref 6–12)
POTASSIUM SERPL-SCNC: 3.9 MMOL/L (ref 3.5–5.2)
RBC # BLD AUTO: 3.84 10*6/MM3 (ref 4.14–5.8)
S PNEUM AG SPEC QL LA: NEGATIVE
SODIUM SERPL-SCNC: 140 MMOL/L (ref 136–145)
WBC NRBC COR # BLD AUTO: 12.9 10*3/MM3 (ref 3.4–10.8)

## 2025-01-26 PROCEDURE — 94799 UNLISTED PULMONARY SVC/PX: CPT

## 2025-01-26 PROCEDURE — 93306 TTE W/DOPPLER COMPLETE: CPT

## 2025-01-26 PROCEDURE — 85025 COMPLETE CBC W/AUTO DIFF WBC: CPT | Performed by: INTERNAL MEDICINE

## 2025-01-26 PROCEDURE — 71250 CT THORAX DX C-: CPT

## 2025-01-26 PROCEDURE — 87449 NOS EACH ORGANISM AG IA: CPT | Performed by: FAMILY MEDICINE

## 2025-01-26 PROCEDURE — 25010000002 HEPARIN (PORCINE) PER 1000 UNITS: Performed by: STUDENT IN AN ORGANIZED HEALTH CARE EDUCATION/TRAINING PROGRAM

## 2025-01-26 PROCEDURE — 99232 SBSQ HOSP IP/OBS MODERATE 35: CPT | Performed by: FAMILY MEDICINE

## 2025-01-26 PROCEDURE — 93306 TTE W/DOPPLER COMPLETE: CPT | Performed by: INTERNAL MEDICINE

## 2025-01-26 PROCEDURE — 63710000001 REVEFENACIN 175 MCG/3ML SOLUTION: Performed by: STUDENT IN AN ORGANIZED HEALTH CARE EDUCATION/TRAINING PROGRAM

## 2025-01-26 PROCEDURE — 83605 ASSAY OF LACTIC ACID: CPT | Performed by: INTERNAL MEDICINE

## 2025-01-26 PROCEDURE — 87205 SMEAR GRAM STAIN: CPT | Performed by: FAMILY MEDICINE

## 2025-01-26 PROCEDURE — 80048 BASIC METABOLIC PNL TOTAL CA: CPT | Performed by: INTERNAL MEDICINE

## 2025-01-26 PROCEDURE — 94761 N-INVAS EAR/PLS OXIMETRY MLT: CPT

## 2025-01-26 PROCEDURE — 87899 AGENT NOS ASSAY W/OPTIC: CPT | Performed by: FAMILY MEDICINE

## 2025-01-26 PROCEDURE — 94664 DEMO&/EVAL PT USE INHALER: CPT

## 2025-01-26 PROCEDURE — 25010000002 METHYLPREDNISOLONE PER 40 MG: Performed by: STUDENT IN AN ORGANIZED HEALTH CARE EDUCATION/TRAINING PROGRAM

## 2025-01-26 RX ORDER — DOXYCYCLINE 100 MG/1
100 CAPSULE ORAL EVERY 12 HOURS SCHEDULED
Status: DISCONTINUED | OUTPATIENT
Start: 2025-01-26 | End: 2025-01-28 | Stop reason: HOSPADM

## 2025-01-26 RX ADMIN — ARFORMOTEROL TARTRATE 15 MCG: 15 SOLUTION RESPIRATORY (INHALATION) at 06:57

## 2025-01-26 RX ADMIN — PANTOPRAZOLE SODIUM 40 MG: 40 TABLET, DELAYED RELEASE ORAL at 06:11

## 2025-01-26 RX ADMIN — BUDESONIDE 0.5 MG: 0.5 INHALANT RESPIRATORY (INHALATION) at 06:57

## 2025-01-26 RX ADMIN — BUDESONIDE 0.5 MG: 0.5 INHALANT RESPIRATORY (INHALATION) at 19:18

## 2025-01-26 RX ADMIN — HYDROCODONE POLISTIREX AND CHLORPHENIRAMINE POLISTIREX 5 ML: 10; 8 SUSPENSION, EXTENDED RELEASE ORAL at 21:23

## 2025-01-26 RX ADMIN — HEPARIN SODIUM 5000 UNITS: 5000 INJECTION INTRAVENOUS; SUBCUTANEOUS at 21:22

## 2025-01-26 RX ADMIN — WATER 40 MG: 1 INJECTION INTRAMUSCULAR; INTRAVENOUS; SUBCUTANEOUS at 03:14

## 2025-01-26 RX ADMIN — Medication 10 ML: at 09:13

## 2025-01-26 RX ADMIN — METOPROLOL SUCCINATE 25 MG: 25 TABLET, FILM COATED, EXTENDED RELEASE ORAL at 09:13

## 2025-01-26 RX ADMIN — WATER 40 MG: 1 INJECTION INTRAMUSCULAR; INTRAVENOUS; SUBCUTANEOUS at 12:36

## 2025-01-26 RX ADMIN — IPRATROPIUM BROMIDE AND ALBUTEROL SULFATE 3 ML: .5; 3 SOLUTION RESPIRATORY (INHALATION) at 19:18

## 2025-01-26 RX ADMIN — ALBUTEROL SULFATE 2.5 MG: 2.5 SOLUTION RESPIRATORY (INHALATION) at 03:42

## 2025-01-26 RX ADMIN — HEPARIN SODIUM 5000 UNITS: 5000 INJECTION INTRAVENOUS; SUBCUTANEOUS at 06:11

## 2025-01-26 RX ADMIN — WATER 40 MG: 1 INJECTION INTRAMUSCULAR; INTRAVENOUS; SUBCUTANEOUS at 21:22

## 2025-01-26 RX ADMIN — HYDROCODONE POLISTIREX AND CHLORPHENIRAMINE POLISTIREX 5 ML: 10; 8 SUSPENSION, EXTENDED RELEASE ORAL at 03:07

## 2025-01-26 RX ADMIN — REVEFENACIN 175 MCG: 175 SOLUTION RESPIRATORY (INHALATION) at 06:57

## 2025-01-26 RX ADMIN — DOXYCYCLINE 100 MG: 100 CAPSULE ORAL at 15:06

## 2025-01-26 RX ADMIN — GUAIFENESIN AND DEXTROMETHORPHAN HYDROBROMIDE 2 TABLET: 600; 30 TABLET, EXTENDED RELEASE ORAL at 21:23

## 2025-01-26 RX ADMIN — DOXYCYCLINE 100 MG: 100 CAPSULE ORAL at 21:22

## 2025-01-26 RX ADMIN — IPRATROPIUM BROMIDE AND ALBUTEROL SULFATE 3 ML: .5; 3 SOLUTION RESPIRATORY (INHALATION) at 06:57

## 2025-01-26 RX ADMIN — ARFORMOTEROL TARTRATE 15 MCG: 15 SOLUTION RESPIRATORY (INHALATION) at 19:18

## 2025-01-26 RX ADMIN — GUAIFENESIN AND DEXTROMETHORPHAN HYDROBROMIDE 2 TABLET: 600; 30 TABLET, EXTENDED RELEASE ORAL at 09:13

## 2025-01-26 RX ADMIN — IPRATROPIUM BROMIDE AND ALBUTEROL SULFATE 3 ML: .5; 3 SOLUTION RESPIRATORY (INHALATION) at 00:39

## 2025-01-26 RX ADMIN — IPRATROPIUM BROMIDE AND ALBUTEROL SULFATE 3 ML: .5; 3 SOLUTION RESPIRATORY (INHALATION) at 12:58

## 2025-01-26 RX ADMIN — Medication 10 ML: at 21:23

## 2025-01-26 RX ADMIN — HEPARIN SODIUM 5000 UNITS: 5000 INJECTION INTRAVENOUS; SUBCUTANEOUS at 15:06

## 2025-01-26 NOTE — PLAN OF CARE
Goal Outcome Evaluation:  Plan of Care Reviewed With: patient        Progress: improving  Outcome Evaluation: PT alert and oriented. Pt is on 3LNC, HR elevates and SOB with activity. Pt placed on bedrest today with bed alarm. Cough treated per MAR. Continue with plan of care.

## 2025-01-26 NOTE — PROGRESS NOTES
[facility]   Hospitalist Progress Note  Date: 2025  Patient Name: Jonathan Guerra Jr.  : 1955  MRN: [id number]  Date of admission: 2025      Subjective   Subjective     Chief complaint: Shortness of breath    Summary:  69-year-old male with history of hypertension, dyslipidemia, CKD stage IIIb, asthma, COPD, depression, hospitalized on 2025, for chief complaint of shortness of breath, being treated for COPD/asthma overlap exacerbation, placed on steroids, had an issue with chest pain, likely noncardiac, troponins flat.  Echo ordered. Positive for RSV.. Chest CT showing suggestions of bronchitis.  Added antibiotics.    Interval follow-up: Seen and examined, no distress, no major events overnight, no chest pain.  Down to 3 L, sats in the 90s.  Requiring up to 4 L.  Intermittently tachycardic.  Not seem to be volume overloaded.  Still wheezy and short of air.  Creatinine 1.24, BUN 21, bicarb 17, potassium 3.9, sodium 140.    Review of systems:  All systems reviewed and negative except weakness, fatigue, cough, shortness of breath    Objective   Objective     Vitals:   Temp:  [97.3 °F (36.3 °C)-98.1 °F (36.7 °C)] 97.3 °F (36.3 °C)  Heart Rate:  [] 105  Resp:  [19-26] 20  BP: (128-153)/(67-83) 128/70  Flow (L/min) (Oxygen Therapy):  [3-4] 4  Physical Exam               Constitutional: Awake, alert              Eyes: Pupils equal, sclerae anicteric, no conjunctival injection              HENT: NCAT, mucous membranes moist              Respiratory: Bilateral air entry present, significant wheezing diffusely bilaterally, mild respiratory distress              Cardiovascular: Regular, tachycardia, no murmurs              Gastrointestinal: soft, nontender, nondistended              Musculoskeletal: No bilateral ankle edema, no clubbing or cyanosis to extremities              Neurologic: Oriented x 3, speech clear              Skin: No rashes          Result Review    Result Review:  I have  personally reviewed the pertinent results from the past 24 hours to 1/26/2025 13:36 EST and agree with these findings:  [x]  Laboratory   CBC          1/1/2025    16:31 1/25/2025    02:45 1/25/2025    08:29 1/26/2025    04:45   CBC   WBC 24.42  13.24  13.38  12.90    RBC 4.24  4.07  4.24  3.84    Hemoglobin 13.5  12.7  13.3  12.0    Hematocrit 41.2  39.3  42.3  37.4    MCV 97.2  96.6  99.8  97.4    MCH 31.8  31.2  31.4  31.3    MCHC 32.8  32.3  31.4  32.1    RDW 12.7  12.5  12.7  12.7    Platelets 207  217  229  185      BMP          1/1/2025    16:31 1/25/2025    02:45 1/25/2025    08:29 1/26/2025    04:45   BMP   BUN 15  14  19  21    Creatinine 1.51  1.62  1.52  1.24    Sodium 142  142  142  140    Potassium 4.5  3.5  4.1  3.9    Chloride 108  107  106  111    CO2 21.9  19.5  15.9  17.3    Calcium 8.9  8.7  8.9  8.3      LIVER FUNCTION TESTS:      Lab 01/25/25  0829 01/25/25  0245   TOTAL PROTEIN 6.7 6.1   ALBUMIN 4.0 3.8   GLOBULIN 2.7 2.3   ALT (SGPT) 17 17   AST (SGOT) 23 20   BILIRUBIN 0.4 0.3   ALK PHOS 125* 120*       [x]  Microbiology   Microbiology Results (last 10 days)       Procedure Component Value - Date/Time    S. Pneumo Ag Urine or CSF - Urine, Urine, Clean Catch [[id number]]  (Normal) Collected: 01/26/25 0816    Lab Status: Final result Specimen: Urine, Clean Catch Updated: 01/26/25 0838     Strep Pneumo Ag Negative    Legionella Antigen, Urine - Urine, Urine, Clean Catch [[id number]]  (Normal) Collected: 01/26/25 0816    Lab Status: Final result Specimen: Urine, Clean Catch Updated: 01/26/25 0837     LEGIONELLA ANTIGEN, URINE Negative    Blood Culture - Blood, Arm, Right [[id number]]  (Normal) Collected: 01/25/25 0353    Lab Status: Preliminary result Specimen: Blood from Arm, Right Updated: 01/26/25 0400     Blood Culture No growth at 24 hours    Blood Culture - Blood, Arm, Left [[id number]]  (Normal) Collected: 01/25/25 0353    Lab Status: Preliminary result Specimen: Blood from Arm, Left Updated:  01/26/25 0400     Blood Culture No growth at 24 hours    COVID-19, FLU A/B, RSV PCR 1 HR TAT - Swab, Nasopharynx [[id number]]  (Abnormal) Collected: 01/25/25 0245    Lab Status: Final result Specimen: Swab from Nasopharynx Updated: 01/25/25 0343     COVID19 Not Detected     Influenza A PCR Not Detected     Influenza B PCR Not Detected     RSV, PCR Detected    Narrative:      Fact sheet for providers: https://www.fda.gov/media/177813/download    Fact sheet for patients: https://www.fda.gov/media/038461/download    Test performed by PCR.    Mycoplasma Pneumoniae Antibody, IgM - Blood, Arm, Left [[id number]]  (Normal) Collected: 01/25/25 0245    Lab Status: Final result Specimen: Blood from Arm, Left Updated: 01/26/25 0759     Mycoplasma pneumo IgM Negative              [x]  Radiology CT Chest Without Contrast Diagnostic    Result Date: 1/26/2025  Impression: 1.Findings suggestive of bronchitis without consolidation. Other stable chronic findings. Electronically Signed: Shreyas Fernandez MD  1/26/2025 9:39 AM EST  Workstation ID: [id number]    XR Chest 1 View    Result Date: 1/25/2025  No acute infiltrate is appreciated.    Portions of this note were completed with a voice recognition program.  Electronically Signed By-Jayjay Varner MD On:1/25/2025 2:01 AM         [x]  EKG/Telemetry   ECG 12 Lead Chest Pain   Final Result   HEART YEBZ=666  bpm   RR Xeqpkvbq=713  ms   MN Qxrdhuek=814  ms   P Horizontal Axis=30  deg   P Front Axis=75  deg   QRSD Interval=77  ms   QT Uynchmum=862  ms   ASvQ=794  ms   QRS Axis=72  deg   T Wave Axis=12  deg   - ABNORMAL ECG -   Sinus tachycardia   Multiform ventricular premature complexes   Borderline  repolarization abnormality   When compared with ECG of 25-Jan-2025 01:52:46,   No significant change   Electronically Signed By: Vinayak Campbell ([facility]) 2025-01-25 14:52:32   Date and Time of Study:2025-01-25 10:29:37      ECG 12 Lead ED Triage Standing Order; SOA   Final Result   HEART WSBL=942  bpm    RR Rlcyebth=144  ms   MS Atccqshq=164  ms   P Horizontal Axis=34  deg   P Front Axis=79  deg   QRSD Interval=71  ms   QT Zsiqsowv=447  ms   UVqS=192  ms   QRS Axis=71  deg   T Wave Axis=47  deg   - ABNORMAL ECG -   Sinus tachycardia   Multiform ventricular premature complexes   Borderline  ST depression, anterolateral leads   When compared with ECG of 17-Nov-2024 10:25:13,   Significant rate increase   Electronically Signed By: Vinayak Campbell ([facility]) 2025-01-25 14:52:40   Date and Time of Study:2025-01-25 01:52:46          []  Cardiology/Vascular   []  Pathology  [x]  Old records  []  Other:    Assessment & Plan   Assessment / Plan     Assessment/Plan:  Assessment:  COPD/asthma exacerbation  RSV Bronchitis  Hypertension  Hyperlipidemia  CKD stage IIIb  History of lung nodules  Depression  Acute bronchitis.  GERD without esophagitis    Plan:  Labs and imaging reviewed  Start c doxycycline 100 mg twice a day  Follow-up echo  Continue Solu-Medrol 40 mg IV every 8 hours  Continue metoprolol 25 mg XL daily  Continue Protonix 40 mg daily  Continue Brovana Pulmicort nebs twice daily  Continue Yupelri nebs daily  Continue DuoNebs every 6 hours  Continue Mucinex  Wean oxygen per tolerance  A.m. labs  Full code  DVT prophylaxis with heparin  Clinical course dictate further management  Discussed with nurse at the bedside    VTE Prophylaxis:  Pharmacologic VTE prophylaxis orders are present.        CODE STATUS:   Level Of Support Discussed With: Patient  Code Status (Patient has no pulse and is not breathing): CPR (Attempt to Resuscitate)  Medical Interventions (Patient has pulse or is breathing): Full Support        Electronically signed by Shaila Cabello MD, 1/26/2025, 13:36 EST.    Portions of this documentation were transcribed electronically from a voice recognition software.  I confirm all data accurately represents the service(s) I performed at today's visit.

## 2025-01-27 LAB
ALBUMIN SERPL-MCNC: 3.3 G/DL (ref 3.5–5.2)
ALP SERPL-CCNC: 86 U/L (ref 39–117)
ALT SERPL W P-5'-P-CCNC: 17 U/L (ref 1–41)
ANION GAP SERPL CALCULATED.3IONS-SCNC: 9 MMOL/L (ref 5–15)
AST SERPL-CCNC: 23 U/L (ref 1–40)
AV MEAN PRESS GRAD SYS DOP V1V2: 5 MMHG
AV VMAX SYS DOP: 147 CM/SEC
BASOPHILS # BLD AUTO: 0.01 10*3/MM3 (ref 0–0.2)
BASOPHILS NFR BLD AUTO: 0.1 % (ref 0–1.5)
BH CV ECHO MEAS - AO MAX PG: 8.6 MMHG
BH CV ECHO MEAS - AO ROOT DIAM: 2.9 CM
BH CV ECHO MEAS - AO V2 VTI: 23.5 CM
BH CV ECHO MEAS - AVA(I,D): 2.26 CM2
BH CV ECHO MEAS - EDV(CUBED): 79.5 ML
BH CV ECHO MEAS - EDV(MOD-SP2): 39.4 ML
BH CV ECHO MEAS - EDV(MOD-SP4): 53.6 ML
BH CV ECHO MEAS - EF(MOD-SP2): 57.1 %
BH CV ECHO MEAS - EF(MOD-SP4): 65.3 %
BH CV ECHO MEAS - ESV(CUBED): 13.8 ML
BH CV ECHO MEAS - ESV(MOD-SP2): 16.9 ML
BH CV ECHO MEAS - ESV(MOD-SP4): 18.6 ML
BH CV ECHO MEAS - FS: 44.2 %
BH CV ECHO MEAS - IVS/LVPW: 1.13 CM
BH CV ECHO MEAS - IVSD: 0.9 CM
BH CV ECHO MEAS - LA DIMENSION: 3.5 CM
BH CV ECHO MEAS - LAT PEAK E' VEL: 10.6 CM/SEC
BH CV ECHO MEAS - LV DIASTOLIC VOL/BSA (35-75): 30 CM2
BH CV ECHO MEAS - LV MASS(C)D: 114.2 GRAMS
BH CV ECHO MEAS - LV MAX PG: 4.4 MMHG
BH CV ECHO MEAS - LV MEAN PG: 2 MMHG
BH CV ECHO MEAS - LV SYSTOLIC VOL/BSA (12-30): 10.4 CM2
BH CV ECHO MEAS - LV V1 MAX: 105 CM/SEC
BH CV ECHO MEAS - LV V1 VTI: 16.9 CM
BH CV ECHO MEAS - LVIDD: 4.3 CM
BH CV ECHO MEAS - LVIDS: 2.4 CM
BH CV ECHO MEAS - LVOT AREA: 3.1 CM2
BH CV ECHO MEAS - LVOT DIAM: 2 CM
BH CV ECHO MEAS - LVPWD: 0.8 CM
BH CV ECHO MEAS - MED PEAK E' VEL: 11 CM/SEC
BH CV ECHO MEAS - MV A MAX VEL: 75.3 CM/SEC
BH CV ECHO MEAS - MV DEC SLOPE: 602 CM/SEC2
BH CV ECHO MEAS - MV E MAX VEL: 94.2 CM/SEC
BH CV ECHO MEAS - MV E/A: 1.25
BH CV ECHO MEAS - MV MEAN PG: 2 MMHG
BH CV ECHO MEAS - MV P1/2T: 44.8 MSEC
BH CV ECHO MEAS - MV V2 VTI: 16.6 CM
BH CV ECHO MEAS - MVA(P1/2T): 4.9 CM2
BH CV ECHO MEAS - MVA(VTI): 3.2 CM2
BH CV ECHO MEAS - RVDD: 2.6 CM
BH CV ECHO MEAS - SV(LVOT): 53.1 ML
BH CV ECHO MEAS - SV(MOD-SP2): 22.5 ML
BH CV ECHO MEAS - SV(MOD-SP4): 35 ML
BH CV ECHO MEAS - SVI(LVOT): 29.7 ML/M2
BH CV ECHO MEAS - SVI(MOD-SP2): 12.6 ML/M2
BH CV ECHO MEAS - SVI(MOD-SP4): 19.6 ML/M2
BH CV ECHO MEAS - TAPSE (>1.6): 2.33 CM
BH CV ECHO MEAS - TR MAX PG: 33.4 MMHG
BH CV ECHO MEAS - TR MAX VEL: 289 CM/SEC
BH CV ECHO MEASUREMENTS AVERAGE E/E' RATIO: 8.72
BILIRUB CONJ SERPL-MCNC: 0.1 MG/DL (ref 0–0.3)
BILIRUB INDIRECT SERPL-MCNC: 0.1 MG/DL
BILIRUB SERPL-MCNC: 0.2 MG/DL (ref 0–1.2)
BUN SERPL-MCNC: 26 MG/DL (ref 8–23)
BUN/CREAT SERPL: 19.4 (ref 7–25)
CALCIUM SPEC-SCNC: 8.6 MG/DL (ref 8.6–10.5)
CHLORIDE SERPL-SCNC: 108 MMOL/L (ref 98–107)
CO2 SERPL-SCNC: 18 MMOL/L (ref 22–29)
CREAT SERPL-MCNC: 1.34 MG/DL (ref 0.76–1.27)
DEPRECATED RDW RBC AUTO: 45.1 FL (ref 37–54)
EGFRCR SERPLBLD CKD-EPI 2021: 58.1 ML/MIN/1.73
EOSINOPHIL # BLD AUTO: 0 10*3/MM3 (ref 0–0.4)
EOSINOPHIL NFR BLD AUTO: 0 % (ref 0.3–6.2)
ERYTHROCYTE [DISTWIDTH] IN BLOOD BY AUTOMATED COUNT: 12.6 % (ref 12.3–15.4)
GLUCOSE SERPL-MCNC: 134 MG/DL (ref 65–99)
HCT VFR BLD AUTO: 38.4 % (ref 37.5–51)
HGB BLD-MCNC: 12.1 G/DL (ref 13–17.7)
IMM GRANULOCYTES # BLD AUTO: 0.12 10*3/MM3 (ref 0–0.05)
IMM GRANULOCYTES NFR BLD AUTO: 0.8 % (ref 0–0.5)
LEFT ATRIUM VOLUME INDEX: 23 ML/M2
LV EF BIPLANE MOD: 63.7 %
LYMPHOCYTES # BLD AUTO: 0.72 10*3/MM3 (ref 0.7–3.1)
LYMPHOCYTES NFR BLD AUTO: 4.6 % (ref 19.6–45.3)
MAGNESIUM SERPL-MCNC: 2.3 MG/DL (ref 1.6–2.4)
MCH RBC QN AUTO: 30.9 PG (ref 26.6–33)
MCHC RBC AUTO-ENTMCNC: 31.5 G/DL (ref 31.5–35.7)
MCV RBC AUTO: 98.2 FL (ref 79–97)
MONOCYTES # BLD AUTO: 0.81 10*3/MM3 (ref 0.1–0.9)
MONOCYTES NFR BLD AUTO: 5.2 % (ref 5–12)
NEUTROPHILS NFR BLD AUTO: 13.92 10*3/MM3 (ref 1.7–7)
NEUTROPHILS NFR BLD AUTO: 89.3 % (ref 42.7–76)
NRBC BLD AUTO-RTO: 0 /100 WBC (ref 0–0.2)
PHOSPHATE SERPL-MCNC: 3.1 MG/DL (ref 2.5–4.5)
PLATELET # BLD AUTO: 201 10*3/MM3 (ref 140–450)
PMV BLD AUTO: 11.1 FL (ref 6–12)
POTASSIUM SERPL-SCNC: 3.9 MMOL/L (ref 3.5–5.2)
PROT SERPL-MCNC: 5.5 G/DL (ref 6–8.5)
RBC # BLD AUTO: 3.91 10*6/MM3 (ref 4.14–5.8)
SODIUM SERPL-SCNC: 135 MMOL/L (ref 136–145)
WBC NRBC COR # BLD AUTO: 15.58 10*3/MM3 (ref 3.4–10.8)

## 2025-01-27 PROCEDURE — 80076 HEPATIC FUNCTION PANEL: CPT | Performed by: FAMILY MEDICINE

## 2025-01-27 PROCEDURE — 94799 UNLISTED PULMONARY SVC/PX: CPT

## 2025-01-27 PROCEDURE — 25010000002 HEPARIN (PORCINE) PER 1000 UNITS: Performed by: STUDENT IN AN ORGANIZED HEALTH CARE EDUCATION/TRAINING PROGRAM

## 2025-01-27 PROCEDURE — 83735 ASSAY OF MAGNESIUM: CPT | Performed by: FAMILY MEDICINE

## 2025-01-27 PROCEDURE — 85025 COMPLETE CBC W/AUTO DIFF WBC: CPT | Performed by: FAMILY MEDICINE

## 2025-01-27 PROCEDURE — 25010000002 METHYLPREDNISOLONE PER 40 MG: Performed by: STUDENT IN AN ORGANIZED HEALTH CARE EDUCATION/TRAINING PROGRAM

## 2025-01-27 PROCEDURE — 94664 DEMO&/EVAL PT USE INHALER: CPT

## 2025-01-27 PROCEDURE — 99222 1ST HOSP IP/OBS MODERATE 55: CPT | Performed by: INTERNAL MEDICINE

## 2025-01-27 PROCEDURE — 94761 N-INVAS EAR/PLS OXIMETRY MLT: CPT

## 2025-01-27 PROCEDURE — 80048 BASIC METABOLIC PNL TOTAL CA: CPT | Performed by: FAMILY MEDICINE

## 2025-01-27 PROCEDURE — 99232 SBSQ HOSP IP/OBS MODERATE 35: CPT | Performed by: FAMILY MEDICINE

## 2025-01-27 PROCEDURE — 84100 ASSAY OF PHOSPHORUS: CPT | Performed by: FAMILY MEDICINE

## 2025-01-27 PROCEDURE — 63710000001 REVEFENACIN 175 MCG/3ML SOLUTION: Performed by: STUDENT IN AN ORGANIZED HEALTH CARE EDUCATION/TRAINING PROGRAM

## 2025-01-27 RX ORDER — PREDNISONE 20 MG/1
40 TABLET ORAL
Status: DISCONTINUED | OUTPATIENT
Start: 2025-01-28 | End: 2025-01-28 | Stop reason: HOSPADM

## 2025-01-27 RX ADMIN — IPRATROPIUM BROMIDE AND ALBUTEROL SULFATE 3 ML: .5; 3 SOLUTION RESPIRATORY (INHALATION) at 19:18

## 2025-01-27 RX ADMIN — WATER 40 MG: 1 INJECTION INTRAMUSCULAR; INTRAVENOUS; SUBCUTANEOUS at 05:48

## 2025-01-27 RX ADMIN — REVEFENACIN 175 MCG: 175 SOLUTION RESPIRATORY (INHALATION) at 06:44

## 2025-01-27 RX ADMIN — DOXYCYCLINE 100 MG: 100 CAPSULE ORAL at 21:23

## 2025-01-27 RX ADMIN — ARFORMOTEROL TARTRATE 15 MCG: 15 SOLUTION RESPIRATORY (INHALATION) at 06:44

## 2025-01-27 RX ADMIN — BUDESONIDE 0.5 MG: 0.5 INHALANT RESPIRATORY (INHALATION) at 06:44

## 2025-01-27 RX ADMIN — WATER 40 MG: 1 INJECTION INTRAMUSCULAR; INTRAVENOUS; SUBCUTANEOUS at 13:24

## 2025-01-27 RX ADMIN — HEPARIN SODIUM 5000 UNITS: 5000 INJECTION INTRAVENOUS; SUBCUTANEOUS at 05:48

## 2025-01-27 RX ADMIN — METOPROLOL SUCCINATE 25 MG: 25 TABLET, FILM COATED, EXTENDED RELEASE ORAL at 09:42

## 2025-01-27 RX ADMIN — Medication 10 ML: at 21:24

## 2025-01-27 RX ADMIN — BUDESONIDE 0.5 MG: 0.5 INHALANT RESPIRATORY (INHALATION) at 19:18

## 2025-01-27 RX ADMIN — HEPARIN SODIUM 5000 UNITS: 5000 INJECTION INTRAVENOUS; SUBCUTANEOUS at 21:23

## 2025-01-27 RX ADMIN — IPRATROPIUM BROMIDE AND ALBUTEROL SULFATE 3 ML: .5; 3 SOLUTION RESPIRATORY (INHALATION) at 06:44

## 2025-01-27 RX ADMIN — Medication 10 ML: at 09:42

## 2025-01-27 RX ADMIN — DOXYCYCLINE 100 MG: 100 CAPSULE ORAL at 09:42

## 2025-01-27 RX ADMIN — GUAIFENESIN AND DEXTROMETHORPHAN HYDROBROMIDE 2 TABLET: 600; 30 TABLET, EXTENDED RELEASE ORAL at 09:42

## 2025-01-27 RX ADMIN — HEPARIN SODIUM 5000 UNITS: 5000 INJECTION INTRAVENOUS; SUBCUTANEOUS at 13:23

## 2025-01-27 RX ADMIN — ARFORMOTEROL TARTRATE 15 MCG: 15 SOLUTION RESPIRATORY (INHALATION) at 19:18

## 2025-01-27 RX ADMIN — IPRATROPIUM BROMIDE AND ALBUTEROL SULFATE 3 ML: .5; 3 SOLUTION RESPIRATORY (INHALATION) at 00:26

## 2025-01-27 RX ADMIN — IPRATROPIUM BROMIDE AND ALBUTEROL SULFATE 3 ML: .5; 3 SOLUTION RESPIRATORY (INHALATION) at 13:14

## 2025-01-27 RX ADMIN — GUAIFENESIN AND DEXTROMETHORPHAN HYDROBROMIDE 2 TABLET: 600; 30 TABLET, EXTENDED RELEASE ORAL at 21:23

## 2025-01-27 RX ADMIN — PANTOPRAZOLE SODIUM 40 MG: 40 TABLET, DELAYED RELEASE ORAL at 05:49

## 2025-01-27 NOTE — CONSULTS
Pulmonary / Critical Care Consult Note      Patient Name: Jonathan Guerra Jr.  : 1955  MRN: [id number]  Primary Care Physician:  Ruel Hung MD  Referring Physician: Sacha Milton DO  Date of admission: 2025    Subjective   Subjective     Reason for Consult/ Chief Complaint: COPD exacerbation, history of pulmonary nodules    HPI:  Jonathan Guerra Jr. is a 69 y.o. male with past medical history of hypertension, CKD stage IIIb, asthma, COPD, multiple pulmonary nodules and history of tobacco abuse of cigarettes who presented to the emergency department for shortness of breath x 1 day.  Patient had been using home nebulizer treatments and 20 mg of prednisone for a previous prescription without any improvement.  In the emergency department, chest x-ray revealed no acute infiltrate.  RSV PCR was positive.  Patient was then admitted to hospitalist service for COPD/asthma exacerbation and RSV infection.  CT of chest was then obtained revealing findings suggestive of bronchitis with stable benign 5 mm right and left apical pulmonary nodules.  Pulmonology was then consulted for further evaluation.  Upon exam, patient was noted to be resting comfortably in bed on 2 L nasal cannula with 96% SpO2.  Patient reports that he has had history of pulmonary nodules have been unchanged for several years.  He reports he has had a congested cough with scant green sputum.  Does not use any maintenance inhalers at baseline.  Reports using albuterol inhaler at home without any improvement in symptoms.  Denies any fever.  No known sick contact exposure.  Denies chest pain, pressure or hemoptysis.  History of tobacco abuse of cigarettes.    Review of Systems:  All systems were reviewed and negative except as above    Personal History     Past Medical History:   Diagnosis Date    Acid reflux     Acid reflux disease     Anemia, unspecified     Anxiety     Arthritis     Asthma     Broken bones     Calcaneus fracture, left      Chronic bronchitis     COPD (chronic obstructive pulmonary disease)     Depression     Diverticulitis     Emphysema lung     Head injury     skull fracture     Hernia cerebri     HTN (hypertension)     Hyperlipemia     Lung disease        Past Surgical History:   Procedure Laterality Date    COLONOSCOPY  2014, 2019    ENDOSCOPY         Family History: family history includes Colon cancer in his mother and paternal grandmother; Diabetes in his sister; Heart disease in his mother; Other in his mother. Otherwise pertinent FHx was reviewed and not pertinent to current issue.    Social History:  reports that he quit smoking about 19 years ago. His smoking use included cigarettes. He started smoking about 59 years ago. He has a 60 pack-year smoking history. He has been exposed to tobacco smoke. He has never used smokeless tobacco. He reports current alcohol use. He reports that he does not use drugs.    Home Medications:  albuterol sulfate HFA, diclofenac, levocetirizine, metoprolol succinate XL, and omeprazole    Allergies:  Allergies   Allergen Reactions    Azithromycin Unknown - High Severity    Codeine Unknown - High Severity    Penicillins Unknown - High Severity       Objective    Objective     Vitals:   Temp:  [97.3 °F (36.3 °C)-98 °F (36.7 °C)] 97.3 °F (36.3 °C)  Heart Rate:  [] 107  Resp:  [16-22] 16  BP: (117-154)/(69-81) 141/73  Flow (L/min) (Oxygen Therapy):  [2-4] 2    Physical Exam:  Chronically ill in appearance  Currently on 2 L of oxygen  Good aeration  Faint wheezing      Result Review    Result Review:  I have personally reviewed the results from the time of this admission to 1/27/2025 11:50 EST and agree with these findings:  [x]  Laboratory  [x]  Microbiology  [x]  Radiology  [x]  EKG/Telemetry   []  Cardiology/Vascular   []  Pathology  []  Old records  []  Other:  Most notable findings include:       Lab 01/27/25  0531 01/26/25  0445 01/25/25  0829 01/25/25  0245   WBC 15.58* 12.90* 13.38*  13.24*   HEMOGLOBIN 12.1* 12.0* 13.3 12.7*   HEMATOCRIT 38.4 37.4* 42.3 39.3   PLATELETS 201 185 229 217   SODIUM 135* 140 142 142   POTASSIUM 3.9 3.9 4.1 3.5   CHLORIDE 108* 111* 106 107   CO2 18.0* 17.3* 15.9* 19.5*   BUN 26* 21 19 14   CREATININE 1.34* 1.24 1.52* 1.62*   GLUCOSE 134* 139* 151* 98   CALCIUM 8.6 8.3* 8.9 8.7   PHOSPHORUS 3.1  --  3.2  --    TOTAL PROTEIN 5.5*  --  6.7 6.1   ALBUMIN 3.3*  --  4.0 3.8   GLOBULIN  --   --  2.7 2.3     CT Chest Without Contrast Diagnostic    Result Date: 1/26/2025  CT CHEST WO CONTRAST DIAGNOSTIC Date of Exam: 1/26/2025 9:25 AM EST Indication: pneumonia worsening hypoxemia. Comparison: 6/23/2023 Technique: Axial CT images were obtained of the chest without contrast administration.  Reconstructed coronal and sagittal images were also obtained. Automated exposure control and iterative construction methods were used. Findings: MEDIASTINUM: Small sliding hiatal hernia. Aortic and heart size are normal. No mass nor pericardial effusion. CORONARY ARTERIES: There is calcified atherosclerotic disease. LUNGS: There is bilateral bronchial wall thickening most pronounced in the right lung with some areas of scattered endobronchial fluid/secretions. Correlate for bronchitis. No consolidation. Stable benign 5 mm right and left apical nodules. No suspicious  nodule. PLEURAL SPACE: No effusion, mass, nor pneumothorax. LYMPH NODES: There are no pathologically enlarged lymph nodes. UPPER ABDOMEN: Unremarkable OSSEOUS STRUCTURES: Appropriate for age with no acute process identified.     Impression: Impression: 1.Findings suggestive of bronchitis without consolidation. Other stable chronic findings. Electronically Signed: Shreyas Fernandez MD  1/26/2025 9:39 AM EST  Workstation ID: [id number]     Assessment & Plan   Assessment / Plan     Active Hospital Problems:  Active Hospital Problems    Diagnosis     **COPD exacerbation      Impression:   Acute respiratory failure with hypoxia  Acute  COPD/asthma exacerbation  RSV bronchiolitis  Stable pulmonary nodule  Acute hypoxic respiratory failure  Exacerbation of COPD/asthma overlap  RSV bronchitis  Stable pulmonary nodules  CKD stage IIIb  History of tobacco abuse of cigarettes      Plan:   CT scan reviewed  Patient with stable pulmonary nodule 5 mm  Based on Fleischner Society guidelines would recommend a CT follow-up in 1 year  Continue doxycycline  Discontinue IV Solu-Medrol  Changed to p.o. prednisone  Continue Brovana Pulmicort and DuoNebs  Patient is improving  Do not feel patient needs bronchoscopy at this time  Will need to follow-up in the COPD/pulmonary clinic in 1 to 2 weeks after discharge      Electronically signed by Mirza Lozano DO, 01/27/25, 11:50 AM EST.    Electronically signed by MALLORY Tanner, 01/27/25, 4:56 PM EST.  I personally seen  examined this patient and the medical decision making and assessment and plan reflect my and I assume responsibility for the care of this patient      Electronically signed by Mirza Lozano DO, 01/27/25, 6:14 PM EST.

## 2025-01-27 NOTE — PROGRESS NOTES
[facility]   Hospitalist Progress Note  Date: 2025  Patient Name: Jonathan Guerra Jr.  : 1955  MRN: [id number]  Date of admission: 2025      Subjective   Subjective     Chief complaint: Shortness of breath    Summary:  69-year-old male with history of hypertension, dyslipidemia, CKD stage IIIb, asthma, COPD, depression, hospitalized on 2025, for chief complaint of shortness of breath, being treated for COPD/asthma overlap exacerbation, placed on steroids, had an issue with chest pain, likely noncardiac, troponins flat.  Echo ordered. Positive for RSV.. Chest CT showing suggestions of bronchitis.  Added antibiotics.  Failed to improve in 48 hours, pulmonary consulted for further evaluation.  Patient has missed several appointments with them as outpatient.    Interval follow-up: Seen and examined, no distress, no major events overnight, no chest pain.  Still coughing short of air, has difficulty liberating mucus.  White blood cell count 15,000, rising, creatinine 12.1, creatinine 1.34, BUN 26, bicarb 18, potassium 3.9, sodium 135.  Echo shows EF 63%.  There is also concern patient has elevated right-sided pressures.  Chest x-ray shows bronchitis without consolidation.  Pulmonary consulted for further evaluation.    Review of systems:  All systems reviewed and negative except weakness, fatigue, cough, shortness of breath, hypoxemia    Objective   Objective     Vitals:   Temp:  [97.3 °F (36.3 °C)-98 °F (36.7 °C)] 97.3 °F (36.3 °C)  Heart Rate:  [] 107  Resp:  [16-22] 16  BP: (117-154)/(69-81) 141/73  Flow (L/min) (Oxygen Therapy):  [2-4] 2  Physical Exam                 Constitutional: Awake, alert              Eyes: Pupils equal, sclerae anicteric, no conjunctival injection              HENT: NCAT, mucous membranes moist              Respiratory: Bilateral air entry present, diffuse wheezing diffusely bilaterally, mild respiratory distress              Cardiovascular: Regular,  tachycardia, no murmurs              Gastrointestinal: soft, nontender, nondistended              Musculoskeletal: No bilateral ankle edema, no clubbing or cyanosis to extremities              Neurologic: Oriented x 3, speech clear              Skin: No rashes      Result Review    Result Review:  I have personally reviewed the pertinent results from the past 24 hours to 1/27/2025 11:33 EST and agree with these findings:  [x]  Laboratory   CBC          1/25/2025    02:45 1/25/2025    08:29 1/26/2025    04:45 1/27/2025    05:31   CBC   WBC 13.24  13.38  12.90  15.58    RBC 4.07  4.24  3.84  3.91    Hemoglobin 12.7  13.3  12.0  12.1    Hematocrit 39.3  42.3  37.4  38.4    MCV 96.6  99.8  97.4  98.2    MCH 31.2  31.4  31.3  30.9    MCHC 32.3  31.4  32.1  31.5    RDW 12.5  12.7  12.7  12.6    Platelets 217  229  185  201      BMP          1/25/2025    02:45 1/25/2025    08:29 1/26/2025    04:45 1/27/2025    05:31   BMP   BUN 14  19  21  26    Creatinine 1.62  1.52  1.24  1.34    Sodium 142  142  140  135    Potassium 3.5  4.1  3.9  3.9    Chloride 107  106  111  108    CO2 19.5  15.9  17.3  18.0    Calcium 8.7  8.9  8.3  8.6      LIVER FUNCTION TESTS:      Lab 01/27/25  0531 01/25/25  0829 01/25/25  0245   TOTAL PROTEIN 5.5* 6.7 6.1   ALBUMIN 3.3* 4.0 3.8   GLOBULIN  --  2.7 2.3   ALT (SGPT) 17 17 17   AST (SGOT) 23 23 20   BILIRUBIN 0.2 0.4 0.3   INDIRECT BILIRUBIN 0.1  --   --    BILIRUBIN DIRECT 0.1  --   --    ALK PHOS 86 125* 120*       [x]  Microbiology   Microbiology Results (last 10 days)       Procedure Component Value - Date/Time    Respiratory Culture - Sputum, Cough [[id number]] Collected: 01/26/25 1138    Lab Status: Final result Specimen: Sputum from Cough Updated: 01/26/25 1401     Respiratory Culture Rejected     Gram Stain Moderate (3+) Epithelial cells per low power field      Rare (1+) WBCs per low power field      Few (2+) Gram positive cocci in pairs, chains and clusters      Few (2+) Gram positive  bacilli      Few (2+) Gram negative bacilli    Narrative:      Specimen rejected due to oropharyngeal contamination. Please reorder and recollect specimen if clinically necessary.    S. Pneumo Ag Urine or CSF - Urine, Urine, Clean Catch [[id number]]  (Normal) Collected: 01/26/25 0816    Lab Status: Final result Specimen: Urine, Clean Catch Updated: 01/26/25 0838     Strep Pneumo Ag Negative    Legionella Antigen, Urine - Urine, Urine, Clean Catch [[id number]]  (Normal) Collected: 01/26/25 0816    Lab Status: Final result Specimen: Urine, Clean Catch Updated: 01/26/25 0837     LEGIONELLA ANTIGEN, URINE Negative    Blood Culture - Blood, Arm, Right [[id number]]  (Normal) Collected: 01/25/25 0353    Lab Status: Preliminary result Specimen: Blood from Arm, Right Updated: 01/27/25 0400     Blood Culture No growth at 2 days    Blood Culture - Blood, Arm, Left [[id number]]  (Normal) Collected: 01/25/25 0353    Lab Status: Preliminary result Specimen: Blood from Arm, Left Updated: 01/27/25 0400     Blood Culture No growth at 2 days    COVID-19, FLU A/B, RSV PCR 1 HR TAT - Swab, Nasopharynx [[id number]]  (Abnormal) Collected: 01/25/25 0245    Lab Status: Final result Specimen: Swab from Nasopharynx Updated: 01/25/25 0343     COVID19 Not Detected     Influenza A PCR Not Detected     Influenza B PCR Not Detected     RSV, PCR Detected    Narrative:      Fact sheet for providers: https://www.fda.gov/media/578497/download    Fact sheet for patients: https://www.fda.gov/media/311177/download    Test performed by PCR.    Mycoplasma Pneumoniae Antibody, IgM - Blood, Arm, Left [[id number]]  (Normal) Collected: 01/25/25 0245    Lab Status: Final result Specimen: Blood from Arm, Left Updated: 01/26/25 0759     Mycoplasma pneumo IgM Negative              [x]  Radiology CT Chest Without Contrast Diagnostic    Result Date: 1/26/2025  Impression: 1.Findings suggestive of bronchitis without consolidation. Other stable chronic findings.  Electronically Signed: Shreyas Fernandez MD  1/26/2025 9:39 AM EST  Workstation ID: [id number]    XR Chest 1 View    Result Date: 1/25/2025  No acute infiltrate is appreciated.    Portions of this note were completed with a voice recognition program.  Electronically Signed By-Jayjay Varner MD On:1/25/2025 2:01 AM         [x]  EKG/Telemetry   ECG 12 Lead Chest Pain   Final Result   HEART YRKG=151  bpm   RR Choshkxu=975  ms   WI Zigpcdfc=115  ms   P Horizontal Axis=30  deg   P Front Axis=75  deg   QRSD Interval=77  ms   QT Xbxepode=283  ms   MPdK=449  ms   QRS Axis=72  deg   T Wave Axis=12  deg   - ABNORMAL ECG -   Sinus tachycardia   Multiform ventricular premature complexes   Borderline  repolarization abnormality   When compared with ECG of 25-Jan-2025 01:52:46,   No significant change   Electronically Signed By: Vinayak Campbell ([facility]) 2025-01-25 14:52:32   Date and Time of Study:2025-01-25 10:29:37      ECG 12 Lead ED Triage Standing Order; SOA   Final Result   HEART VSJW=978  bpm   RR Zbiltsjm=888  ms   WI Vxnluhmt=797  ms   P Horizontal Axis=34  deg   P Front Axis=79  deg   QRSD Interval=71  ms   QT Udwoiuon=813  ms   PZiG=390  ms   QRS Axis=71  deg   T Wave Axis=47  deg   - ABNORMAL ECG -   Sinus tachycardia   Multiform ventricular premature complexes   Borderline  ST depression, anterolateral leads   When compared with ECG of 17-Nov-2024 10:25:13,   Significant rate increase   Electronically Signed By: Vinayak Campbell ([facility]) 2025-01-25 14:52:40   Date and Time of Study:2025-01-25 01:52:46          []  Cardiology/Vascular   []  Pathology  [x]  Old records  []  Other:    Assessment & Plan   Assessment / Plan     Assessment/Plan:  Assessment:  COPD/asthma exacerbation  RSV Bronchitis  Hypertension  Hyperlipidemia  CKD stage IIIb  History of lung nodules  Depression  Acute bronchitis.  GERD without esophagitis    Plan:  Labs and imaging reviewed  Continue doxycycline 100 mg twice a day  Pulmonary consulted discussed with   Marta, [place] appreciated  Continue Solu-Medrol 40 mg IV every 8 hours  Continue metoprolol 25 mg XL daily  Continue Protonix 40 mg daily  Continue Brovana Pulmicort nebs twice daily  Continue Yupelri nebs daily  Continue DuoNebs every 6 hours  Continue Mucinex  Wean oxygen per tolerance  A.m. labs  Full code  DVT prophylaxis with heparin  Clinical course dictate further management  Discussed with nurse at the bedside    VTE Prophylaxis:  Pharmacologic VTE prophylaxis orders are present.        CODE STATUS:   Level Of Support Discussed With: Patient  Code Status (Patient has no pulse and is not breathing): CPR (Attempt to Resuscitate)  Medical Interventions (Patient has pulse or is breathing): Full Support        Electronically signed by Shaila Cabello MD, 1/27/2025, 11:33 EST.    Portions of this documentation were transcribed electronically from a voice recognition software.  I confirm all data accurately represents the service(s) I performed at today's visit.

## 2025-01-27 NOTE — PLAN OF CARE
Goal Outcome Evaluation:  Plan of Care Reviewed With: patient        Progress: improving  Outcome Evaluation: Pt alert and oriented. Pt activity not triggering elevated HR and SOB. Pt on 2LNC. No complaints of pain or discomfort throughout shift. Continue with plan of care.

## 2025-01-27 NOTE — PLAN OF CARE
Goal Outcome Evaluation:              Outcome Evaluation: Pt. alert and oriented x4. Patient becomes short of air with activity. Cough treated once per MAR. Pt. denied pain or discomfort and rested well throughout the night. Continue care per Plan of Care.

## 2025-01-28 ENCOUNTER — READMISSION MANAGEMENT (OUTPATIENT)
Dept: CALL CENTER | Facility: HOSPITAL | Age: 70
End: 2025-01-28
Payer: MEDICARE

## 2025-01-28 VITALS
SYSTOLIC BLOOD PRESSURE: 140 MMHG | OXYGEN SATURATION: 98 % | RESPIRATION RATE: 20 BRPM | HEART RATE: 90 BPM | DIASTOLIC BLOOD PRESSURE: 64 MMHG | TEMPERATURE: 97.2 F

## 2025-01-28 PROBLEM — J44.1 COPD EXACERBATION: Status: RESOLVED | Noted: 2025-01-25 | Resolved: 2025-01-28

## 2025-01-28 LAB
ALBUMIN SERPL-MCNC: 3.4 G/DL (ref 3.5–5.2)
ALP SERPL-CCNC: 89 U/L (ref 39–117)
ALT SERPL W P-5'-P-CCNC: 21 U/L (ref 1–41)
ANION GAP SERPL CALCULATED.3IONS-SCNC: 10 MMOL/L (ref 5–15)
AST SERPL-CCNC: 19 U/L (ref 1–40)
BASOPHILS # BLD AUTO: 0.02 10*3/MM3 (ref 0–0.2)
BASOPHILS NFR BLD AUTO: 0.1 % (ref 0–1.5)
BILIRUB CONJ SERPL-MCNC: 0.1 MG/DL (ref 0–0.3)
BILIRUB INDIRECT SERPL-MCNC: 0.1 MG/DL
BILIRUB SERPL-MCNC: 0.2 MG/DL (ref 0–1.2)
BUN SERPL-MCNC: 30 MG/DL (ref 8–23)
BUN/CREAT SERPL: 21.6 (ref 7–25)
CALCIUM SPEC-SCNC: 8.5 MG/DL (ref 8.6–10.5)
CHLORIDE SERPL-SCNC: 107 MMOL/L (ref 98–107)
CO2 SERPL-SCNC: 19 MMOL/L (ref 22–29)
CREAT SERPL-MCNC: 1.39 MG/DL (ref 0.76–1.27)
DEPRECATED RDW RBC AUTO: 44 FL (ref 37–54)
EGFRCR SERPLBLD CKD-EPI 2021: 55.6 ML/MIN/1.73
EOSINOPHIL # BLD AUTO: 0 10*3/MM3 (ref 0–0.4)
EOSINOPHIL NFR BLD AUTO: 0 % (ref 0.3–6.2)
ERYTHROCYTE [DISTWIDTH] IN BLOOD BY AUTOMATED COUNT: 12.5 % (ref 12.3–15.4)
GLUCOSE SERPL-MCNC: 118 MG/DL (ref 65–99)
HCT VFR BLD AUTO: 38.7 % (ref 37.5–51)
HGB BLD-MCNC: 12.6 G/DL (ref 13–17.7)
IMM GRANULOCYTES # BLD AUTO: 0.16 10*3/MM3 (ref 0–0.05)
IMM GRANULOCYTES NFR BLD AUTO: 1.1 % (ref 0–0.5)
LYMPHOCYTES # BLD AUTO: 0.89 10*3/MM3 (ref 0.7–3.1)
LYMPHOCYTES NFR BLD AUTO: 5.8 % (ref 19.6–45.3)
MAGNESIUM SERPL-MCNC: 2.3 MG/DL (ref 1.6–2.4)
MCH RBC QN AUTO: 31.6 PG (ref 26.6–33)
MCHC RBC AUTO-ENTMCNC: 32.6 G/DL (ref 31.5–35.7)
MCV RBC AUTO: 97 FL (ref 79–97)
MONOCYTES # BLD AUTO: 1.17 10*3/MM3 (ref 0.1–0.9)
MONOCYTES NFR BLD AUTO: 7.7 % (ref 5–12)
NEUTROPHILS NFR BLD AUTO: 12.98 10*3/MM3 (ref 1.7–7)
NEUTROPHILS NFR BLD AUTO: 85.3 % (ref 42.7–76)
NRBC BLD AUTO-RTO: 0 /100 WBC (ref 0–0.2)
PHOSPHATE SERPL-MCNC: 3 MG/DL (ref 2.5–4.5)
PLATELET # BLD AUTO: 203 10*3/MM3 (ref 140–450)
PMV BLD AUTO: 11.2 FL (ref 6–12)
POTASSIUM SERPL-SCNC: 3.9 MMOL/L (ref 3.5–5.2)
PROT SERPL-MCNC: 5.5 G/DL (ref 6–8.5)
RBC # BLD AUTO: 3.99 10*6/MM3 (ref 4.14–5.8)
SODIUM SERPL-SCNC: 136 MMOL/L (ref 136–145)
WBC NRBC COR # BLD AUTO: 15.22 10*3/MM3 (ref 3.4–10.8)

## 2025-01-28 PROCEDURE — 94761 N-INVAS EAR/PLS OXIMETRY MLT: CPT

## 2025-01-28 PROCEDURE — 97161 PT EVAL LOW COMPLEX 20 MIN: CPT

## 2025-01-28 PROCEDURE — 84100 ASSAY OF PHOSPHORUS: CPT | Performed by: FAMILY MEDICINE

## 2025-01-28 PROCEDURE — 94799 UNLISTED PULMONARY SVC/PX: CPT

## 2025-01-28 PROCEDURE — 63710000001 PREDNISONE PER 1 MG: Performed by: INTERNAL MEDICINE

## 2025-01-28 PROCEDURE — 85025 COMPLETE CBC W/AUTO DIFF WBC: CPT | Performed by: FAMILY MEDICINE

## 2025-01-28 PROCEDURE — 94664 DEMO&/EVAL PT USE INHALER: CPT

## 2025-01-28 PROCEDURE — 25010000002 HEPARIN (PORCINE) PER 1000 UNITS: Performed by: STUDENT IN AN ORGANIZED HEALTH CARE EDUCATION/TRAINING PROGRAM

## 2025-01-28 PROCEDURE — 83735 ASSAY OF MAGNESIUM: CPT | Performed by: FAMILY MEDICINE

## 2025-01-28 PROCEDURE — 80048 BASIC METABOLIC PNL TOTAL CA: CPT | Performed by: FAMILY MEDICINE

## 2025-01-28 PROCEDURE — 63710000001 REVEFENACIN 175 MCG/3ML SOLUTION: Performed by: STUDENT IN AN ORGANIZED HEALTH CARE EDUCATION/TRAINING PROGRAM

## 2025-01-28 PROCEDURE — 80076 HEPATIC FUNCTION PANEL: CPT | Performed by: FAMILY MEDICINE

## 2025-01-28 PROCEDURE — 99232 SBSQ HOSP IP/OBS MODERATE 35: CPT | Performed by: INTERNAL MEDICINE

## 2025-01-28 PROCEDURE — 94618 PULMONARY STRESS TESTING: CPT

## 2025-01-28 PROCEDURE — 99239 HOSP IP/OBS DSCHRG MGMT >30: CPT | Performed by: STUDENT IN AN ORGANIZED HEALTH CARE EDUCATION/TRAINING PROGRAM

## 2025-01-28 RX ORDER — PREDNISONE 20 MG/1
40 TABLET ORAL
Qty: 2 TABLET | Refills: 0 | Status: SHIPPED | OUTPATIENT
Start: 2025-01-29 | End: 2025-01-30

## 2025-01-28 RX ORDER — DOXYCYCLINE 100 MG/1
100 CAPSULE ORAL EVERY 12 HOURS SCHEDULED
Qty: 9 CAPSULE | Refills: 0 | Status: SHIPPED | OUTPATIENT
Start: 2025-01-28 | End: 2025-02-02

## 2025-01-28 RX ADMIN — IPRATROPIUM BROMIDE AND ALBUTEROL SULFATE 3 ML: .5; 3 SOLUTION RESPIRATORY (INHALATION) at 06:58

## 2025-01-28 RX ADMIN — IPRATROPIUM BROMIDE AND ALBUTEROL SULFATE 3 ML: .5; 3 SOLUTION RESPIRATORY (INHALATION) at 11:48

## 2025-01-28 RX ADMIN — ARFORMOTEROL TARTRATE 15 MCG: 15 SOLUTION RESPIRATORY (INHALATION) at 06:58

## 2025-01-28 RX ADMIN — IPRATROPIUM BROMIDE AND ALBUTEROL SULFATE 3 ML: .5; 3 SOLUTION RESPIRATORY (INHALATION) at 00:51

## 2025-01-28 RX ADMIN — REVEFENACIN 175 MCG: 175 SOLUTION RESPIRATORY (INHALATION) at 06:58

## 2025-01-28 RX ADMIN — PANTOPRAZOLE SODIUM 40 MG: 40 TABLET, DELAYED RELEASE ORAL at 06:06

## 2025-01-28 RX ADMIN — DOXYCYCLINE 100 MG: 100 CAPSULE ORAL at 08:42

## 2025-01-28 RX ADMIN — HEPARIN SODIUM 5000 UNITS: 5000 INJECTION INTRAVENOUS; SUBCUTANEOUS at 06:05

## 2025-01-28 RX ADMIN — PREDNISONE 40 MG: 20 TABLET ORAL at 08:41

## 2025-01-28 RX ADMIN — METOPROLOL SUCCINATE 25 MG: 25 TABLET, FILM COATED, EXTENDED RELEASE ORAL at 08:41

## 2025-01-28 RX ADMIN — BUDESONIDE 0.5 MG: 0.5 INHALANT RESPIRATORY (INHALATION) at 06:58

## 2025-01-28 RX ADMIN — Medication 10 ML: at 08:42

## 2025-01-28 RX ADMIN — GUAIFENESIN AND DEXTROMETHORPHAN HYDROBROMIDE 2 TABLET: 600; 30 TABLET, EXTENDED RELEASE ORAL at 08:41

## 2025-01-28 NOTE — PLAN OF CARE
Goal Outcome Evaluation:  Plan of Care Reviewed With: patient           Outcome Evaluation: Patient is able to complete all transfers and bed mobilities indepedently in the room. Pt is able to ambulate ad luis in the room. Pt does not need inpatient PT services. Recommend DC home.    Anticipated Discharge Disposition (PT): home

## 2025-01-28 NOTE — PLAN OF CARE
Goal Outcome Evaluation:  Plan of Care Reviewed With: patient           Outcome Evaluation: Alert and oriented X4. NSR on tele. Remains on 1.5L oxygen per NC. Remains in isolation precautions for RSV Bronchitis. Up to BR independently. LFA 20g patent and without redness. Pt denies sob or pain. Call light left with in reach.

## 2025-01-28 NOTE — DISCHARGE SUMMARY
[facility]         HOSPITALIST  DISCHARGE SUMMARY    Patient Name: Jonathan Guerra Jr.  : 1955  MRN: [id number]    Date of Admission: 2025  Date of Discharge:  25  Primary Care Physician: Ruel Hung MD    Consults       Date and Time Order Name Status Description    2025  6:46 AM Inpatient Pulmonology Consult Completed     2025  2:45 AM Inpatient Hospitalist Consult              Active and Resolved Hospital Problems:  Active Hospital Problems   No active problems to display.      Resolved Hospital Problems    Diagnosis POA    **COPD exacerbation [J44.1] Yes       Hospital Course     Hospital Course:  Jonathan Guerra Jr. is a 69 y.o. male with history of hypertension, dyslipidemia, CKD stage IIIb, asthma, COPD, depression, hospitalized on 2025, for chief complaint of shortness of breath. Patient treated for COPD exacerbation 2/2 RSV. Pulm consulted. No inpatient intervention needed. Patient given breathing treatments, steroids, and doxycycline. Patient is not requiring oxygen. Walk test done. No home oxygen needed. Patient feels well and is at his baseline and wants to go home. He is medically stable for discharge.         DISCHARGE Follow Up Recommendations for labs and diagnostics:   -follow up with PCP, needs repeat CT for pulmonary nodule in one year  -follow up with pulm for COPD      Day of Discharge     Vital Signs:  Temp:  [97.2 °F (36.2 °C)-97.9 °F (36.6 °C)] 97.2 °F (36.2 °C)  Heart Rate:  [] 90  Resp:  [18-24] 20  BP: (131-165)/(62-94) 140/64  Flow (L/min) (Oxygen Therapy):  [1.5-2] 2  Physical Exam:   GEN: NAD  CV: RRR  Pulm: very faint end expiratory wheezes, good air movement  Neuro: alert and oriented      Discharge Details        Discharge Medications        New Medications        Instructions Start Date   doxycycline 100 MG capsule  Commonly known as: MONODOX   100 mg, Oral, Every 12 Hours Scheduled      predniSONE 20 MG  tablet  Commonly known as: DELTASONE   40 mg, Oral, Daily With Breakfast   Start Date: January 29, 2025            Continue These Medications        Instructions Start Date   diclofenac 75 MG EC tablet  Commonly known as: VOLTAREN   75 mg, Oral, 2 Times Daily      levocetirizine 5 MG tablet  Commonly known as: XYZAL   5 mg, Oral, Every Evening      metoprolol succinate XL 25 MG 24 hr tablet  Commonly known as: TOPROL-XL   25 mg, Oral, Daily      omeprazole 40 MG capsule  Commonly known as: priLOSEC   40 mg, Oral, Daily      Ventolin  (90 Base) MCG/ACT inhaler  Generic drug: albuterol sulfate HFA   2 puffs, Inhalation, Every 6 Hours PRN               Allergies   Allergen Reactions    Azithromycin Unknown - High Severity    Codeine Unknown - High Severity    Penicillins Unknown - High Severity       Discharge Disposition:  home    Diet:  Hospital:  Diet Order   Procedures    Diet: Cardiac; Healthy Heart (2-3 Na+); Fluid Consistency: Thin (IDDSI 0)       Discharge Activity:   as tolerated     CODE STATUS:  Code Status and Medical Interventions: CPR (Attempt to Resuscitate); Full Support   Ordered at: 01/25/25 0339     Level Of Support Discussed With:    Patient     Code Status (Patient has no pulse and is not breathing):    CPR (Attempt to Resuscitate)     Medical Interventions (Patient has pulse or is breathing):    Full Support         Future Appointments   Date Time Provider Department Center   2/13/2025 10:00 AM PULM COPD CLIN [facility] PULM None       Additional Instructions for the Follow-ups that You Need to Schedule       Discharge Follow-up with PCP   As directed       Currently Documented PCP:    Ruel Hung MD    PCP Phone Number:    [phone]     Follow Up Details: 1 week                Pertinent  and/or Most Recent Results         LAB RESULTS:      Lab 01/28/25  0514 01/27/25  0531 01/26/25  0445 01/25/25  1654 01/25/25  1304 01/25/25  0829 01/25/25  0605 01/25/25  0353 01/25/25  0245    WBC 15.22* 15.58* 12.90*  --   --  13.38*  --   --  13.24*   HEMOGLOBIN 12.6* 12.1* 12.0*  --   --  13.3  --   --  12.7*   HEMATOCRIT 38.7 38.4 37.4*  --   --  42.3  --   --  39.3   PLATELETS 203 201 185  --   --  229  --   --  217   NEUTROS ABS 12.98* 13.92* 11.73*  --   --  12.55*  --   --  10.05*   IMMATURE GRANS (ABS) 0.16* 0.12* 0.09*  --   --  0.04  --   --  0.04   LYMPHS ABS 0.89 0.72 0.40*  --   --  0.53*  --   --  1.79   MONOS ABS 1.17* 0.81 0.67  --   --  0.23  --   --  1.25*   EOS ABS 0.00 0.00 0.00  --   --  0.00  --   --  0.06   MCV 97.0 98.2* 97.4*  --   --  99.8*  --   --  96.6   LACTATE  --   --  2.2* 3.8* 4.4* 7.5* 3.3*   < >  --     < > = values in this interval not displayed.         Lab 01/28/25  0514 01/27/25  0531 01/26/25  0445 01/25/25  0829 01/25/25  0245   SODIUM 136 135* 140 142 142   POTASSIUM 3.9 3.9 3.9 4.1 3.5   CHLORIDE 107 108* 111* 106 107   CO2 19.0* 18.0* 17.3* 15.9* 19.5*   ANION GAP 10.0 9.0 11.7 20.1* 15.5*   BUN 30* 26* 21 19 14   CREATININE 1.39* 1.34* 1.24 1.52* 1.62*   EGFR 55.6* 58.1* 63.7 49.9* 46.2*   GLUCOSE 118* 134* 139* 151* 98   CALCIUM 8.5* 8.6 8.3* 8.9 8.7   MAGNESIUM 2.3 2.3  --  1.9  --    PHOSPHORUS 3.0 3.1  --  3.2  --          Lab 01/28/25  0514 01/27/25  0531 01/25/25  0829 01/25/25  0245   TOTAL PROTEIN 5.5* 5.5* 6.7 6.1   ALBUMIN 3.4* 3.3* 4.0 3.8   GLOBULIN  --   --  2.7 2.3   ALT (SGPT) 21 17 17 17   AST (SGOT) 19 23 23 20   BILIRUBIN 0.2 0.2 0.4 0.3   INDIRECT BILIRUBIN 0.1 0.1  --   --    BILIRUBIN DIRECT 0.1 0.1  --   --    ALK PHOS 89 86 125* 120*         Lab 01/25/25  1025 01/25/25  0353 01/25/25  0245   PROBNP  --   --  294.5   HSTROP T 12 20 16                 Brief Urine Lab Results  (Last result in the past 365 days)        Color   Clarity   Blood   Leuk Est   Nitrite   Protein   CREAT   Urine HCG        01/01/25 1644 Yellow   Clear   Negative   Negative   Negative   Trace                 Microbiology Results (last 10 days)       Procedure  Component Value - Date/Time    Respiratory Culture - Sputum, Cough [[id number]] Collected: 01/26/25 1138    Lab Status: Final result Specimen: Sputum from Cough Updated: 01/26/25 1401     Respiratory Culture Rejected     Gram Stain Moderate (3+) Epithelial cells per low power field      Rare (1+) WBCs per low power field      Few (2+) Gram positive cocci in pairs, chains and clusters      Few (2+) Gram positive bacilli      Few (2+) Gram negative bacilli    Narrative:      Specimen rejected due to oropharyngeal contamination. Please reorder and recollect specimen if clinically necessary.    S. Pneumo Ag Urine or CSF - Urine, Urine, Clean Catch [[id number]]  (Normal) Collected: 01/26/25 0816    Lab Status: Final result Specimen: Urine, Clean Catch Updated: 01/26/25 0838     Strep Pneumo Ag Negative    Legionella Antigen, Urine - Urine, Urine, Clean Catch [[id number]]  (Normal) Collected: 01/26/25 0816    Lab Status: Final result Specimen: Urine, Clean Catch Updated: 01/26/25 0837     LEGIONELLA ANTIGEN, URINE Negative    Blood Culture - Blood, Arm, Right [[id number]]  (Normal) Collected: 01/25/25 0353    Lab Status: Preliminary result Specimen: Blood from Arm, Right Updated: 01/28/25 0400     Blood Culture No growth at 3 days    Blood Culture - Blood, Arm, Left [[id number]]  (Normal) Collected: 01/25/25 0353    Lab Status: Preliminary result Specimen: Blood from Arm, Left Updated: 01/28/25 0400     Blood Culture No growth at 3 days    COVID-19, FLU A/B, RSV PCR 1 HR TAT - Swab, Nasopharynx [[id number]]  (Abnormal) Collected: 01/25/25 0245    Lab Status: Final result Specimen: Swab from Nasopharynx Updated: 01/25/25 0343     COVID19 Not Detected     Influenza A PCR Not Detected     Influenza B PCR Not Detected     RSV, PCR Detected    Narrative:      Fact sheet for providers: https://www.fda.gov/media/157770/download    Fact sheet for patients: https://www.fda.gov/media/481021/download    Test performed by PCR.     Mycoplasma Pneumoniae Antibody, IgM - Blood, Arm, Left [[id number]]  (Normal) Collected: 01/25/25 0245    Lab Status: Final result Specimen: Blood from Arm, Left Updated: 01/26/25 0759     Mycoplasma pneumo IgM Negative            CT Chest Without Contrast Diagnostic    Result Date: 1/26/2025  Impression: 1.Findings suggestive of bronchitis without consolidation. Other stable chronic findings. Electronically Signed: Shreyas Fernandez MD  1/26/2025 9:39 AM EST  Workstation ID: [id number]    XR Chest 1 View    Result Date: 1/25/2025  No acute infiltrate is appreciated.    Portions of this note were completed with a voice recognition program.  Electronically Signed By-Jayjay Varner MD On:1/25/2025 2:01 AM        Results for orders placed during the hospital encounter of 06/22/23    Duplex Carotid Ultrasound CAR    Interpretation Summary    Right internal carotid artery demonstrates a 50-69% stenosis.    Proximal left internal carotid artery plaque without significant stenosis.    Moderate carotid bulb bifurcation plaque bilaterally.    Vertebral flow is antegrade bilaterally.    No significant change from study dated 12/16/2022.      Results for orders placed during the hospital encounter of 06/22/23    Duplex Carotid Ultrasound CAR    Interpretation Summary    Right internal carotid artery demonstrates a 50-69% stenosis.    Proximal left internal carotid artery plaque without significant stenosis.    Moderate carotid bulb bifurcation plaque bilaterally.    Vertebral flow is antegrade bilaterally.    No significant change from study dated 12/16/2022.      Results for orders placed during the hospital encounter of 01/25/25    Adult Transthoracic Echo Complete W/ Cont if Necessary Per Protocol    Interpretation Summary    Left ventricular systolic function is normal. Calculated left ventricular EF = 63.7% Left ventricular ejection fraction appears to be 61 - 65%.    Left ventricular diastolic function was normal.    Estimated  right ventricular systolic pressure from tricuspid regurgitation is mildly elevated (35-45 mmHg).    Trivial to mild tricuspid regurgitation      Labs Pending at Discharge:  Pending Labs       Order Current Status    Blood Culture - Blood, Arm, Left Preliminary result    Blood Culture - Blood, Arm, Right Preliminary result              Time spent on Discharge including face to face service:  35 minutes    Electronically signed by Robb Mancilla MD, 01/28/25, 12:57 PM EST.

## 2025-01-28 NOTE — THERAPY EVALUATION
Acute Care - Physical Therapy Initial Evaluation  [facility]     Patient Name: Jonathan Guerra Jr.  : 1955  MRN: [id number]  Today's Date: 2025      Visit Dx:     ICD-10-CM ICD-9-CM   1. Acute exacerbation of chronic obstructive pulmonary disease (COPD)  J44.1 491.21   2. Hypoxia  R09.02 799.02   3. COPD exacerbation  J44.1 491.21   4. Difficulty walking  R26.2 719.7     Patient Active Problem List   Diagnosis    Acid reflux    Chronic obstructive pulmonary disease    Anemia    Anxiety    Arthritis    Asthma    Broken bones    Cardiovascular symptoms    Closed nondisplaced fracture of left calcaneus with routine healing    Depression    Diverticulitis    Ex-smoker    Head injury    Hyperlipemia    Hypertensive heart disease without congestive heart failure    Hypertension    Luetscher's syndrome    Lung disease    Multiple nodules of lung    Ringing in ears    Dyspnea on exertion    Chest pain    DARIEL (acute kidney injury)    Dysuria    Dizziness    Syncope    Chronic kidney disease    Syncope and collapse    CKD stage 3b, GFR 30-44 ml/min     Past Medical History:   Diagnosis Date    Acid reflux     Acid reflux disease     Anemia, unspecified     Anxiety     Arthritis     Asthma     Broken bones     Calcaneus fracture, left     Chronic bronchitis     COPD (chronic obstructive pulmonary disease)     Depression     Diverticulitis     Emphysema lung     Head injury     skull fracture     Hernia cerebri     HTN (hypertension)     Hyperlipemia     Lung disease      Past Surgical History:   Procedure Laterality Date    COLONOSCOPY  ,     ENDOSCOPY       PT Assessment (Last 12 Hours)       PT Evaluation and Treatment       Row Name 25 1300          Physical Therapy Time and Intention    Subjective Information no complaints  -DP     Document Type evaluation  -DP     Mode of Treatment individual therapy;physical therapy  -DP     Patient Effort good  -DP       Row Name 25 1300           General Information    Patient Profile Reviewed yes  -DP     Patient Observations alert;cooperative;agree to therapy  -DP     Prior Level of Function independent:;gait;transfer;bed mobility;ADL's  -DP     Existing Precautions/Restrictions no known precautions/restrictions  -DP     Barriers to Rehab none identified  -DP       Row Name 01/28/25 1300          Living Environment    Current Living Arrangements home  -DP       Row Name 01/28/25 1300          Cognition    Orientation Status (Cognition) oriented x 3  -DP       Row Name 01/28/25 1300          Range of Motion Comprehensive    General Range of Motion bilateral lower extremity ROM WFL  -DP       Row Name 01/28/25 1300          Strength (Manual Muscle Testing)    Strength (Manual Muscle Testing) bilateral lower extremities;strength is WFL  -DP       Row Name 01/28/25 1300          Bed Mobility    Bed Mobility supine-sit-supine  -DP     Supine-Sit-Supine [place] (Bed Mobility) independent  -DP       Row Name 01/28/25 1300          Transfers    Transfers sit-stand transfer  -DP       Row Name 01/28/25 1300          Sit-Stand Transfer    Sit-Stand [place] (Transfers) independent  -DP       Row Name 01/28/25 1300          Gait/Stairs (Locomotion)    Gait/Stairs Locomotion gait/ambulation independence  -DP     [place] Level (Gait) modified independence  -DP     Comment, (Gait/Stairs) Pt is able to ambulate ad luis in his room. he has orders for DC home  -DP       Row Name 01/28/25 1300          Balance    Balance Assessment standing dynamic balance  -DP     Dynamic Standing Balance supervision  -DP       Row Name 01/28/25 1300          Plan of Care Review    Plan of Care Reviewed With patient  -DP     Outcome Evaluation Patient is able to complete all transfers and bed mobilities indepedently in the room. Pt is able to ambulate ad luis in the room. Pt does not need inpatient PT services. Recommend DC home.  -DP       Row Name 01/28/25 1300           Therapy Assessment/Plan (PT)    Criteria for Skilled Interventions Met (PT) no problems identified which require skilled intervention  -DP     Therapy Frequency (PT) evaluation only  -DP       Row Name 01/28/25 1300          PT Evaluation Complexity    History, PT Evaluation Complexity no personal factors and/or comorbidities  -DP     Examination of Body Systems (PT Eval Complexity) total of 4 or more elements  -DP     Clinical Presentation (PT Evaluation Complexity) stable  -DP     Clinical Decision Making (PT Evaluation Complexity) low complexity  -DP     Overall Complexity (PT Evaluation Complexity) low complexity  -DP       Row Name 01/28/25 1300          Physical Therapy Goals    Problem Specific Goal Selection (PT) problem specific goal 1, PT  -DP       Row Name 01/28/25 1300          Problem Specific Goal 1 (PT)    Problem Specific Goal 1 (PT) Complete PT evaluation.  -DP     Time Frame (Problem Specific Goal 1, PT) 1 day  -DP     Progress/Outcome (Problem Specific Goal 1, PT) goal met  -DP               User Key  (r) = Recorded By, (t) = Taken By, (c) = Cosigned By      Initials Name Provider Type    Pia He, PT Physical Therapist                    Physical Therapy Education        No education to display                  PT Recommendation and Plan  Anticipated Discharge Disposition (PT): home  Therapy Frequency (PT): evaluation only  Plan of Care Reviewed With: patient  Outcome Evaluation: Patient is able to complete all transfers and bed mobilities indepedently in the room. Pt is able to ambulate ad luis in the room. Pt does not need inpatient PT services. Recommend DC home.   Outcome Measures       Row Name 01/28/25 1300             How much help from another person do you currently need...    Turning from your back to your side while in flat bed without using bedrails? 4  -DP      Moving from lying on back to sitting on the side of a flat bed without bedrails? 4  -DP      Moving to and from a  bed to a chair (including a wheelchair)? 4  -DP      Standing up from a chair using your arms (e.g., wheelchair, bedside chair)? 4  -DP      Climbing 3-5 steps with a railing? 4  -DP      To walk in hospital room? 4  -DP      AM-PAC 6 Clicks Score (PT) 24  -DP         Functional Assessment    Outcome Measure Options AM-PAC 6 Clicks Basic Mobility (PT)  -DP                User Key  (r) = Recorded By, (t) = Taken By, (c) = Cosigned By      Initials Name Provider Type    Pia He, PT Physical Therapist                     Time Calculation:    PT Charges       Row Name 01/28/25 1342             Time Calculation    PT Received On 01/28/25  -DP         Untimed Charges    PT Eval/Re-eval Minutes 20  -DP         Total Minutes    Untimed Charges Total Minutes 20  -DP       Total Minutes 20  -DP                User Key  (r) = Recorded By, (t) = Taken By, (c) = Cosigned By      Initials Name Provider Type    Pia He, PT Physical Therapist                  Therapy Charges for Today       Code Description Service Date Service Provider Modifiers Qty    52148686604 HC PT EVAL LOW COMPLEXITY 2 1/28/2025 Pia Mosley, PT GP 1            PT G-Codes  Outcome Measure Options: AM-PAC 6 Clicks Basic Mobility (PT)  AM-PAC 6 Clicks Score (PT): 24    Pia Mosley, PT  1/28/2025

## 2025-01-28 NOTE — PROGRESS NOTES
Walking Oximetry Progress Note      Patient Name:  Jonathan Guerra Jr.  YOB: 1955  Date of Procedure: 01/28/25              ROOM AIR BASELINE   SpO2% 95   Heart Rate 102     EXERCISE ON ROOM AIR SpO2% EXERCISE ON O2 LPM SpO2%   1 MINUTE 95 1 MINUTE     2 MINUTES 94 2 MINUTES     3 MINUTES 95 3 MINUTES     4 MINUTES 94 4 MINUTES     5 MINUTES 95 5 MINUTES     6 MINUTES 95 6 MINUTES                Time to Recovery  5 Minute   SpO2% Post Exercise  96% on room air.    HR Post Exercise  104     Comments:           Electronically signed by Kady Calderón CRT, 01/28/25, 10:23 AM EST.

## 2025-01-28 NOTE — PLAN OF CARE
Goal Outcome Evaluation:      Patient alert and orientated. No complaints of pain or SOB. Plans to discharge home with family. Driving self after discharge. MD aware and agreeable.

## 2025-01-28 NOTE — PROGRESS NOTES
Pulmonary / Critical Care Progress Note      Patient Name: Jonathan Guerra Jr.  : 1955  MRN: [id number]  Primary Care Physician:  Ruel Hung MD  Date of admission: 2025    Subjective   Subjective   Follow-up for COPD exacerbation, history of pulmonary nodules    No acute events overnight.    This afternoon,  Resting in bed  Overall feeling better  Dyspnea improved  On 2 L nasal cannula  No fevers  Feels very discharged home      Objective   Objective     Vitals:   Temp:  [97.3 °F (36.3 °C)-97.9 °F (36.6 °C)] 97.9 °F (36.6 °C)  Heart Rate:  [] 85  Resp:  [16-24] 20  BP: (131-149)/(65-74) 146/66  Flow (L/min) (Oxygen Therapy):  [1.5-2] 1.5    Physical Exam   Vital Signs Reviewed   General: Chronically ill appearing male, Alert, NAD, sitting up in bed.    Chest: Improved aeration, clear to auscultation bilaterally, no work of breathing noted on 2 L NC  CV: regular rate and rhythm regular      Result Review    Result Review:  I have personally reviewed the results from the time of this admission to 2025 08:05 EST and agree with these findings:  [x]  Laboratory  [x]  Microbiology  [x]  Radiology  [x]  EKG/Telemetry   []  Cardiology/Vascular   []  Pathology  []  Old records  []  Other:  Most notable findings include:         Lab 25  0514 25  0531 25  0445 25  0829 25  0245   WBC 15.22* 15.58* 12.90* 13.38* 13.24*   HEMOGLOBIN 12.6* 12.1* 12.0* 13.3 12.7*   HEMATOCRIT 38.7 38.4 37.4* 42.3 39.3   PLATELETS 203 201 185 229 217   SODIUM 136 135* 140 142 142   POTASSIUM 3.9 3.9 3.9 4.1 3.5   CHLORIDE 107 108* 111* 106 107   CO2 19.0* 18.0* 17.3* 15.9* 19.5*   BUN 30* 26* 21 19 14   CREATININE 1.39* 1.34* 1.24 1.52* 1.62*   GLUCOSE 118* 134* 139* 151* 98   CALCIUM 8.5* 8.6 8.3* 8.9 8.7   PHOSPHORUS 3.0 3.1  --  3.2  --    TOTAL PROTEIN 5.5* 5.5*  --  6.7 6.1   ALBUMIN 3.4* 3.3*  --  4.0 3.8   GLOBULIN  --   --   --  2.7 2.3     CT Chest Without Contrast  Diagnostic    Result Date: 1/26/2025  CT CHEST WO CONTRAST DIAGNOSTIC Date of Exam: 1/26/2025 9:25 AM EST Indication: pneumonia worsening hypoxemia. Comparison: 6/23/2023 Technique: Axial CT images were obtained of the chest without contrast administration.  Reconstructed coronal and sagittal images were also obtained. Automated exposure control and iterative construction methods were used. Findings: MEDIASTINUM: Small sliding hiatal hernia. Aortic and heart size are normal. No mass nor pericardial effusion. CORONARY ARTERIES: There is calcified atherosclerotic disease. LUNGS: There is bilateral bronchial wall thickening most pronounced in the right lung with some areas of scattered endobronchial fluid/secretions. Correlate for bronchitis. No consolidation. Stable benign 5 mm right and left apical nodules. No suspicious  nodule. PLEURAL SPACE: No effusion, mass, nor pneumothorax. LYMPH NODES: There are no pathologically enlarged lymph nodes. UPPER ABDOMEN: Unremarkable OSSEOUS STRUCTURES: Appropriate for age with no acute process identified.     Impression: Impression: 1.Findings suggestive of bronchitis without consolidation. Other stable chronic findings. Electronically Signed: Shreyas Fernandez MD  1/26/2025 9:39 AM EST  Workstation ID: [id number]     Assessment & Plan   Assessment / Plan     Active Hospital Problems:  Active Hospital Problems    Diagnosis     **COPD exacerbation      Impression:  Acute respiratory failure with hypoxia  Acute COPD/asthma exacerbation  RSV bronchiolitis  Stable pulmonary nodule  Acute hypoxic respiratory failure  Exacerbation of COPD/asthma overlap  RSV bronchitis  Stable pulmonary nodules  CKD stage IIIb  History of tobacco abuse of cigarettes      Plan:  Ok to c home  F/u with us 1-2 weeks  CT in 1 year  DC on LABA/ICS,and PRN MARZENA    VTE Prophylaxis:  Pharmacologic VTE prophylaxis orders are present.        CODE STATUS:   Level Of Support Discussed With: Patient  Code Status (Patient  has no pulse and is not breathing): CPR (Attempt to Resuscitate)  Medical Interventions (Patient has pulse or is breathing): Full Support      Electronically signed by Mirza Lozano DO, 01/28/25, 8:05 AM EST.    Electronically signed by MALLORY Tanner, 01/28/25, 1:56 PM EST.  I personally seen  examined this patient and the medical decision making and assessment and plan reflect my and I assume responsibility for the care of this patient    Electronically signed by Mirza Lozano DO, 01/28/25, 5:51 PM EST.

## 2025-01-29 NOTE — OUTREACH NOTE
Prep Survey      Flowsheet Row Responses   [facility] facility patient discharged from? [facility]   Is LACE score < 7 ? No   Eligibility Readm Mgmt   Discharge diagnosis COPD exacerbation   Does the patient have one of the following disease processes/diagnoses(primary or secondary)? COPD   Does the patient have Home health ordered? No   Is there a DME ordered? No   Prep survey completed? Yes            ROHINI MALHOTRA - Registered Nurse

## 2025-01-29 NOTE — PROGRESS NOTES
Enter Query Response Below      Query Response: lactic acidosis due to RSV           If applicable, please update the problem list.   Patient: Jonathan Guerra Jr.        : 1955  Account: [id number]           Admit Date: 2025        How to Respond to this query:       a. Click New Note     b. Answer query within the yellow box.                c. Update the Problem List, if applicable.      If you have any questions about this query contact me at:  [email or web address]       Dr. Mancilla,     The patient was admitted 25 with COPD/asthma exacerbation and on  RSV bronchitis was noted.  Treatment has included supplemental oxygen, nebulizer treatments, steroids, oral Doxycycline, Normal Saline 1 Liter bolus x 2 , and Normal Saline infusion 125 cc/hr -.     25 Lactate 3.1, 3.3, 7.5, 4.4, 3.8,  25 Lactate 2.2     Please clarify if patient treated/monitored for one or more of the following:    Lactic acidosis due to _________________  Abnormal laboratory values of no significance  Other- specify_____    By submitting this query, we are merely seeking further clarification of documentation to accurately reflect all conditions that you are monitoring, evaluating, treating or that extend the hospitalization or utilize additional resources of care. Please utilize your independent clinical judgment when addressing the question(s) above.     This query and your response, once completed, will be entered into the legal medical record.    Sincerely,  Lola GORDON, RN, CCDS  Clinical Documentation Improvement Program  [email or web address]

## 2025-01-30 LAB
BACTERIA SPEC AEROBE CULT: NORMAL
BACTERIA SPEC AEROBE CULT: NORMAL

## 2025-02-04 ENCOUNTER — READMISSION MANAGEMENT (OUTPATIENT)
Dept: CALL CENTER | Facility: HOSPITAL | Age: 70
End: 2025-02-04
Payer: MEDICARE

## 2025-02-04 NOTE — OUTREACH NOTE
COPD/PN Week 1 Survey      Flowsheet Row Responses   [facility] patient discharged from? [facility]   Does the patient have one of the following disease processes/diagnoses(primary or secondary)? COPD   Week 1 attempt successful? Yes   Call start time 1049   Call end time 1052   Discharge diagnosis COPD exacerbation   Person spoke with today (if not patient) and relationship Patient   Meds reviewed with patient/caregiver? Yes   Does the patient have all medications ordered at discharge? Yes   Prescription comments No concerns or questions noted.   Is the patient taking all medications as directed (includes completed medication regime)? Yes   Does the patient have a primary care provider?  Yes   Comments regarding PCP Seen pcp yesterday.   Has home health visited the patient within 72 hours of discharge? N/A   Psychosocial issues? No   Did the patient receive a copy of their discharge instructions? Yes   Nursing interventions Reviewed instructions with patient   What is the patient's perception of their health status since discharge? New symptoms unrelated to diagnosis  [Feels like he has the FLU]   Is the patient/caregiver able to teach back the hierarchy of who to call/visit for symptoms/problems? PCP, Specialist, Home health nurse, Urgent Care, ED, 911 Yes   Is the patient able to teach back COPD zones? Yes   Patient reports what zone on this call? Green Zone   Green Zone Reports doing well, Breathing without shortness of breath   Week 1 call completed? Yes   Wrap up additional comments Patient reports feeling horrible today. Advised to call pcp. patient seen pcp yesterday and felt okay. However today feels horrible like he has the FLU. Advised to call pcp and discuss getting tested. Patient understands- we Discussed when to return to the ED. Patient states breathing is doing well. No other current concerns or questions noted.   Call end time 1052            Patsy OSUNA - Registered Nurse

## 2025-02-12 ENCOUNTER — TELEPHONE (OUTPATIENT)
Facility: HOSPITAL | Age: 70
End: 2025-02-12
Payer: MEDICARE

## 2025-02-13 ENCOUNTER — HOSPITAL ENCOUNTER (OUTPATIENT)
Facility: HOSPITAL | Age: 70
Discharge: HOME | End: 2025-02-13
Payer: COMMERCIAL

## 2025-02-13 ENCOUNTER — READMISSION MANAGEMENT (OUTPATIENT)
Dept: CALL CENTER | Facility: HOSPITAL | Age: 70
End: 2025-02-13
Payer: MEDICARE

## 2025-02-13 VITALS
SYSTOLIC BLOOD PRESSURE: 157 MMHG | HEIGHT: 65 IN | BODY MASS INDEX: 27.63 KG/M2 | RESPIRATION RATE: 16 BRPM | HEART RATE: 79 BPM | WEIGHT: 165.8 LBS | DIASTOLIC BLOOD PRESSURE: 78 MMHG | OXYGEN SATURATION: 97 % | TEMPERATURE: 97.6 F

## 2025-02-13 DIAGNOSIS — J43.2 CENTRILOBULAR EMPHYSEMA: Chronic | ICD-10-CM

## 2025-02-13 DIAGNOSIS — J44.89 ASTHMA-COPD OVERLAP SYNDROME: Primary | Chronic | ICD-10-CM

## 2025-02-13 DIAGNOSIS — F17.211 NICOTINE DEPENDENCE, CIGARETTES, IN REMISSION: ICD-10-CM

## 2025-02-13 DIAGNOSIS — R91.1 LUNG NODULE: ICD-10-CM

## 2025-02-13 DIAGNOSIS — R06.09 DYSPNEA ON EXERTION: ICD-10-CM

## 2025-02-13 RX ORDER — FLUTICASONE FUROATE, UMECLIDINIUM BROMIDE AND VILANTEROL TRIFENATATE 200; 62.5; 25 UG/1; UG/1; UG/1
POWDER RESPIRATORY (INHALATION)
COMMUNITY
Start: 2025-02-03

## 2025-02-13 RX ORDER — PREDNISONE 20 MG/1
TABLET ORAL
COMMUNITY
Start: 2025-02-03 | End: 2025-02-13

## 2025-02-13 NOTE — ADDENDUM NOTE
Encounter addended by: Reema Estrada CMA on: 2/13/2025 9:32 AM   Actions taken: Charge Capture section accepted

## 2025-02-13 NOTE — PROGRESS NOTES
Primary Care Provider  Ruel Hung MD     Referring Provider  Shaila BRUNO MD     Chief Complaint  COPD, Hospital Follow Up Visit ([facility] 1/25-1/28), and Wheezing (With exertion )    Subjective          Jonathan Guerra Jr. presents to [facility]  History of Present Illness  Jonathan Guerra Jr. is a 69 y.o. male patient here for management of pulmonary nodules.    Patient was recently admitted and depression.  He was hospitalized on 1/25/2025 with complaints of shortness of breath.  Patient was found to have a COPD exacerbation secondary to RSV to [facility] from 1/25/2025 until 1/28/2025.  Per his discharge summary, He does have hypertension, hyperlipidemia, stage III kidney disease, asthma, COPD.  Pulmonary was consulted with no inpatient intervention needed.  Patient did receive breathing treatments, steroids and doxycycline.  He did not require oxygen.  Walk test was done prior to discharge with no need for home oxygen.  Patient does have a pulmonary nodule and is in need of a repeat CT 1 year.    Patient is doing well since discharge.  He has Trelegy 200 and this was prescribed by his PCP.  He states he has yet to use this inhaler.  His albuterol use had improved since discharge.  No fevers, chills or a productive cough.   He is aware of his lung nodule and agreeable to having a repeat CT next January.  He is also agreeable to having an updated PFT.  Overall, he is doing well and has no additional questions or concerns.     His history of smoking is   Tobacco Use: Medium Risk (2/13/2025)    Patient History     Smoking Tobacco Use: Former     Smokeless Tobacco Use: Never     Passive Exposure: Past   .    Review of Systems   Constitutional:  Negative for chills, fatigue, fever, unexpected weight gain and unexpected weight loss.   HENT:  Congestion: Nasal.    Respiratory:  Positive for wheezing. Negative for apnea, cough and shortness of breath.         Negative  for Hemoptysis     Cardiovascular:  Negative for chest pain, palpitations and leg swelling.   Skin:         Negative for cyanosis      Sleep: Negative for Excessive daytime sleepiness  Negative for morning headaches  Negative for Snoring    Family History   Problem Relation Age of Onset    Other Mother         Thyroid Gland Neoplasm    Heart disease Mother     Colon cancer Mother     Diabetes Sister     Colon cancer Paternal Grandmother         Social History     Socioeconomic History    Marital status: Legally    Tobacco Use    Smoking status: Former     Current packs/day: 0.00     Average packs/day: 1.5 packs/day for 39.0 years (58.5 ttl pk-yrs)     Types: Cigarettes     Start date:      Quit date:      Years since quittin.1     Passive exposure: Past    Smokeless tobacco: Never   Vaping Use    Vaping status: Never Used   Substance and Sexual Activity    Alcohol use: Yes     Comment: Occassional    Drug use: Never    Sexual activity: Defer        Past Medical History:   Diagnosis Date    Acid reflux     Acid reflux disease     Anemia, unspecified     Anxiety     Arthritis     Asthma     Broken bones     Calcaneus fracture, left     Chronic bronchitis     COPD (chronic obstructive pulmonary disease)     Depression     Diverticulitis     Emphysema lung     Head injury     skull fracture     Hernia cerebri     HTN (hypertension)     Hyperlipemia     Lung disease         Immunization History   Administered Date(s) Administered    COVID-19 (MODERNA) 1st,2nd,3rd Dose Monovalent 2021, 2021    Flu Vaccine Quad PF >36MO 2017    Flu Vaccine Split Quad 10/04/2022    Fluzone  >6mos 10/10/2016    Fluzone (or Fluarix & Flulaval for VFC) >6mos 2017, 10/11/2021    Fluzone Quad >6mos (Multi-dose) 10/10/2016, 2017, 2018    Influenza TIV (IM) 10/22/2012, 2013, 2015, 2020, 10/22/2020    Influenza, Unspecified 10/01/2020, 10/04/2022    Pneumococcal Conjugate  20-Valent (PCV20) 10/19/2023    Pneumococcal Polysaccharide (PPSV23) 08/17/2021    Tdap 08/08/2019         Allergies   Allergen Reactions    Azithromycin Unknown - High Severity    Codeine Unknown - High Severity    Penicillins Unknown - High Severity          Current Outpatient Medications:     diclofenac (VOLTAREN) 75 MG EC tablet, Take 1 tablet by mouth 2 (Two) Times a Day., Disp: , Rfl:     levocetirizine (XYZAL) 5 MG tablet, Take 1 tablet by mouth Every Evening., Disp: , Rfl:     metoprolol succinate XL (TOPROL-XL) 25 MG 24 hr tablet, Take 1 tablet by mouth Daily., Disp: , Rfl:     omeprazole (priLOSEC) 40 MG capsule, Take 1 capsule by mouth Daily., Disp: , Rfl:     Trelegy Ellipta 200-62.5-25 MCG/ACT inhaler, , Disp: , Rfl:     Ventolin  (90 Base) MCG/ACT inhaler, Inhale 2 puffs Every 6 (Six) Hours As Needed for Wheezing or Shortness of Air., Disp: , Rfl:      Objective   Physical Exam  Constitutional:       General: He is not in acute distress.     Appearance: Normal appearance. He is normal weight.   HENT:      Right Ear: Hearing normal.      Left Ear: Hearing normal.      Nose: No nasal tenderness or congestion.      Mouth/Throat:      Mouth: Mucous membranes are moist. No oral lesions.   Eyes:      Extraocular Movements: Extraocular movements intact.      Pupils: Pupils are equal, round, and reactive to light.   Cardiovascular:      Rate and Rhythm: Normal rate and regular rhythm.      Pulses: Normal pulses.      Heart sounds: Normal heart sounds. No murmur heard.  Pulmonary:      Effort: Pulmonary effort is normal.      Breath sounds: Normal breath sounds. No wheezing, rhonchi or rales.   Musculoskeletal:      Right lower leg: No edema.      Left lower leg: No edema.   Skin:     General: Skin is warm and dry.      Findings: No lesion or rash.   Neurological:      General: No focal deficit present.      Mental Status: He is alert and oriented to person, place, and time.   Psychiatric:         Mood  "and Affect: Affect normal. Mood is not anxious or depressed.         Vital Signs:   /78   Pulse 79   Temp 97.6 °F (36.4 °C) (Oral)   Resp 16   Ht 165.1 cm (65\")   Wt 75.2 kg (165 lb 12.8 oz)   SpO2 97% Comment: RA  BMI 27.59 kg/m²        Result Review :   The following data was reviewed by: MALLORY Horton on 02/13/2025:  CMP          1/26/2025    04:45 1/27/2025    05:31 1/28/2025    05:14   CMP   Glucose 139  134  118    BUN 21  26  30    Creatinine 1.24  1.34  1.39    EGFR 63.7  58.1  55.6    Sodium 140  135  136    Potassium 3.9  3.9  3.9    Chloride 111  108  107    Calcium 8.3  8.6  8.5    Total Protein  5.5  5.5    Albumin  3.3  3.4    Total Bilirubin  0.2  0.2    Alkaline Phosphatase  86  89    AST (SGOT)  23  19    ALT (SGPT)  17  21    BUN/Creatinine Ratio 16.9  19.4  21.6    Anion Gap 11.7  9.0  10.0      CBC w/diff          1/26/2025    04:45 1/27/2025    05:31 1/28/2025    05:14   CBC w/Diff   WBC 12.90  15.58  15.22    RBC 3.84  3.91  3.99    Hemoglobin 12.0  12.1  12.6    Hematocrit 37.4  38.4  38.7    MCV 97.4  98.2  97.0    MCH 31.3  30.9  31.6    MCHC 32.1  31.5  32.6    RDW 12.7  12.6  12.5    Platelets 185  201  203    Neutrophil Rel % 90.9  89.3  85.3    Immature Granulocyte Rel % 0.7  0.8  1.1    Lymphocyte Rel % 3.1  4.6  5.8    Monocyte Rel % 5.2  5.2  7.7    Eosinophil Rel % 0.0  0.0  0.0    Basophil Rel % 0.1  0.1  0.1      Data reviewed : Radiologic studies chest CT 2/14/2023, chest xray 1/25/2025, chest CT 1/26/2025, PFT 2/24/2023, Cardiology studies echocardiogram 1/26/2025, Consultant notes Dr. Lozano consult note 1/27/2025, Recent hospitalization notes hospital discharge summary 1/28/2025, and my last office note 10/19//2023    Procedures        Assessment and Plan    Diagnoses and all orders for this visit:    1. Asthma-COPD overlap syndrome (Primary)  Comments:  continue Trelegy  Orders:  -     CT Chest Without Contrast; Future  -     Complete PFT - Pre & Post " Bronchodilator; Future    2. Lung nodule  Comments:  repeat CT 1 year    3. Nicotine dependence, cigarettes, in remission    4. Dyspnea on exertion  Comments:  continue albuterol as needed    5. Centrilobular emphysema  Comments:  continue Trelegy  Orders:  -     Alpha - 1 - Antitrypsin; Future          Follow Up   Return in about 3 months (around 5/13/2025) for Recheck with Tanya.  Patient was given instructions and counseling regarding his condition or for health maintenance advice. Please see specific information pulled into the AVS if appropriate.

## 2025-02-17 ENCOUNTER — READMISSION MANAGEMENT (OUTPATIENT)
Dept: CALL CENTER | Facility: HOSPITAL | Age: 70
End: 2025-02-17
Payer: MEDICARE

## 2025-02-17 NOTE — OUTREACH NOTE
COPD/PN Week 2 Survey      Flowsheet Row Responses   [facility] patient discharged from? [facility]   Does the patient have one of the following disease processes/diagnoses(primary or secondary)? COPD   Week 2 attempt successful? Yes   Call start time 1225   Call end time 1225   Discharge diagnosis COPD exacerbation   Person spoke with today (if not patient) and relationship Patient   What is the patient's perception of their health status since discharge? Improving   Is the patient/caregiver able to teach back the hierarchy of who to call/visit for symptoms/problems? PCP, Specialist, Home health nurse, Urgent Care, ED, 911 Yes   Week 2 call completed? Yes   Graduated Yes   Wrap up additional comments Patient reports doing very well. No concerns or questions noted.   Call end time 1225            Patsy OSUNA - Registered Nurse

## 2025-02-26 DIAGNOSIS — J43.2 CENTRILOBULAR EMPHYSEMA: Chronic | ICD-10-CM

## 2025-05-24 ENCOUNTER — HOSPITAL ENCOUNTER (EMERGENCY)
Facility: HOSPITAL | Age: 70
Discharge: HOME | End: 2025-05-24
Attending: EMERGENCY MEDICINE
Payer: MEDICARE

## 2025-05-24 ENCOUNTER — APPOINTMENT (OUTPATIENT)
Dept: GENERAL RADIOLOGY | Facility: HOSPITAL | Age: 70
End: 2025-05-24
Payer: MEDICARE

## 2025-05-24 VITALS
SYSTOLIC BLOOD PRESSURE: 138 MMHG | TEMPERATURE: 99 F | RESPIRATION RATE: 22 BRPM | HEIGHT: 65 IN | OXYGEN SATURATION: 92 % | DIASTOLIC BLOOD PRESSURE: 74 MMHG | HEART RATE: 101 BPM | BODY MASS INDEX: 25.93 KG/M2 | WEIGHT: 155.65 LBS

## 2025-05-24 DIAGNOSIS — J44.1 ACUTE EXACERBATION OF CHRONIC OBSTRUCTIVE PULMONARY DISEASE (COPD): Primary | ICD-10-CM

## 2025-05-24 DIAGNOSIS — M62.838 MUSCLE SPASM: ICD-10-CM

## 2025-05-24 LAB
ALBUMIN SERPL-MCNC: 4.2 G/DL (ref 3.5–5.2)
ALBUMIN/GLOB SERPL: 1.4 G/DL
ALP SERPL-CCNC: 138 U/L (ref 39–117)
ALT SERPL W P-5'-P-CCNC: 19 U/L (ref 1–41)
ANION GAP SERPL CALCULATED.3IONS-SCNC: 14.1 MMOL/L (ref 5–15)
AST SERPL-CCNC: 29 U/L (ref 1–40)
BASOPHILS # BLD AUTO: 0.05 10*3/MM3 (ref 0–0.2)
BASOPHILS NFR BLD AUTO: 0.6 % (ref 0–1.5)
BILIRUB SERPL-MCNC: 0.5 MG/DL (ref 0–1.2)
BUN SERPL-MCNC: 13 MG/DL (ref 8–23)
BUN/CREAT SERPL: 8.5 (ref 7–25)
CALCIUM SPEC-SCNC: 8.4 MG/DL (ref 8.6–10.5)
CHLORIDE SERPL-SCNC: 108 MMOL/L (ref 98–107)
CO2 SERPL-SCNC: 17.9 MMOL/L (ref 22–29)
CREAT SERPL-MCNC: 1.53 MG/DL (ref 0.76–1.27)
D-LACTATE SERPL-SCNC: 2.6 MMOL/L (ref 0.5–2)
DEPRECATED RDW RBC AUTO: 41.7 FL (ref 37–54)
EGFRCR SERPLBLD CKD-EPI 2021: 49.5 ML/MIN/1.73
EOSINOPHIL # BLD AUTO: 0.13 10*3/MM3 (ref 0–0.4)
EOSINOPHIL NFR BLD AUTO: 1.5 % (ref 0.3–6.2)
ERYTHROCYTE [DISTWIDTH] IN BLOOD BY AUTOMATED COUNT: 12 % (ref 12.3–15.4)
GEN 5 1HR TROPONIN T REFLEX: 14 NG/L
GLOBULIN UR ELPH-MCNC: 3 GM/DL
GLUCOSE SERPL-MCNC: 106 MG/DL (ref 65–99)
HCT VFR BLD AUTO: 43 % (ref 37.5–51)
HGB BLD-MCNC: 14.2 G/DL (ref 13–17.7)
HOLD SPECIMEN: NORMAL
HOLD SPECIMEN: NORMAL
IMM GRANULOCYTES # BLD AUTO: 0.02 10*3/MM3 (ref 0–0.05)
IMM GRANULOCYTES NFR BLD AUTO: 0.2 % (ref 0–0.5)
LYMPHOCYTES # BLD AUTO: 1.25 10*3/MM3 (ref 0.7–3.1)
LYMPHOCYTES NFR BLD AUTO: 14 % (ref 19.6–45.3)
MCH RBC QN AUTO: 31.3 PG (ref 26.6–33)
MCHC RBC AUTO-ENTMCNC: 33 G/DL (ref 31.5–35.7)
MCV RBC AUTO: 94.7 FL (ref 79–97)
MONOCYTES # BLD AUTO: 1.13 10*3/MM3 (ref 0.1–0.9)
MONOCYTES NFR BLD AUTO: 12.7 % (ref 5–12)
NEUTROPHILS NFR BLD AUTO: 6.32 10*3/MM3 (ref 1.7–7)
NEUTROPHILS NFR BLD AUTO: 71 % (ref 42.7–76)
NRBC BLD AUTO-RTO: 0 /100 WBC (ref 0–0.2)
NT-PROBNP SERPL-MCNC: 468.1 PG/ML (ref 0–900)
PLATELET # BLD AUTO: 164 10*3/MM3 (ref 140–450)
PMV BLD AUTO: 11.2 FL (ref 6–12)
POTASSIUM SERPL-SCNC: 3.8 MMOL/L (ref 3.5–5.2)
PROT SERPL-MCNC: 7.2 G/DL (ref 6–8.5)
QT INTERVAL: 332 MS
QTC INTERVAL: 423 MS
RBC # BLD AUTO: 4.54 10*6/MM3 (ref 4.14–5.8)
SODIUM SERPL-SCNC: 140 MMOL/L (ref 136–145)
TROPONIN T NUMERIC DELTA: -3 NG/L
TROPONIN T SERPL HS-MCNC: 17 NG/L
WBC NRBC COR # BLD AUTO: 8.9 10*3/MM3 (ref 3.4–10.8)
WHOLE BLOOD HOLD COAG: NORMAL
WHOLE BLOOD HOLD SPECIMEN: NORMAL

## 2025-05-24 PROCEDURE — 94799 UNLISTED PULMONARY SVC/PX: CPT

## 2025-05-24 PROCEDURE — 94761 N-INVAS EAR/PLS OXIMETRY MLT: CPT

## 2025-05-24 PROCEDURE — 36415 COLL VENOUS BLD VENIPUNCTURE: CPT

## 2025-05-24 PROCEDURE — 87040 BLOOD CULTURE FOR BACTERIA: CPT | Performed by: EMERGENCY MEDICINE

## 2025-05-24 PROCEDURE — 93005 ELECTROCARDIOGRAM TRACING: CPT | Performed by: EMERGENCY MEDICINE

## 2025-05-24 PROCEDURE — 96374 THER/PROPH/DIAG INJ IV PUSH: CPT

## 2025-05-24 PROCEDURE — 80053 COMPREHEN METABOLIC PANEL: CPT | Performed by: EMERGENCY MEDICINE

## 2025-05-24 PROCEDURE — 99284 EMERGENCY DEPT VISIT MOD MDM: CPT

## 2025-05-24 PROCEDURE — 71045 X-RAY EXAM CHEST 1 VIEW: CPT

## 2025-05-24 PROCEDURE — 85025 COMPLETE CBC W/AUTO DIFF WBC: CPT | Performed by: EMERGENCY MEDICINE

## 2025-05-24 PROCEDURE — 83880 ASSAY OF NATRIURETIC PEPTIDE: CPT | Performed by: EMERGENCY MEDICINE

## 2025-05-24 PROCEDURE — 25010000002 METHYLPREDNISOLONE PER 125 MG: Performed by: EMERGENCY MEDICINE

## 2025-05-24 PROCEDURE — 84484 ASSAY OF TROPONIN QUANT: CPT | Performed by: EMERGENCY MEDICINE

## 2025-05-24 PROCEDURE — 83605 ASSAY OF LACTIC ACID: CPT | Performed by: EMERGENCY MEDICINE

## 2025-05-24 PROCEDURE — 94640 AIRWAY INHALATION TREATMENT: CPT

## 2025-05-24 RX ORDER — METHOCARBAMOL 750 MG/1
1500 TABLET, FILM COATED ORAL 3 TIMES DAILY PRN
Qty: 30 TABLET | Refills: 0 | Status: SHIPPED | OUTPATIENT
Start: 2025-05-24 | End: 2025-05-24

## 2025-05-24 RX ORDER — SODIUM CHLORIDE 0.9 % (FLUSH) 0.9 %
10 SYRINGE (ML) INJECTION AS NEEDED
Status: DISCONTINUED | OUTPATIENT
Start: 2025-05-24 | End: 2025-05-24 | Stop reason: HOSPADM

## 2025-05-24 RX ORDER — PREDNISONE 20 MG/1
60 TABLET ORAL DAILY
Qty: 15 TABLET | Refills: 0 | Status: SHIPPED | OUTPATIENT
Start: 2025-05-24

## 2025-05-24 RX ORDER — PREDNISONE 20 MG/1
60 TABLET ORAL DAILY
Qty: 15 TABLET | Refills: 0 | Status: SHIPPED | OUTPATIENT
Start: 2025-05-24 | End: 2025-05-24

## 2025-05-24 RX ORDER — DOXYCYCLINE 100 MG/1
100 CAPSULE ORAL 2 TIMES DAILY
Qty: 14 CAPSULE | Refills: 0 | Status: SHIPPED | OUTPATIENT
Start: 2025-05-24 | End: 2025-05-31

## 2025-05-24 RX ORDER — METHOCARBAMOL 750 MG/1
1500 TABLET, FILM COATED ORAL 3 TIMES DAILY PRN
Qty: 30 TABLET | Refills: 0 | Status: SHIPPED | OUTPATIENT
Start: 2025-05-24

## 2025-05-24 RX ORDER — DOXYCYCLINE 100 MG/1
100 CAPSULE ORAL 2 TIMES DAILY
Qty: 14 CAPSULE | Refills: 0 | Status: SHIPPED | OUTPATIENT
Start: 2025-05-24 | End: 2025-05-24

## 2025-05-24 RX ORDER — IPRATROPIUM BROMIDE AND ALBUTEROL SULFATE 2.5; .5 MG/3ML; MG/3ML
3 SOLUTION RESPIRATORY (INHALATION) ONCE
Status: COMPLETED | OUTPATIENT
Start: 2025-05-24 | End: 2025-05-24

## 2025-05-24 RX ADMIN — WATER 125 MG: 1 INJECTION INTRAMUSCULAR; INTRAVENOUS; SUBCUTANEOUS at 13:45

## 2025-05-24 RX ADMIN — IPRATROPIUM BROMIDE AND ALBUTEROL SULFATE 3 ML: .5; 3 SOLUTION RESPIRATORY (INHALATION) at 13:46

## 2025-05-24 NOTE — ED PROVIDER NOTES
Time: 1:56 PM EDT  Date of encounter:  5/24/2025  Independent Historian/Clinical History and Information was obtained by:   Patient and Family    History is limited by: N/A    Chief Complaint: Shortness of air      History of Present Illness:  Patient is a 69 y.o. year old male who presents to the emergency department for evaluation of shortness of air.  Patient presents complaining of shortness of breath and wheezing for 2 weeks.  He does have a history of COPD and states his home nebulizer treatment has not been helping.      Patient Care Team  Primary Care Provider: Ruel Hung MD    Past Medical History:     Allergies   Allergen Reactions    Azithromycin Unknown - High Severity    Codeine Unknown - High Severity    Penicillins Unknown - High Severity     Past Medical History:   Diagnosis Date    Acid reflux     Acid reflux disease     Anemia, unspecified     Anxiety     Arthritis     Asthma     Broken bones     Calcaneus fracture, left     Chronic bronchitis     COPD (chronic obstructive pulmonary disease)     Depression     Diverticulitis     Emphysema lung     Head injury     skull fracture     Hernia cerebri     HTN (hypertension)     Hyperlipemia     Lung disease      Past Surgical History:   Procedure Laterality Date    COLONOSCOPY  2014, 2019    ENDOSCOPY       Family History   Problem Relation Age of Onset    Other Mother         Thyroid Gland Neoplasm    Heart disease Mother     Colon cancer Mother     Diabetes Sister     Colon cancer Paternal Grandmother        Home Medications:  Prior to Admission medications    Medication Sig Start Date End Date Taking? Authorizing Provider   diclofenac (VOLTAREN) 75 MG EC tablet Take 1 tablet by mouth 2 (Two) Times a Day. 12/27/24   Dillon Mart MD   levocetirizine (XYZAL) 5 MG tablet Take 1 tablet by mouth Every Evening. 1/3/25   Dillon Mart MD   metoprolol succinate XL (TOPROL-XL) 25 MG 24 hr tablet Take 1 tablet by mouth Daily. 12/27/24    "Dillon Mart MD   omeprazole (priLOSEC) 40 MG capsule Take 1 capsule by mouth Daily. 24   Dillon Mart MD Trelegy Ellipta 200-62.5-25 MCG/ACT inhaler  2/3/25   Provider, Historical, MD   Ventolin  (90 Base) MCG/ACT inhaler Inhale 2 puffs Every 6 (Six) Hours As Needed for Wheezing or Shortness of Air. 25   Dillon Mart MD        Social History:   Social History     Tobacco Use    Smoking status: Former     Current packs/day: 0.00     Average packs/day: 1.5 packs/day for 39.0 years (58.5 ttl pk-yrs)     Types: Cigarettes     Start date:      Quit date:      Years since quittin.4     Passive exposure: Past    Smokeless tobacco: Never   Vaping Use    Vaping status: Never Used   Substance Use Topics    Alcohol use: Yes     Comment: Occassional    Drug use: Never         Review of Systems:  Review of Systems   Constitutional:  Negative for chills and fever.   HENT:  Negative for congestion, rhinorrhea and sore throat.    Eyes:  Negative for pain and visual disturbance.   Respiratory:  Positive for shortness of breath and wheezing. Negative for apnea, cough and chest tightness.    Cardiovascular:  Negative for chest pain and palpitations.   Gastrointestinal:  Negative for abdominal pain, diarrhea, nausea and vomiting.   Genitourinary:  Negative for difficulty urinating and dysuria.   Musculoskeletal:  Negative for joint swelling and myalgias.   Skin:  Negative for color change.   Neurological:  Negative for seizures and headaches.   Psychiatric/Behavioral: Negative.     All other systems reviewed and are negative.       Physical Exam:  /71 (BP Location: Right arm, Patient Position: Sitting)   Pulse 107   Temp 99 °F (37.2 °C) (Oral)   Resp (!) 30   Ht 165.1 cm (65\")   Wt 70.6 kg (155 lb 10.3 oz)   SpO2 92%   BMI 25.90 kg/m²     Physical Exam  Vitals and nursing note reviewed.   Constitutional:       General: He is not in acute distress.     Appearance: " Normal appearance. He is not toxic-appearing.   HENT:      Head: Normocephalic and atraumatic.      Jaw: There is normal jaw occlusion.   Eyes:      General: Lids are normal.      Extraocular Movements: Extraocular movements intact.      Conjunctiva/sclera: Conjunctivae normal.      Pupils: Pupils are equal, round, and reactive to light.   Cardiovascular:      Rate and Rhythm: Regular rhythm. Tachycardia present.      Pulses: Normal pulses.      Heart sounds: Normal heart sounds.   Pulmonary:      Effort: Pulmonary effort is normal. Tachypnea present. No respiratory distress.      Breath sounds: Wheezing present. No rhonchi.   Abdominal:      General: Abdomen is flat.      Palpations: Abdomen is soft.      Tenderness: There is no abdominal tenderness. There is no guarding or rebound.   Musculoskeletal:         General: Normal range of motion.      Cervical back: Normal range of motion and neck supple.      Right lower leg: No edema.      Left lower leg: No edema.   Skin:     General: Skin is warm and dry.   Neurological:      Mental Status: He is alert and oriented to person, place, and time. Mental status is at baseline.   Psychiatric:         Mood and Affect: Mood normal.                    Medical Decision Making:      Comorbidities that affect care:    COPD    External Notes reviewed:    Previous Clinic Note: Urgent care visit for COVID-19 management      The following orders were placed and all results were independently analyzed by me:  Orders Placed This Encounter   Procedures    Blood Culture - Blood,    Blood Culture - Blood,    XR Chest 1 View    [place] Draw    Comprehensive Metabolic Panel    BNP    High Sensitivity Troponin T    Lactic Acid, Plasma    CBC Auto Differential    High Sensitivity Troponin T 1Hr    STAT Lactic Acid, Reflex    NPO Diet NPO Type: Strict NPO    Undress & Gown    Continuous Pulse Oximetry    Vital Signs    Oxygen Therapy- Nasal Cannula; Titrate 1-6 LPM Per SpO2; 90 - 95%    ECG  12 Lead ED Triage Standing Order; SOA    Insert Peripheral IV    CBC & Differential    Green Top (Gel)    Lavender Top    Gold Top - SST    Light Blue Top       Medications Given in the Emergency Department:  Medications   sodium chloride 0.9 % flush 10 mL (has no administration in time range)   ipratropium-albuterol (DUO-NEB) nebulizer solution 3 mL (3 mL Nebulization Given 5/24/25 1346)   methylPREDNISolone sodium succinate (SOLU-Medrol) 125 mg in sterile water (preservative free) 2 mL (125 mg Intravenous Given 5/24/25 1345)        ED Course:         Labs:    Lab Results (last 24 hours)       Procedure Component Value Units Date/Time    CBC & Differential [[id number]]  (Abnormal) Collected: 05/24/25 1300    Specimen: Blood Updated: 05/24/25 1310    Narrative:      The following orders were created for panel order CBC & Differential.  Procedure                               Abnormality         Status                     ---------                               -----------         ------                     CBC Auto Differential[[id number]]        Abnormal            Final result                 Please view results for these tests on the individual orders.    Comprehensive Metabolic Panel [[id number]]  (Abnormal) Collected: 05/24/25 1300    Specimen: Blood Updated: 05/24/25 1332     Glucose 106 mg/dL      BUN 13 mg/dL      Creatinine 1.53 mg/dL      Sodium 140 mmol/L      Potassium 3.8 mmol/L      Chloride 108 mmol/L      CO2 17.9 mmol/L      Calcium 8.4 mg/dL      Total Protein 7.2 g/dL      Albumin 4.2 g/dL      ALT (SGPT) 19 U/L      AST (SGOT) 29 U/L      Alkaline Phosphatase 138 U/L      Total Bilirubin 0.5 mg/dL      Globulin 3.0 gm/dL      A/G Ratio 1.4 g/dL      BUN/Creatinine Ratio 8.5     Anion Gap 14.1 mmol/L      eGFR 49.5 mL/min/1.73     Narrative:      GFR Categories in Chronic Kidney Disease (CKD)              GFR Category          GFR (mL/min/1.73)    Interpretation  G1                    90 or greater         Normal or high (1)  G2                    60-89                Mild decrease (1)  G3a                   45-59                Mild to moderate decrease  G3b                   30-44                Moderate to severe decrease  G4                    15-29                Severe decrease  G5                    14 or less           Kidney failure    (1)In the absence of evidence of kidney disease, neither GFR category G1 or G2 fulfill the criteria for CKD.    eGFR calculation 2021 CKD-EPI creatinine equation, which does not include race as a factor    BNP [[id number]]  (Normal) Collected: 05/24/25 1300    Specimen: Blood Updated: 05/24/25 1329     proBNP 468.1 pg/mL     Narrative:      This assay is used as an aid in the diagnosis of individuals suspected of having heart failure. It can be used as an aid in the diagnosis of acute decompensated heart failure (ADHF) in patients presenting with signs and symptoms of ADHF to the emergency department (ED). In addition, NT-proBNP of <300 pg/mL indicates ADHF is not likely.    Age Range Result Interpretation  NT-proBNP Concentration (pg/mL:      <50             Positive            >450                   Gray                 300-450                    Negative             <300    50-75           Positive            >900                  Gray                300-900                  Negative            <300      >75             Positive            >1800                  Gray                300-1800                  Negative            <300    High Sensitivity Troponin T [[id number]]  (Normal) Collected: 05/24/25 1300    Specimen: Blood Updated: 05/24/25 1332     HS Troponin T 17 ng/L     Narrative:      High Sensitive Troponin T Reference Range:  <14.0 ng/L- Negative Female for AMI  <22.0 ng/L- Negative Male for AMI  >=14 - Abnormal Female indicating possible myocardial injury.  >=22 - Abnormal Male indicating possible myocardial injury.   Clinicians would have to utilize  clinical acumen, EKG, Troponin, and serial changes to determine if it is an Acute Myocardial Infarction or myocardial injury due to an underlying chronic condition.         Lactic Acid, Plasma [[id number]]  (Abnormal) Collected: 05/24/25 1300    Specimen: Blood Updated: 05/24/25 1350     Lactate 2.6 mmol/L     CBC Auto Differential [[id number]]  (Abnormal) Collected: 05/24/25 1300    Specimen: Blood Updated: 05/24/25 1310     WBC 8.90 10*3/mm3      RBC 4.54 10*6/mm3      Hemoglobin 14.2 g/dL      Hematocrit 43.0 %      MCV 94.7 fL      MCH 31.3 pg      MCHC 33.0 g/dL      RDW 12.0 %      RDW-SD 41.7 fl      MPV 11.2 fL      Platelets 164 10*3/mm3      Neutrophil % 71.0 %      Lymphocyte % 14.0 %      Monocyte % 12.7 %      Eosinophil % 1.5 %      Basophil % 0.6 %      Immature Grans % 0.2 %      Neutrophils, Absolute 6.32 10*3/mm3      Lymphocytes, Absolute 1.25 10*3/mm3      Monocytes, Absolute 1.13 10*3/mm3      Eosinophils, Absolute 0.13 10*3/mm3      Basophils, Absolute 0.05 10*3/mm3      Immature Grans, Absolute 0.02 10*3/mm3      nRBC 0.0 /100 WBC     Blood Culture - Blood, Arm, Right [[id number]] Collected: 05/24/25 1319    Specimen: Blood from Arm, Right Updated: 05/24/25 1330    Blood Culture - Blood, Arm, Right [[id number]] Collected: 05/24/25 1319    Specimen: Blood from Arm, Right Updated: 05/24/25 1330    High Sensitivity Troponin T 1Hr [[id number]]  (Normal) Collected: 05/24/25 1449    Specimen: Blood Updated: 05/24/25 1517     HS Troponin T 14 ng/L      Troponin T Numeric Delta -3 ng/L     Narrative:      High Sensitive Troponin T Reference Range:  <14.0 ng/L- Negative Female for AMI  <22.0 ng/L- Negative Male for AMI  >=14 - Abnormal Female indicating possible myocardial injury.  >=22 - Abnormal Male indicating possible myocardial injury.   Clinicians would have to utilize clinical acumen, EKG, Troponin, and serial changes to determine if it is an Acute Myocardial Infarction or myocardial injury due to an  underlying chronic condition.                  Imaging:    XR Chest 1 View  Result Date: 5/24/2025  XR CHEST 1 VW Date of Exam: 5/24/2025 1:27 PM EDT Indication: SOA Triage Protocol Comparison: CT chest Shaunry 26 2025 Findings: Heart not enlarged. Lungs seem clear. There are no pleural effusions.     Impression: An acute pulmonary process is not apparent. Electronically Signed: Marco Winn MD  5/24/2025 1:35 PM EDT  Workstation ID: [id number]        Differential Diagnosis and Discussion:    Dyspnea: Differential diagnosis includes but is not limited to metabolic acidosis, neurological disorders, psychogenic, asthma, pneumothorax, upper airway obstruction, COPD, pneumonia, noncardiogenic pulmonary edema, interstitial lung disease, anemia, congestive heart failure, and pulmonary embolism    PROCEDURES:    Labs were collected in the emergency department and all labs were reviewed and interpreted by me.  X-ray were performed in the emergency department and all X-ray impressions were independently interpreted by me.  An EKG was performed and the EKG was interpreted by me.    ECG 12 Lead ED Triage Standing Order; SOA   Preliminary Result   HEART RATE=98  bpm   RR Hyyemtdq=225  ms   PA Ofqxbvzr=005  ms   P Horizontal Axis=-64  deg   P Front Axis=52  deg   QRSD Interval=64  ms   QT Outttizy=818  ms   VUiF=731  ms   QRS Axis=58  deg   T Wave Axis=46  deg   - NORMAL ECG -   Sinus rhythm   Date and Time of Study:2025-05-24 13:16:43        My interpretation of the EKG shows sinus rhythm, normal rate, normal QT, no acute ischemia    Procedures    MDM  Number of Diagnoses or Management Options  Acute exacerbation of chronic obstructive pulmonary disease (COPD)  Diagnosis management comments: In summary this is a 69-year-old male patient with history of COPD who presents to the emergency department for evaluation of shortness of breath and wheezing.  After breathing treatment and steroids in the Emergency Department respiratory  status is much improved and he reports feeling much better.  Wheezing is significantly improved.  CBC independently reviewed and interpreted by me and shows no critical abnormalities.  CMP independently reviewed and interpreted by me and shows no critical abnormalities.  Chest x-ray reviewed by me is unremarkable negative for acute pathology.  Patient will be discharged home with prescriptions for steroids, antibiotics and muscle relaxers (patient reports muscle spasms due to previous injuries).  Very strict return to ER and follow-up instructions have been provided to the patient.                         Patient Care Considerations:    CT CHEST: I considered ordering a CT scan of the chest, however chest x-ray unremarkable      Consultants/Shared Management Plan:    None    Social Determinants of Health:    Patient is independent, reliable, and has access to care.       Disposition and Care Coordination:    Discharged: I considered escalation of care by admitting this patient to the hospital, however no respiratory distress or failure    I have explained the patient´s condition, diagnoses and treatment plan based on the information available to me at this time. I have answered questions and addressed any concerns. The patient has a good  understanding of the patient´s diagnosis, condition, and treatment plan as can be expected at this point. The vital signs have been stable. The patient´s condition is stable and appropriate for discharge from the emergency department.      The patient will pursue further outpatient evaluation with the primary care physician or other designated or consulting physician as outlined in the discharge instructions. They are agreeable to this plan of care and follow-up instructions have been explained in detail. The patient has received these instructions in written format and has expressed an understanding of the discharge instructions. The patient is aware that any significant change in  condition or worsening of symptoms should prompt an immediate return to this or the closest emergency department or call to 911.  I have explained discharge medications and the need for follow up with the patient/caretakers. This was also printed in the discharge instructions. Patient was discharged with the following medications and follow up:      Medication List        New Prescriptions      doxycycline 100 MG capsule  Commonly known as: MONODOX  Take 1 capsule by mouth 2 (Two) Times a Day for 7 days.     methocarbamol 750 MG tablet  Commonly known as: ROBAXIN  Take 2 tablets by mouth 3 (Three) Times a Day As Needed for Muscle Spasms.     predniSONE 20 MG tablet  Commonly known as: DELTASONE  Take 3 tablets by mouth Daily.               Where to Get Your Medications        These medications were sent to [facility] - [place], [place] - [address] [facility] - [phone] [facility] [phone] [address] [address]      Phone: [phone]   doxycycline 100 MG capsule  methocarbamol 750 MG tablet  predniSONE 20 MG tablet      Rule Hung MD  [address] [id number] [phone]    In 1 week         Final diagnoses:   Acute exacerbation of chronic obstructive pulmonary disease (COPD)   Muscle spasm        ED Disposition       ED Disposition   Discharge    Condition   Stable    Comment   --               This medical record created using voice recognition software.             Fabian Amador MD  05/24/25 [id number]

## 2025-05-29 LAB
BACTERIA SPEC AEROBE CULT: NORMAL
BACTERIA SPEC AEROBE CULT: NORMAL

## 2025-06-11 LAB
QT INTERVAL: 332 MS
QTC INTERVAL: 423 MS